# Patient Record
Sex: FEMALE | Race: WHITE | Employment: PART TIME | URBAN - METROPOLITAN AREA
[De-identification: names, ages, dates, MRNs, and addresses within clinical notes are randomized per-mention and may not be internally consistent; named-entity substitution may affect disease eponyms.]

---

## 2017-01-30 ENCOUNTER — OFFICE VISIT (OUTPATIENT)
Dept: INTERNAL MEDICINE CLINIC | Age: 67
End: 2017-01-30

## 2017-01-30 VITALS
HEIGHT: 67 IN | OXYGEN SATURATION: 97 % | SYSTOLIC BLOOD PRESSURE: 132 MMHG | HEART RATE: 51 BPM | WEIGHT: 188 LBS | DIASTOLIC BLOOD PRESSURE: 80 MMHG | BODY MASS INDEX: 29.51 KG/M2

## 2017-01-30 DIAGNOSIS — R35.0 FREQUENT URINATION: Primary | ICD-10-CM

## 2017-01-30 LAB
BILIRUBIN, POC: NORMAL
BLOOD URINE, POC: NORMAL
CLARITY, POC: NORMAL
COLOR, POC: NORMAL
GLUCOSE URINE, POC: NORMAL
KETONES, POC: NORMAL
LEUKOCYTE EST, POC: NORMAL
NITRITE, POC: NORMAL
PH, POC: 6
PROTEIN, POC: NORMAL
SPECIFIC GRAVITY, POC: 1.01
UROBILINOGEN, POC: 0.2

## 2017-01-30 PROCEDURE — 81002 URINALYSIS NONAUTO W/O SCOPE: CPT | Performed by: INTERNAL MEDICINE

## 2017-01-30 PROCEDURE — 99213 OFFICE O/P EST LOW 20 MIN: CPT | Performed by: INTERNAL MEDICINE

## 2017-01-30 RX ORDER — SULFAMETHOXAZOLE AND TRIMETHOPRIM 800; 160 MG/1; MG/1
1 TABLET ORAL 2 TIMES DAILY
Qty: 10 TABLET | Refills: 0 | Status: SHIPPED | OUTPATIENT
Start: 2017-01-30 | End: 2017-02-04

## 2017-01-30 ASSESSMENT — ENCOUNTER SYMPTOMS: COUGH: 0

## 2017-02-02 LAB
ORGANISM: ABNORMAL
URINE CULTURE, ROUTINE: ABNORMAL

## 2017-02-16 ENCOUNTER — OFFICE VISIT (OUTPATIENT)
Dept: INTERNAL MEDICINE CLINIC | Age: 67
End: 2017-02-16

## 2017-02-16 VITALS
OXYGEN SATURATION: 96 % | DIASTOLIC BLOOD PRESSURE: 60 MMHG | HEIGHT: 67 IN | SYSTOLIC BLOOD PRESSURE: 132 MMHG | WEIGHT: 189.6 LBS | TEMPERATURE: 98 F | HEART RATE: 49 BPM | BODY MASS INDEX: 29.76 KG/M2

## 2017-02-16 DIAGNOSIS — R21 RASH: ICD-10-CM

## 2017-02-16 DIAGNOSIS — I10 ESSENTIAL HYPERTENSION: ICD-10-CM

## 2017-02-16 DIAGNOSIS — K21.9 GASTROESOPHAGEAL REFLUX DISEASE WITHOUT ESOPHAGITIS: ICD-10-CM

## 2017-02-16 DIAGNOSIS — E78.00 PURE HYPERCHOLESTEROLEMIA: ICD-10-CM

## 2017-02-16 DIAGNOSIS — Z23 NEED FOR INFLUENZA VACCINATION: Primary | ICD-10-CM

## 2017-02-16 PROCEDURE — 90662 IIV NO PRSV INCREASED AG IM: CPT | Performed by: INTERNAL MEDICINE

## 2017-02-16 PROCEDURE — 99214 OFFICE O/P EST MOD 30 MIN: CPT | Performed by: INTERNAL MEDICINE

## 2017-02-16 PROCEDURE — G0008 ADMIN INFLUENZA VIRUS VAC: HCPCS | Performed by: INTERNAL MEDICINE

## 2017-02-16 RX ORDER — CLOTRIMAZOLE AND BETAMETHASONE DIPROPIONATE 10; .64 MG/G; MG/G
CREAM TOPICAL
Qty: 45 G | Refills: 1 | Status: SHIPPED | OUTPATIENT
Start: 2017-02-16 | End: 2017-08-17

## 2017-02-16 RX ORDER — LISINOPRIL AND HYDROCHLOROTHIAZIDE 20; 12.5 MG/1; MG/1
TABLET ORAL
Qty: 90 TABLET | Refills: 1 | Status: SHIPPED | OUTPATIENT
Start: 2017-02-16 | End: 2017-08-17 | Stop reason: SDUPTHER

## 2017-02-16 RX ORDER — FAMOTIDINE 20 MG/1
20 TABLET, FILM COATED ORAL DAILY
Qty: 90 TABLET | Refills: 1 | Status: SHIPPED | OUTPATIENT
Start: 2017-02-16 | End: 2017-08-17 | Stop reason: SDUPTHER

## 2017-02-16 RX ORDER — LOVASTATIN 40 MG/1
40 TABLET ORAL DAILY
Qty: 90 TABLET | Refills: 1 | Status: SHIPPED | OUTPATIENT
Start: 2017-02-16 | End: 2017-08-17 | Stop reason: SDUPTHER

## 2017-02-16 RX ORDER — ATENOLOL 50 MG/1
TABLET ORAL
Qty: 90 TABLET | Refills: 1 | Status: SHIPPED | OUTPATIENT
Start: 2017-02-16 | End: 2017-08-17 | Stop reason: SDUPTHER

## 2017-02-16 ASSESSMENT — ENCOUNTER SYMPTOMS
SORE THROAT: 0
COUGH: 0

## 2017-07-07 DIAGNOSIS — I10 ESSENTIAL HYPERTENSION: ICD-10-CM

## 2017-07-07 DIAGNOSIS — E78.00 PURE HYPERCHOLESTEROLEMIA: ICD-10-CM

## 2017-07-07 LAB
A/G RATIO: 1.9 (ref 1.1–2.2)
ALBUMIN SERPL-MCNC: 4.2 G/DL (ref 3.4–5)
ALP BLD-CCNC: 63 U/L (ref 40–129)
ALT SERPL-CCNC: 15 U/L (ref 10–40)
ANION GAP SERPL CALCULATED.3IONS-SCNC: 13 MMOL/L (ref 3–16)
AST SERPL-CCNC: 13 U/L (ref 15–37)
BILIRUB SERPL-MCNC: 0.5 MG/DL (ref 0–1)
BUN BLDV-MCNC: 16 MG/DL (ref 7–20)
CALCIUM SERPL-MCNC: 9.4 MG/DL (ref 8.3–10.6)
CHLORIDE BLD-SCNC: 103 MMOL/L (ref 99–110)
CHOLESTEROL, TOTAL: 174 MG/DL (ref 0–199)
CO2: 27 MMOL/L (ref 21–32)
CREAT SERPL-MCNC: 0.7 MG/DL (ref 0.6–1.2)
GFR AFRICAN AMERICAN: >60
GFR NON-AFRICAN AMERICAN: >60
GLOBULIN: 2.2 G/DL
GLUCOSE BLD-MCNC: 101 MG/DL (ref 70–99)
HDLC SERPL-MCNC: 43 MG/DL (ref 40–60)
LDL CHOLESTEROL CALCULATED: 92 MG/DL
POTASSIUM SERPL-SCNC: 4.9 MMOL/L (ref 3.5–5.1)
SODIUM BLD-SCNC: 143 MMOL/L (ref 136–145)
TOTAL PROTEIN: 6.4 G/DL (ref 6.4–8.2)
TRIGL SERPL-MCNC: 197 MG/DL (ref 0–150)
VLDLC SERPL CALC-MCNC: 39 MG/DL

## 2017-07-20 ENCOUNTER — TELEPHONE (OUTPATIENT)
Dept: FAMILY MEDICINE CLINIC | Age: 67
End: 2017-07-20

## 2017-08-17 ENCOUNTER — OFFICE VISIT (OUTPATIENT)
Dept: FAMILY MEDICINE CLINIC | Age: 67
End: 2017-08-17

## 2017-08-17 VITALS
WEIGHT: 188.2 LBS | SYSTOLIC BLOOD PRESSURE: 112 MMHG | DIASTOLIC BLOOD PRESSURE: 70 MMHG | BODY MASS INDEX: 29.54 KG/M2 | HEIGHT: 67 IN | HEART RATE: 57 BPM | OXYGEN SATURATION: 98 %

## 2017-08-17 DIAGNOSIS — K21.9 GASTROESOPHAGEAL REFLUX DISEASE WITHOUT ESOPHAGITIS: ICD-10-CM

## 2017-08-17 DIAGNOSIS — Z23 NEED FOR VACCINATION WITH 13-POLYVALENT PNEUMOCOCCAL CONJUGATE VACCINE: ICD-10-CM

## 2017-08-17 DIAGNOSIS — I10 ESSENTIAL HYPERTENSION: Primary | ICD-10-CM

## 2017-08-17 DIAGNOSIS — E78.00 PURE HYPERCHOLESTEROLEMIA: ICD-10-CM

## 2017-08-17 DIAGNOSIS — Z12.31 ENCOUNTER FOR SCREENING MAMMOGRAM FOR BREAST CANCER: ICD-10-CM

## 2017-08-17 PROCEDURE — 90670 PCV13 VACCINE IM: CPT | Performed by: INTERNAL MEDICINE

## 2017-08-17 PROCEDURE — G0009 ADMIN PNEUMOCOCCAL VACCINE: HCPCS | Performed by: INTERNAL MEDICINE

## 2017-08-17 PROCEDURE — 99214 OFFICE O/P EST MOD 30 MIN: CPT | Performed by: INTERNAL MEDICINE

## 2017-08-17 RX ORDER — LISINOPRIL AND HYDROCHLOROTHIAZIDE 20; 12.5 MG/1; MG/1
TABLET ORAL
Qty: 90 TABLET | Refills: 1 | Status: SHIPPED | OUTPATIENT
Start: 2017-08-17 | End: 2018-02-13 | Stop reason: SDUPTHER

## 2017-08-17 RX ORDER — FAMOTIDINE 20 MG/1
20 TABLET, FILM COATED ORAL DAILY
Qty: 90 TABLET | Refills: 1 | Status: SHIPPED | OUTPATIENT
Start: 2017-08-17 | End: 2018-02-28 | Stop reason: SDUPTHER

## 2017-08-17 RX ORDER — ATENOLOL 50 MG/1
TABLET ORAL
Qty: 90 TABLET | Refills: 1 | Status: SHIPPED | OUTPATIENT
Start: 2017-08-17 | End: 2018-02-13 | Stop reason: SDUPTHER

## 2017-08-17 RX ORDER — LOVASTATIN 40 MG/1
40 TABLET ORAL DAILY
Qty: 90 TABLET | Refills: 1 | Status: SHIPPED | OUTPATIENT
Start: 2017-08-17 | End: 2018-08-07 | Stop reason: SDUPTHER

## 2017-08-20 ASSESSMENT — ENCOUNTER SYMPTOMS
SORE THROAT: 0
COUGH: 0

## 2017-11-27 ASSESSMENT — ENCOUNTER SYMPTOMS
SORE THROAT: 0
COUGH: 0

## 2017-11-28 ENCOUNTER — OFFICE VISIT (OUTPATIENT)
Dept: FAMILY MEDICINE CLINIC | Age: 67
End: 2017-11-28

## 2017-11-28 VITALS
HEART RATE: 53 BPM | SYSTOLIC BLOOD PRESSURE: 134 MMHG | BODY MASS INDEX: 29.51 KG/M2 | RESPIRATION RATE: 16 BRPM | OXYGEN SATURATION: 98 % | DIASTOLIC BLOOD PRESSURE: 78 MMHG | WEIGHT: 188 LBS | HEIGHT: 67 IN | TEMPERATURE: 98 F

## 2017-11-28 DIAGNOSIS — E78.00 PURE HYPERCHOLESTEROLEMIA: ICD-10-CM

## 2017-11-28 DIAGNOSIS — I10 ESSENTIAL HYPERTENSION: Primary | ICD-10-CM

## 2017-11-28 DIAGNOSIS — R73.01 IMPAIRED FASTING BLOOD SUGAR: ICD-10-CM

## 2017-11-28 DIAGNOSIS — K21.9 GASTROESOPHAGEAL REFLUX DISEASE WITHOUT ESOPHAGITIS: ICD-10-CM

## 2017-11-28 DIAGNOSIS — Z23 NEED FOR INFLUENZA VACCINATION: ICD-10-CM

## 2017-11-28 PROCEDURE — G0008 ADMIN INFLUENZA VIRUS VAC: HCPCS | Performed by: INTERNAL MEDICINE

## 2017-11-28 PROCEDURE — 99214 OFFICE O/P EST MOD 30 MIN: CPT | Performed by: INTERNAL MEDICINE

## 2017-11-28 PROCEDURE — 90662 IIV NO PRSV INCREASED AG IM: CPT | Performed by: INTERNAL MEDICINE

## 2017-11-28 ASSESSMENT — PATIENT HEALTH QUESTIONNAIRE - PHQ9
1. LITTLE INTEREST OR PLEASURE IN DOING THINGS: 0
2. FEELING DOWN, DEPRESSED OR HOPELESS: 0
SUM OF ALL RESPONSES TO PHQ9 QUESTIONS 1 & 2: 0
SUM OF ALL RESPONSES TO PHQ QUESTIONS 1-9: 0

## 2017-11-28 NOTE — PROGRESS NOTES
Subjective:      Patient ID: Abigail Etsrada is a 69 y.o. female. Came in for check up. On pepcid for gerd. Use lotrisone cream for inguinal rash. .         Hyperlipidemia   This is a chronic problem. Episode onset: since 2007. The problem is controlled. Recent lipid tests were reviewed and are normal. There are no known factors aggravating her hyperlipidemia. Pertinent negatives include no chest pain. Treatments tried: mevacor 20 mg daily. The current treatment provides significant improvement of lipids. There are no compliance problems. Risk factors for coronary artery disease include dyslipidemia, hypertension, post-menopausal and stress. Hypertension   This is a chronic problem. Episode onset: since 2007. The problem has been gradually improving since onset. The problem is controlled. Pertinent negatives include no chest pain, headaches, neck pain or peripheral edema. There are no associated agents to hypertension. Risk factors for coronary artery disease include dyslipidemia, family history, post-menopausal state and sedentary lifestyle. Past treatments include ACE inhibitors, diuretics and beta blockers. The current treatment provides significant improvement. There are no compliance problems. no end organ damage. no identifiable causes.      Lab Results   Component Value Date    CHOL 174 07/07/2017    CHOL 179 08/24/2016    CHOL 173 02/02/2016     Lab Results   Component Value Date    TRIG 197 (H) 07/07/2017    TRIG 171 (H) 08/24/2016    TRIG 162 (H) 02/02/2016     Lab Results   Component Value Date    HDL 43 07/07/2017    HDL 40 08/24/2016    HDL 41 02/02/2016     Lab Results   Component Value Date    LDLCALC 92 07/07/2017    LDLCALC 105 (H) 08/24/2016    LDLCALC 100 (H) 02/02/2016     Lab Results   Component Value Date    LABVLDL 39 07/07/2017    LABVLDL 34 08/24/2016    LABVLDL 32 02/02/2016       Chemistry        Component Value Date/Time     07/07/2017 1027    K 4.9 07/07/2017 1027  07/07/2017 1027    CO2 27 07/07/2017 1027    BUN 16 07/07/2017 1027    CREATININE 0.7 07/07/2017 1027        Component Value Date/Time    CALCIUM 9.4 07/07/2017 1027    ALKPHOS 63 07/07/2017 1027    AST 13 (L) 07/07/2017 1027    ALT 15 07/07/2017 1027    BILITOT 0.5 07/07/2017 1027       Blood sugar     101                               O7/07/2017    Review of Systems   Constitutional: Negative for activity change. HENT: Negative. Negative for sore throat. Respiratory: Negative for cough. Cardiovascular: Negative for chest pain. Gastrointestinal:        Taking pepcid OTC for gerd   Musculoskeletal: Negative for neck pain. Neurological: Negative for dizziness and headaches. Hematological: Does not bruise/bleed easily. Psychiatric/Behavioral: Negative. Patient Active Problem List   Diagnosis    Hypertension    Hyperlipidemia    Impaired fasting blood sugar       No outpatient prescriptions have been marked as taking for the 11/28/17 encounter (Appointment) with Madison Wisdom MD.       No Known Allergies    Social History   Substance Use Topics    Smoking status: Never Smoker    Smokeless tobacco: Not on file    Alcohol use No       Objective: There were no vitals taken for this visit. Physical Exam   Constitutional: She appears well-developed and well-nourished. HENT:   Head: Normocephalic. Eyes: Pupils are equal, round, and reactive to light. Neck: Normal range of motion. Neck supple. Cardiovascular: Normal rate and regular rhythm. Pulmonary/Chest: Effort normal and breath sounds normal.   Abdominal: Soft. Musculoskeletal: Normal range of motion. She exhibits no edema. Psychiatric: She has a normal mood and affect. Nursing note and vitals reviewed. Assessment:/plan:      1. Need for influenza vaccination    - INFLUENZA, HIGH DOSE, 65 YRS +, IM, PF, PREFILL SYR, 0.5ML (FLUZONE HD)    2. Essential hypertension  -controlled    3.  Pure hypercholesterolemia  -controlled    4. Impaired fasting blood sugar    -cutting back sweets and carbs    5. Josh Ram  [employer] received counseling on the following healthy behaviors: nutrition, exercise and medication adherence    Patient given educational materials on Hypertension    I have instructed Tanesha Mustafa to complete a self tracking handout on Blood Pressures  and instructed them to bring it with them to her next appointment. Discussed use, benefit, and side effects of prescribed medications. Barriers to medication compliance addressed. All patient questions answered. Pt voiced understanding.          Brooklyn Agudelo MD

## 2017-11-28 NOTE — PROGRESS NOTES
Vaccine Information Sheet, \"Influenza - Inactivated\"  given to Ary Urban, or parent/legal guardian of  Ary Urban and verbalized understanding. Patient responses:    Have you ever had a reaction to a flu vaccine? No  Are you able to eat eggs without adverse effects? Yes  Do you have any current illness? No  Have you ever had [place] Syndrome? No    Flu vaccine given per order. Please see immunization tab.

## 2017-11-28 NOTE — PATIENT INSTRUCTIONS
take decongestants or anti-inflammatory medicine, such as ibuprofen. Some of these medicines can raise blood pressure. · Learn how to check your blood pressure at home. Lifestyle changes  · Stay at a healthy weight. This is especially important if you put on weight around the waist. Losing even 10 pounds can help you lower your blood pressure. · If your doctor recommends it, get more exercise. Walking is a good choice. Bit by bit, increase the amount you walk every day. Try for at least 30 minutes on most days of the week. You also may want to swim, bike, or do other activities. · Avoid or limit alcohol. Talk to your doctor about whether you can drink any alcohol. · Try to limit how much sodium you eat to less than 2,300 milligrams (mg) a day. Your doctor may ask you to try to eat less than 1,500 mg a day. · Eat plenty of fruits (such as bananas and oranges), vegetables, legumes, whole grains, and low-fat dairy products. · Lower the amount of saturated fat in your diet. Saturated fat is found in animal products such as milk, cheese, and meat. Limiting these foods may help you lose weight and also lower your risk for heart disease. · Do not smoke. Smoking increases your risk for heart attack and stroke. If you need help quitting, talk to your doctor about stop-smoking programs and medicines. These can increase your chances of quitting for good. When should you call for help? Call 911 anytime you think you may need emergency care. This may mean having symptoms that suggest that your blood pressure is causing a serious heart or blood vessel problem. Your blood pressure may be over 180/110. For example, call 911 if:  · You have symptoms of a heart attack. These may include:  ¨ Chest pain or pressure, or a strange feeling in the chest.  ¨ Sweating. ¨ Shortness of breath. ¨ Nausea or vomiting.   ¨ Pain, pressure, or a strange feeling in the back, neck, jaw, or upper belly or in one or both shoulders or

## 2017-12-03 PROBLEM — K21.9 GASTROESOPHAGEAL REFLUX DISEASE WITHOUT ESOPHAGITIS: Status: ACTIVE | Noted: 2017-12-03

## 2018-01-25 ENCOUNTER — TELEPHONE (OUTPATIENT)
Dept: FAMILY MEDICINE CLINIC | Age: 68
End: 2018-01-25

## 2018-01-25 DIAGNOSIS — Z12.11 SCREEN FOR COLON CANCER: Primary | ICD-10-CM

## 2018-01-25 LAB
CONTROL: ABNORMAL
HEMOCCULT STL QL: ABNORMAL

## 2018-01-25 PROCEDURE — 82274 ASSAY TEST FOR BLOOD FECAL: CPT | Performed by: INTERNAL MEDICINE

## 2018-02-01 ENCOUNTER — OFFICE VISIT (OUTPATIENT)
Dept: FAMILY MEDICINE CLINIC | Age: 68
End: 2018-02-01

## 2018-02-01 VITALS
SYSTOLIC BLOOD PRESSURE: 118 MMHG | OXYGEN SATURATION: 98 % | HEART RATE: 65 BPM | DIASTOLIC BLOOD PRESSURE: 70 MMHG | WEIGHT: 187 LBS | HEIGHT: 67 IN | BODY MASS INDEX: 29.35 KG/M2

## 2018-02-01 DIAGNOSIS — R19.5 POSITIVE FIT (FECAL IMMUNOCHEMICAL TEST): ICD-10-CM

## 2018-02-01 DIAGNOSIS — J01.00 ACUTE MAXILLARY SINUSITIS, RECURRENCE NOT SPECIFIED: Primary | ICD-10-CM

## 2018-02-01 PROCEDURE — 99213 OFFICE O/P EST LOW 20 MIN: CPT | Performed by: INTERNAL MEDICINE

## 2018-02-01 RX ORDER — AMOXICILLIN AND CLAVULANATE POTASSIUM 875; 125 MG/1; MG/1
1 TABLET, FILM COATED ORAL 2 TIMES DAILY WITH MEALS
Qty: 20 TABLET | Refills: 0 | Status: SHIPPED | OUTPATIENT
Start: 2018-02-01 | End: 2018-02-11

## 2018-02-01 ASSESSMENT — ENCOUNTER SYMPTOMS
COUGH: 1
SINUS PRESSURE: 1

## 2018-02-01 NOTE — PATIENT INSTRUCTIONS
your test results and keep a list of the medicines you take. How can you care for yourself at home? · Take an over-the-counter pain medicine, such as acetaminophen (Tylenol), ibuprofen (Advil, Motrin), or naproxen (Aleve). Read and follow all instructions on the label. · If the doctor prescribed antibiotics, take them as directed. Do not stop taking them just because you feel better. You need to take the full course of antibiotics. · Be careful when taking over-the-counter cold or flu medicines and Tylenol at the same time. Many of these medicines have acetaminophen, which is Tylenol. Read the labels to make sure that you are not taking more than the recommended dose. Too much acetaminophen (Tylenol) can be harmful. · Breathe warm, moist air from a steamy shower, a hot bath, or a sink filled with hot water. Avoid cold, dry air. Using a humidifier in your home may help. Follow the directions for cleaning the machine. · Use saline (saltwater) nasal washes to help keep your nasal passages open and wash out mucus and bacteria. You can buy saline nose drops at a grocery store or drugstore. Or you can make your own at home by adding 1 teaspoon of salt and 1 teaspoon of baking soda to 2 cups of distilled water. If you make your own, fill a bulb syringe with the solution, insert the tip into your nostril, and squeeze gently. [employer] your nose. · Put a hot, wet towel or a warm gel pack on your face 3 or 4 times a day for 5 to 10 minutes each time. · Try a decongestant nasal spray like oxymetazoline (Afrin). Do not use it for more than 3 days in a row. Using it for more than 3 days can make your congestion worse. When should you call for help? Call your doctor now or seek immediate medical care if:  ? · You have new or worse swelling or redness in your face or around your eyes. ? · You have a new or higher fever. ? Watch closely for changes in your health, and be sure to contact your doctor if:  ? · You have new or which is Tylenol. Read the labels to make sure that you are not taking more than the recommended dose. Too much acetaminophen (Tylenol) can be harmful. · Breathe warm, moist air from a steamy shower, a hot bath, or a sink filled with hot water. Avoid cold, dry air. Using a humidifier in your home may help. Follow the directions for cleaning the machine. · Use saline (saltwater) nasal washes to help keep your nasal passages open and wash out mucus and bacteria. You can buy saline nose drops at a grocery store or [employer]. Or you can make your own at home by adding 1 teaspoon of salt and 1 teaspoon of baking soda to 2 cups of distilled water. If you make your own, fill a bulb syringe with the solution, insert the tip into your nostril, and squeeze gently. [employer] your nose. · Put a hot, wet towel or a warm gel pack on your face 3 or 4 times a day for 5 to 10 minutes each time. · Try a decongestant nasal spray like oxymetazoline (Afrin). Do not use it for more than 3 days in a row. Using it for more than 3 days can make your congestion worse. When should you call for help? Call your doctor now or seek immediate medical care if:  ? · You have new or worse swelling or redness in your face or around your eyes. ? · You have a new or higher fever. ? Watch closely for changes in your health, and be sure to contact your doctor if:  ? · You have new or worse facial pain. ? · The mucus from your nose becomes thicker (like pus) or has new blood in it. ? · You are not getting better as expected. Where can you learn more? Go to https://CatbirdpeGood World Gameseb.[employer]. org and sign in to your [employer] account. Enter G192 in the Barnebys box to learn more about \"Sinusitis: Care Instructions. \"     If you do not have an account, please click on the \"Sign Up Now\" link. Current as of: May 12, 2017  Content Version: 11.5  © 5430-8188 Healthwise, [employer].  Care instructions adapted under license by [employer]

## 2018-02-02 PROBLEM — R19.5 POSITIVE FIT (FECAL IMMUNOCHEMICAL TEST): Status: ACTIVE | Noted: 2018-02-02

## 2018-02-02 ASSESSMENT — ENCOUNTER SYMPTOMS: SINUS PAIN: 1

## 2018-02-13 DIAGNOSIS — I10 ESSENTIAL HYPERTENSION: ICD-10-CM

## 2018-02-13 DIAGNOSIS — E78.00 PURE HYPERCHOLESTEROLEMIA: ICD-10-CM

## 2018-02-14 RX ORDER — LOVASTATIN 40 MG/1
TABLET ORAL
Qty: 90 TABLET | Refills: 1 | Status: SHIPPED | OUTPATIENT
Start: 2018-02-14 | End: 2018-03-26 | Stop reason: SDUPTHER

## 2018-02-14 RX ORDER — ATENOLOL 50 MG/1
TABLET ORAL
Qty: 90 TABLET | Refills: 1 | Status: SHIPPED | OUTPATIENT
Start: 2018-02-14 | End: 2018-08-07 | Stop reason: SDUPTHER

## 2018-02-14 RX ORDER — LISINOPRIL AND HYDROCHLOROTHIAZIDE 20; 12.5 MG/1; MG/1
TABLET ORAL
Qty: 90 TABLET | Refills: 1 | Status: SHIPPED | OUTPATIENT
Start: 2018-02-14 | End: 2018-08-07 | Stop reason: SDUPTHER

## 2018-02-28 DIAGNOSIS — K21.9 GASTROESOPHAGEAL REFLUX DISEASE WITHOUT ESOPHAGITIS: ICD-10-CM

## 2018-03-01 RX ORDER — FAMOTIDINE 20 MG/1
20 TABLET, FILM COATED ORAL DAILY
Qty: 90 TABLET | Refills: 1 | Status: SHIPPED | OUTPATIENT
Start: 2018-03-01 | End: 2018-08-07 | Stop reason: SDUPTHER

## 2018-03-26 ENCOUNTER — OFFICE VISIT (OUTPATIENT)
Dept: FAMILY MEDICINE CLINIC | Age: 68
End: 2018-03-26

## 2018-03-26 VITALS
DIASTOLIC BLOOD PRESSURE: 70 MMHG | OXYGEN SATURATION: 95 % | HEIGHT: 67 IN | SYSTOLIC BLOOD PRESSURE: 118 MMHG | WEIGHT: 188 LBS | HEART RATE: 52 BPM | BODY MASS INDEX: 29.51 KG/M2

## 2018-03-26 DIAGNOSIS — E78.00 PURE HYPERCHOLESTEROLEMIA: ICD-10-CM

## 2018-03-26 DIAGNOSIS — J30.9 ALLERGIC RHINITIS, UNSPECIFIED CHRONICITY, UNSPECIFIED SEASONALITY, UNSPECIFIED TRIGGER: ICD-10-CM

## 2018-03-26 DIAGNOSIS — Z00.00 ROUTINE GENERAL MEDICAL EXAMINATION AT A HEALTH CARE FACILITY: Primary | ICD-10-CM

## 2018-03-26 DIAGNOSIS — I10 ESSENTIAL HYPERTENSION: ICD-10-CM

## 2018-03-26 DIAGNOSIS — R73.01 IMPAIRED FASTING BLOOD SUGAR: ICD-10-CM

## 2018-03-26 DIAGNOSIS — K21.9 GASTROESOPHAGEAL REFLUX DISEASE WITHOUT ESOPHAGITIS: ICD-10-CM

## 2018-03-26 DIAGNOSIS — R21 SKIN RASH: ICD-10-CM

## 2018-03-26 DIAGNOSIS — Z12.31 ENCOUNTER FOR SCREENING MAMMOGRAM FOR BREAST CANCER: ICD-10-CM

## 2018-03-26 DIAGNOSIS — Z12.11 SCREENING FOR COLON CANCER: ICD-10-CM

## 2018-03-26 PROCEDURE — G0439 PPPS, SUBSEQ VISIT: HCPCS | Performed by: INTERNAL MEDICINE

## 2018-03-26 RX ORDER — CLOTRIMAZOLE AND BETAMETHASONE DIPROPIONATE 10; .64 MG/G; MG/G
CREAM TOPICAL
Qty: 45 G | Refills: 1 | Status: SHIPPED | OUTPATIENT
Start: 2018-03-26 | End: 2018-03-27

## 2018-03-26 ASSESSMENT — LIFESTYLE VARIABLES: HOW OFTEN DO YOU HAVE A DRINK CONTAINING ALCOHOL: 0

## 2018-03-26 ASSESSMENT — PATIENT HEALTH QUESTIONNAIRE - PHQ9: SUM OF ALL RESPONSES TO PHQ QUESTIONS 1-9: 0

## 2018-03-26 ASSESSMENT — ANXIETY QUESTIONNAIRES: GAD7 TOTAL SCORE: 0

## 2018-03-26 NOTE — PATIENT INSTRUCTIONS
Learning About Durable Power of  for Health Care  What is a durable power of  for health care? A durable power of  for health care is one type of the legal forms called advance directives. It lets you decide who you want to make treatment decisions for you if you cannot speak or decide for yourself. The person you choose is called your health care agent. Another type of advance directive is a living will. It lets you write down what kinds of treatment or life support you want or do not want. What should you think about when choosing a health care agent? Choose your health care agent carefully. This person may or may not be a family member. Talk to the person before you make your final decision. Make sure he or she is comfortable with this responsibility. It's a good idea to choose someone who:  · Is at least 25years old. · Knows you well and understands what makes life meaningful for you. · Understands your Mu-ism and moral values. · Will do what you want, not what he or she wants. · Will be able to make difficult choices at a stressful time. · Will be able to refuse or stop treatment, if that is what you would want, even if you could die. · Will be firm and confident with health professionals if needed. · Will ask questions to get necessary information. · Lives near you or agrees to travel to you if needed. Your family may help you make medical decisions while you can still be part of that process. But it is important to choose one person to be your health care agent in case you are not able to make decisions for yourself. If you don't fill out the legal form and name a health care agent, the decisions your family can make may be limited. Who will make decisions for you if you do not have a health care agent? If you don't have a health care agent or a living will, your family members may disagree about your medical care.  And then some medical professionals who may not know you as well might have to make decisions for you. In some cases, a  makes the decisions. When you name a health care agent, it is very clear who has the power to make health decisions for you. How do you name a health care agent? You name your health care agent on a legal form. It is usually called a durable power of  for health care. Ask your hospital, state bar association, or office on aging where to find these forms. You must sign the form to make it legal. Some states require you to get the form notarized. This means that a person called a  watches you sign the form and then he or she signs the form. Some states also require that two or more witnesses sign the form. Be sure to tell your family members and doctors who your health care agent is. Keep your forms in a safe place. But make sure that your loved ones know where the forms are. This could be in your desk where you keep other important papers. Make sure your doctor has a copy of your forms. Where can you learn more? Go to https://Dreamweaver Internationalpepiceweb.Livestar. org and sign in to your [employer] account. Enter [id number] in the RecruitTalk box to learn more about \"Learning About Durable Power of  for Health Care. \"     If you do not have an account, please click on the \"Sign Up Now\" link. Current as of: September 24, 2016  Content Version: 11.5  © 3791-7439 Healthwise, [employer]. Care instructions adapted under license by [facility] ([facility]). If you have questions about a medical condition or this instruction, always ask your healthcare professional. [address] any warranty or liability for your use of this information. Personalized Preventive Plan for Luz Gaffney - 3/26/2018  Medicare offers a range of preventive health benefits. Some of the tests and screenings are paid in full while other may be subject to a deductible, co-insurance, and/or copay.     Some of these benefits inside your home, and keep trash and food containers sealed. Seal off any spots where cockroaches might enter your home. If you are allergic to mold, get rid of furniture, rugs, and drapes that smell musty. Check for mold in the bathroom. If you are allergic to outdoor pollen or mold spores, use air-conditioning. Change or clean all filters every month. Keep windows closed. If you are allergic to pollen, stay inside when pollen counts are high. Use a vacuum  with a HEPA filter or a double-thickness filter at least two times each week. Stay inside when air pollution is bad. Avoid paint fumes, perfumes, and other strong odors. Avoid conditions that make your allergies worse. Stay away from smoke. Do not smoke or let anyone else smoke in your house. Do not use fireplaces or wood-burning stoves. If you are allergic to your pets, change the air filter in your furnace every month. Use high-efficiency filters. If you are allergic to pet dander, keep pets outside or out of your bedroom. Old carpet and cloth furniture can hold a lot of animal dander. You may need to replace them. When should you call for help? Give an epinephrine shot if:  ? You think you are having a severe allergic reaction. ? You have symptoms in more than one body area, such as mild nausea and an itchy mouth. ? After giving an epinephrine shot call 911, even if you feel better. ?Call 911 if:  ? You have symptoms of a severe allergic reaction. These may include:  Sudden raised, red areas (hives) all over your body. Swelling of the throat, mouth, lips, or tongue. Trouble breathing. Passing out (losing consciousness). Or you may feel very lightheaded or suddenly feel weak, confused, or restless. ? You have been given an epinephrine shot, even if you feel better.    ?Call your doctor now or seek immediate medical care if:  ? You have symptoms of an allergic reaction, such as:  A rash or hives (raised, red areas on the skin). Itching. Swelling. Belly pain, nausea, or vomiting. ? Watch closely for changes in your health, and be sure to contact your doctor if:  ? You do not get better as expected. Where can you learn more? Go to https://chpegraceeb.Health Market Science. org and sign in to your Tek[employer]ch account. Enter Z871 in the The Game Creators box to learn more about \"Allergies: Care Instructions. \"     If you do not have an account, please click on the \"Sign Up Now\" link. Current as of: September 29, 2016  Content Version: 11.5  © 9419-5745 Healthwise, Incorporated. Care instructions adapted under license by [facility] ([facility]). If you have questions about a medical condition or this instruction, always ask your healthcare professional. [address] any warranty or liability for your use of this information.

## 2018-03-27 ENCOUNTER — TELEPHONE (OUTPATIENT)
Dept: FAMILY MEDICINE CLINIC | Age: 68
End: 2018-03-27

## 2018-03-27 RX ORDER — CLOTRIMAZOLE AND BETAMETHASONE DIPROPIONATE 10; .64 MG/G; MG/G
CREAM TOPICAL
Qty: 45 G | Refills: 1 | Status: SHIPPED | OUTPATIENT
Start: 2018-03-27 | End: 2018-08-07 | Stop reason: SDUPTHER

## 2018-03-30 DIAGNOSIS — Z12.11 SCREENING FOR COLON CANCER: Primary | ICD-10-CM

## 2018-08-07 ENCOUNTER — OFFICE VISIT (OUTPATIENT)
Dept: FAMILY MEDICINE CLINIC | Age: 68
End: 2018-08-07

## 2018-08-07 VITALS
SYSTOLIC BLOOD PRESSURE: 118 MMHG | DIASTOLIC BLOOD PRESSURE: 62 MMHG | BODY MASS INDEX: 29.82 KG/M2 | HEART RATE: 54 BPM | WEIGHT: 190 LBS | HEIGHT: 67 IN | OXYGEN SATURATION: 97 %

## 2018-08-07 DIAGNOSIS — R21 SKIN RASH: ICD-10-CM

## 2018-08-07 DIAGNOSIS — Z12.11 SCREENING FOR COLON CANCER: ICD-10-CM

## 2018-08-07 DIAGNOSIS — Z12.31 ENCOUNTER FOR SCREENING MAMMOGRAM FOR BREAST CANCER: ICD-10-CM

## 2018-08-07 DIAGNOSIS — I10 ESSENTIAL HYPERTENSION: Primary | ICD-10-CM

## 2018-08-07 DIAGNOSIS — K21.9 GASTROESOPHAGEAL REFLUX DISEASE WITHOUT ESOPHAGITIS: ICD-10-CM

## 2018-08-07 DIAGNOSIS — E78.00 PURE HYPERCHOLESTEROLEMIA: ICD-10-CM

## 2018-08-07 DIAGNOSIS — R73.01 IMPAIRED FASTING BLOOD SUGAR: ICD-10-CM

## 2018-08-07 PROCEDURE — 99214 OFFICE O/P EST MOD 30 MIN: CPT | Performed by: INTERNAL MEDICINE

## 2018-08-07 RX ORDER — ATENOLOL 50 MG/1
TABLET ORAL
Qty: 90 TABLET | Refills: 1 | Status: SHIPPED | OUTPATIENT
Start: 2018-08-07 | End: 2019-02-05 | Stop reason: SDUPTHER

## 2018-08-07 RX ORDER — LISINOPRIL AND HYDROCHLOROTHIAZIDE 20; 12.5 MG/1; MG/1
TABLET ORAL
Qty: 90 TABLET | Refills: 1 | Status: SHIPPED | OUTPATIENT
Start: 2018-08-07 | End: 2019-02-05 | Stop reason: SDUPTHER

## 2018-08-07 RX ORDER — CLOTRIMAZOLE AND BETAMETHASONE DIPROPIONATE 10; .64 MG/G; MG/G
CREAM TOPICAL
Qty: 45 G | Refills: 1 | Status: SHIPPED | OUTPATIENT
Start: 2018-08-07 | End: 2019-05-07 | Stop reason: SDUPTHER

## 2018-08-07 RX ORDER — FAMOTIDINE 20 MG/1
20 TABLET, FILM COATED ORAL DAILY
Qty: 90 TABLET | Refills: 1 | Status: SHIPPED | OUTPATIENT
Start: 2018-08-07 | End: 2018-08-13 | Stop reason: SDUPTHER

## 2018-08-07 RX ORDER — LOVASTATIN 40 MG/1
40 TABLET ORAL DAILY
Qty: 90 TABLET | Refills: 1 | Status: SHIPPED | OUTPATIENT
Start: 2018-08-07 | End: 2019-04-02 | Stop reason: SDUPTHER

## 2018-08-07 ASSESSMENT — ENCOUNTER SYMPTOMS
COUGH: 0
SORE THROAT: 0

## 2018-08-07 NOTE — PROGRESS NOTES
Subjective:     CC-hypertension    Patient ID: Janice Guillen is a 69 y.o. female. Came in for check up. On pepcid for gerd. Use lotrisone cream for inguinal rash. Watching her carbs and sweets  For impaired fasting blood sugar. Hyperlipidemia   This is a chronic problem. Episode onset: since 2007. The problem is controlled. Recent lipid tests were reviewed and are normal. There are no known factors aggravating her hyperlipidemia. Pertinent negatives include no chest pain. Treatments tried: mevacor 20 mg daily. The current treatment provides significant improvement of lipids. There are no compliance problems. Risk factors for coronary artery disease include dyslipidemia, hypertension, post-menopausal and stress. Hypertension   This is a chronic problem. Episode onset: since 2007. The problem has been gradually improving since onset. The problem is controlled. Pertinent negatives include no chest pain, headaches, neck pain or peripheral edema. There are no associated agents to hypertension. Risk factors for coronary artery disease include dyslipidemia, family history, post-menopausal state and sedentary lifestyle. Past treatments include ACE inhibitors, diuretics and beta blockers. The current treatment provides significant improvement. There are no compliance problems. no end organ damage. no identifiable causes.        Lab Results   Component Value Date    CHOL 174 07/07/2017    CHOL 179 08/24/2016    CHOL 173 02/02/2016     Lab Results   Component Value Date    TRIG 197 (H) 07/07/2017    TRIG 171 (H) 08/24/2016    TRIG 162 (H) 02/02/2016     Lab Results   Component Value Date    HDL 43 07/07/2017    HDL 40 08/24/2016    HDL 41 02/02/2016     Lab Results   Component Value Date    LDLCALC 92 07/07/2017    LDLCALC 105 (H) 08/24/2016    LDLCALC 100 (H) 02/02/2016     Lab Results   Component Value Date    LABVLDL 39 07/07/2017    LABVLDL 34 08/24/2016    LABVLDL 32 02/02/2016       Chemistry TAKE ONE TABLET BY MOUTH DAILY at night  For blood pressure  Dispense: 90 tablet; Refill: 1  - lisinopril-hydrochlorothiazide (PRINZIDE;ZESTORETIC) 20-12.5 MG per tablet; TAKE ONE TABLET BY MOUTH DAILYin AM for blood pressure  Dispense: 90 tablet; Refill: 1  - Comprehensive Metabolic Panel; Future    2. Pure hypercholesterolemia    - lovastatin (MEVACOR) 40 MG tablet; Take 1 tablet by mouth daily  Dispense: 90 tablet; Refill: 1  - Lipid Panel; Future    3. Impaired fasting blood sugar  -controlling her sweets and carbs    4. Skin rash    - clotrimazole-betamethasone (LOTRISONE) 1-0.05 % cream; Apply to affected area twice a day as needed  Dispense: 45 g; Refill: 1    5. Gastroesophageal reflux disease without esophagitis    - famotidine (PEPCID) 20 MG tablet; Take 1 tablet by mouth daily  Dispense: 90 tablet; Refill: 1    6. Encounter for screening mammogram for breast cancer    - Mammography Screening    7. Screening for colon cancer    - King Canales received counseling on the following healthy behaviors: nutrition, exercise and medication adherence    Patient given educational materials on Hypertension    Patient does not check her blood pressure at home. Discussed use, benefit, and side effects of prescribed medications. Barriers to medication compliance addressed. All patient questions answered. Pt voiced understanding.          Guillermina Swain MD

## 2018-08-13 ENCOUNTER — HOSPITAL ENCOUNTER (OUTPATIENT)
Dept: MAMMOGRAPHY | Age: 68
Discharge: HOME | End: 2018-08-13
Payer: COMMERCIAL

## 2018-08-13 DIAGNOSIS — Z12.31 ENCOUNTER FOR SCREENING MAMMOGRAM FOR BREAST CANCER: ICD-10-CM

## 2018-08-13 DIAGNOSIS — I10 ESSENTIAL HYPERTENSION: ICD-10-CM

## 2018-08-13 DIAGNOSIS — E78.00 PURE HYPERCHOLESTEROLEMIA: ICD-10-CM

## 2018-08-13 DIAGNOSIS — K21.9 GASTROESOPHAGEAL REFLUX DISEASE WITHOUT ESOPHAGITIS: ICD-10-CM

## 2018-08-13 LAB
A/G RATIO: 2.3 (ref 1.1–2.2)
ALBUMIN SERPL-MCNC: 4.5 G/DL (ref 3.4–5)
ALP BLD-CCNC: 72 U/L (ref 40–129)
ALT SERPL-CCNC: 16 U/L (ref 10–40)
ANION GAP SERPL CALCULATED.3IONS-SCNC: 14 MMOL/L (ref 3–16)
AST SERPL-CCNC: 14 U/L (ref 15–37)
BILIRUB SERPL-MCNC: 0.4 MG/DL (ref 0–1)
BUN BLDV-MCNC: 18 MG/DL (ref 7–20)
CALCIUM SERPL-MCNC: 9.4 MG/DL (ref 8.3–10.6)
CHLORIDE BLD-SCNC: 101 MMOL/L (ref 99–110)
CHOLESTEROL, TOTAL: 177 MG/DL (ref 0–199)
CO2: 27 MMOL/L (ref 21–32)
CREAT SERPL-MCNC: 0.7 MG/DL (ref 0.6–1.2)
GFR AFRICAN AMERICAN: >60
GFR NON-AFRICAN AMERICAN: >60
GLOBULIN: 2 G/DL
GLUCOSE BLD-MCNC: 91 MG/DL (ref 70–99)
HDLC SERPL-MCNC: 41 MG/DL (ref 40–60)
LDL CHOLESTEROL CALCULATED: 94 MG/DL
POTASSIUM SERPL-SCNC: 4 MMOL/L (ref 3.5–5.1)
SODIUM BLD-SCNC: 142 MMOL/L (ref 136–145)
TOTAL PROTEIN: 6.5 G/DL (ref 6.4–8.2)
TRIGL SERPL-MCNC: 212 MG/DL (ref 0–150)
VLDLC SERPL CALC-MCNC: 42 MG/DL

## 2018-08-13 PROCEDURE — 77067 SCR MAMMO BI INCL CAD: CPT

## 2018-08-13 RX ORDER — FAMOTIDINE 20 MG/1
20 TABLET, FILM COATED ORAL DAILY
Qty: 90 TABLET | Refills: 1 | Status: SHIPPED | OUTPATIENT
Start: 2018-08-13 | End: 2019-02-05 | Stop reason: SDUPTHER

## 2019-02-05 DIAGNOSIS — K21.9 GASTROESOPHAGEAL REFLUX DISEASE WITHOUT ESOPHAGITIS: ICD-10-CM

## 2019-02-05 DIAGNOSIS — I10 ESSENTIAL HYPERTENSION: ICD-10-CM

## 2019-02-05 RX ORDER — FAMOTIDINE 20 MG/1
20 TABLET, FILM COATED ORAL DAILY
Qty: 90 TABLET | Refills: 0 | Status: SHIPPED | OUTPATIENT
Start: 2019-02-05 | End: 2019-05-07 | Stop reason: SDUPTHER

## 2019-02-05 RX ORDER — LISINOPRIL AND HYDROCHLOROTHIAZIDE 20; 12.5 MG/1; MG/1
TABLET ORAL
Qty: 90 TABLET | Refills: 0 | Status: SHIPPED | OUTPATIENT
Start: 2019-02-05 | End: 2019-05-07 | Stop reason: SDUPTHER

## 2019-02-05 RX ORDER — ATENOLOL 50 MG/1
TABLET ORAL
Qty: 90 TABLET | Refills: 0 | Status: SHIPPED | OUTPATIENT
Start: 2019-02-05 | End: 2019-05-07 | Stop reason: SDUPTHER

## 2019-04-02 ENCOUNTER — TELEPHONE (OUTPATIENT)
Dept: FAMILY MEDICINE CLINIC | Age: 69
End: 2019-04-02

## 2019-04-02 DIAGNOSIS — E78.00 PURE HYPERCHOLESTEROLEMIA: ICD-10-CM

## 2019-04-02 RX ORDER — LOVASTATIN 40 MG/1
40 TABLET ORAL DAILY
Qty: 90 TABLET | Refills: 1 | Status: CANCELLED | OUTPATIENT
Start: 2019-04-02

## 2019-04-02 RX ORDER — LOVASTATIN 40 MG/1
40 TABLET ORAL DAILY
Qty: 30 TABLET | Refills: 0 | Status: SHIPPED | OUTPATIENT
Start: 2019-04-02 | End: 2019-05-07 | Stop reason: SDUPTHER

## 2019-05-07 ENCOUNTER — OFFICE VISIT (OUTPATIENT)
Dept: FAMILY MEDICINE CLINIC | Age: 69
End: 2019-05-07
Payer: COMMERCIAL

## 2019-05-07 VITALS
OXYGEN SATURATION: 97 % | WEIGHT: 191 LBS | DIASTOLIC BLOOD PRESSURE: 78 MMHG | BODY MASS INDEX: 29.98 KG/M2 | HEART RATE: 50 BPM | HEIGHT: 67 IN | SYSTOLIC BLOOD PRESSURE: 130 MMHG

## 2019-05-07 DIAGNOSIS — E78.00 PURE HYPERCHOLESTEROLEMIA: ICD-10-CM

## 2019-05-07 DIAGNOSIS — I10 ESSENTIAL HYPERTENSION: ICD-10-CM

## 2019-05-07 DIAGNOSIS — R21 SKIN RASH: ICD-10-CM

## 2019-05-07 DIAGNOSIS — K21.9 GASTROESOPHAGEAL REFLUX DISEASE WITHOUT ESOPHAGITIS: ICD-10-CM

## 2019-05-07 LAB
A/G RATIO: 1.7 (ref 1.1–2.2)
ALBUMIN SERPL-MCNC: 4.1 G/DL (ref 3.4–5)
ALP BLD-CCNC: 72 U/L (ref 40–129)
ALT SERPL-CCNC: 15 U/L (ref 10–40)
ANION GAP SERPL CALCULATED.3IONS-SCNC: 12 MMOL/L (ref 3–16)
AST SERPL-CCNC: 15 U/L (ref 15–37)
BILIRUB SERPL-MCNC: 0.4 MG/DL (ref 0–1)
BUN BLDV-MCNC: 20 MG/DL (ref 7–20)
CALCIUM SERPL-MCNC: 9.6 MG/DL (ref 8.3–10.6)
CHLORIDE BLD-SCNC: 104 MMOL/L (ref 99–110)
CHOLESTEROL, TOTAL: 169 MG/DL (ref 0–199)
CO2: 27 MMOL/L (ref 21–32)
CREAT SERPL-MCNC: 0.9 MG/DL (ref 0.6–1.2)
GFR AFRICAN AMERICAN: >60
GFR NON-AFRICAN AMERICAN: >60
GLOBULIN: 2.4 G/DL
GLUCOSE BLD-MCNC: 103 MG/DL (ref 70–99)
HCT VFR BLD CALC: 46.3 % (ref 36–48)
HDLC SERPL-MCNC: 38 MG/DL (ref 40–60)
HEMOGLOBIN: 15.3 G/DL (ref 12–16)
LDL CHOLESTEROL CALCULATED: 94 MG/DL
MCH RBC QN AUTO: 30.5 PG (ref 26–34)
MCHC RBC AUTO-ENTMCNC: 33.1 G/DL (ref 31–36)
MCV RBC AUTO: 92.1 FL (ref 80–100)
PDW BLD-RTO: 14.2 % (ref 12.4–15.4)
PLATELET # BLD: 242 K/UL (ref 135–450)
PMV BLD AUTO: 7.9 FL (ref 5–10.5)
POTASSIUM SERPL-SCNC: 4.3 MMOL/L (ref 3.5–5.1)
RBC # BLD: 5.03 M/UL (ref 4–5.2)
SODIUM BLD-SCNC: 143 MMOL/L (ref 136–145)
TOTAL PROTEIN: 6.5 G/DL (ref 6.4–8.2)
TRIGL SERPL-MCNC: 184 MG/DL (ref 0–150)
VLDLC SERPL CALC-MCNC: 37 MG/DL
WBC # BLD: 5.2 K/UL (ref 4–11)

## 2019-05-07 PROCEDURE — G8510 SCR DEP NEG, NO PLAN REQD: HCPCS | Performed by: NURSE PRACTITIONER

## 2019-05-07 PROCEDURE — 3288F FALL RISK ASSESSMENT DOCD: CPT | Performed by: NURSE PRACTITIONER

## 2019-05-07 PROCEDURE — 99214 OFFICE O/P EST MOD 30 MIN: CPT | Performed by: NURSE PRACTITIONER

## 2019-05-07 RX ORDER — CLOTRIMAZOLE AND BETAMETHASONE DIPROPIONATE 10; .64 MG/G; MG/G
CREAM TOPICAL
Qty: 45 G | Refills: 1 | Status: SHIPPED | OUTPATIENT
Start: 2019-05-07 | End: 2020-04-01 | Stop reason: SDUPTHER

## 2019-05-07 RX ORDER — FAMOTIDINE 20 MG/1
20 TABLET, FILM COATED ORAL DAILY
Qty: 90 TABLET | Refills: 0 | Status: SHIPPED | OUTPATIENT
Start: 2019-05-07 | End: 2019-07-31 | Stop reason: SDUPTHER

## 2019-05-07 RX ORDER — LOVASTATIN 40 MG/1
40 TABLET ORAL DAILY
Qty: 30 TABLET | Refills: 0 | Status: SHIPPED | OUTPATIENT
Start: 2019-05-07 | End: 2019-06-03 | Stop reason: SDUPTHER

## 2019-05-07 RX ORDER — ATENOLOL 50 MG/1
TABLET ORAL
Qty: 90 TABLET | Refills: 0 | Status: SHIPPED | OUTPATIENT
Start: 2019-05-07 | End: 2019-07-31 | Stop reason: SDUPTHER

## 2019-05-07 RX ORDER — LISINOPRIL AND HYDROCHLOROTHIAZIDE 20; 12.5 MG/1; MG/1
TABLET ORAL
Qty: 90 TABLET | Refills: 0 | Status: SHIPPED | OUTPATIENT
Start: 2019-05-07 | End: 2019-07-31 | Stop reason: SDUPTHER

## 2019-05-07 ASSESSMENT — PATIENT HEALTH QUESTIONNAIRE - PHQ9
2. FEELING DOWN, DEPRESSED OR HOPELESS: 1
1. LITTLE INTEREST OR PLEASURE IN DOING THINGS: 0
SUM OF ALL RESPONSES TO PHQ9 QUESTIONS 1 & 2: 1
SUM OF ALL RESPONSES TO PHQ QUESTIONS 1-9: 1
SUM OF ALL RESPONSES TO PHQ QUESTIONS 1-9: 1

## 2019-05-07 NOTE — PROGRESS NOTES
History and Physical      Crystal Orosco  YOB: 1950    Date of Service:  5/7/2019    Chief Complaint:   Crystal Orosco is a 77 y.o. female who presents for complete physical examination. HPI: HTN, hyperlipidemia, GERD; She reports she's been healthy this past winter. 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 She recently additionally was the primary caregiver for a sister that was non-weight bearing for 2 months following knee surgery. She monitors her blood pressure at home and has been running 130's/90's. She reports compliance with her hypertension medication regimen.         Wt Readings from Last 3 Encounters:   05/07/19 191 lb (86.6 kg)   08/07/18 190 lb (86.2 kg)   03/26/18 188 lb (85.3 kg)     BP Readings from Last 3 Encounters:   05/07/19 130/78   08/07/18 118/62   03/26/18 118/70       Patient Active Problem List   Diagnosis    Hypertension    Hyperlipidemia    Impaired fasting blood sugar    Gastroesophageal reflux disease without esophagitis    Positive FIT (fecal immunochemical test)       Preventive Care:  Health Maintenance   Topic Date Due    Hepatitis C screen  1950    A1C test (Diabetic or Prediabetic)  10/15/1958    Shingles Vaccine (1 of 2) 10/15/1998    Colon Cancer Screen FIT/FOBT  01/25/2019    DTaP/Tdap/Td vaccine (1 - Tdap) 01/20/2020 (Originally 10/15/1967)    Potassium monitoring  08/13/2019    Creatinine monitoring  08/13/2019    Flu vaccine (Season Ended) 09/01/2019    Breast cancer screen  08/13/2020    Lipid screen  08/13/2023    Pneumococcal 65+ years Vaccine  Completed    DEXA (modify frequency per FRAX score)  Addressed      Hx abnormal PAP: no  Sexual activity: none   Self-breast exams: yes  Previous DEXA scan: no  Last eye exam:, normal  Exercise: no regular exercise  Seatbelt use: yes    Lipid panel:   Lab Results   Component Value Date    CHOL 177 08/13/2018    TRIG 212 (H) 08/13/2018    HDL 41 08/13/2018 [address] In[address] Colin 94 08/13/2018        Advance Directive: N, Not Received    Immunization History   Administered Date(s) Administered    Influenza, High Dose (Fluzone 65 yrs and older) 02/16/2017, 11/28/2017    Pneumococcal 13-valent Conjugate ([id number]) 08/17/2017    Pneumococcal Polysaccharide ([id number]) 02/02/2016       No Known Allergies  Outpatient Medications Marked as Taking for the 5/7/19 encounter (Office Visit) with RADHA Walton CNP   Medication Sig Dispense Refill    lovastatin (MEVACOR) 40 MG tablet Take 1 tablet by mouth daily 30 tablet 0    atenolol (TENORMIN) 50 MG tablet TAKE ONE TABLET BY MOUTH DAILY at night  For blood pressure 90 tablet 0    famotidine (PEPCID) 20 MG tablet Take 1 tablet by mouth daily 90 tablet 0    lisinopril-hydrochlorothiazide (PRINZIDE;ZESTORETIC) 20-12.5 MG per tablet TAKE ONE TABLET BY MOUTH DAILYin AM for blood pressure 90 tablet 0    clotrimazole-betamethasone (LOTRISONE) 1-0.05 % cream Apply to affected area twice a day as needed 45 g 1    Cholecalciferol (VITAMIN D) 2000 UNITS CAPS capsule Take by mouth Indications: otc      aspirin 81 MG tablet Take 81 mg by mouth daily. OTC         Past Medical History:   Diagnosis Date    GERD (gastroesophageal reflux disease)     Hyperlipidemia     Hypertension      Past Surgical History:   Procedure Laterality Date    CHOLECYSTECTOMY  09/01    HYSTERECTOMY, TOTAL ABDOMINAL  1999    TONSILLECTOMY  1964    TUBAL LIGATION  1972     Family History   Problem Relation Age of Onset    Diabetes Mother     Heart Disease Mother     High Blood Pressure Mother     High Cholesterol Mother     Vision Loss Mother     Cancer Father      Social History     Socioeconomic History    Marital status:       Spouse name: Not on file    Number of children: Not on file    Years of education: Not on file    Highest education level: Not on file   Occupational History    Not on file   Social Needs    Financial resource strain: Not on file    Food insecurity:     Worry: Not on file     Inability: Not on file    Transportation needs:     Medical: Not on file     Non-medical: Not on file   Tobacco Use    Smoking status: Never Smoker    Smokeless tobacco: Never Used   Substance and Sexual Activity    Alcohol use: No    Drug use: No    Sexual activity: Yes     Partners: Male   Lifestyle    Physical activity:     Days per week: Not on file     Minutes per session: Not on file    Stress: Not on file   Relationships    Social connections:     Talks on phone: Not on file     Gets together: Not on file     Attends [employer] service: Not on file     Active member of club or organization: Not on file     Attends meetings of clubs or organizations: Not on file     Relationship status: Not on file    Intimate partner violence:     Fear of current or ex partner: Not on file     Emotionally abused: Not on file     Physically abused: Not on file     Forced sexual activity: Not on file   Other Topics Concern    Not on file   Social History Narrative    Not on file       Review of Systems:  A comprehensive review of systems was negative except for what was noted in the HPI. Physical Exam:   Vitals:    05/07/19 1105   BP: 130/78   Site: Left Upper Arm   Position: Sitting   Cuff Size: Medium Adult   Pulse: 50   SpO2: 97%   Weight: 191 lb (86.6 kg)   Height: 5' 7\" (1.702 m)     Body mass index is 29.91 kg/m². Constitutional: She is oriented to person, place, and time. She appears well-developed and well-nourished. No distress. HEENT:   Head: Normocephalic and atraumatic. Right Ear: Tympanic membrane, external ear and ear canal normal.   Left Ear: Tympanic membrane, external ear and ear canal normal.   Nose: Nose normal.   Mouth/Throat: Oropharynx is clear and moist, and mucous membranes are normal.  There is no cervical adenopathy.   Eyes: Conjunctivae and extraocular motions are normal. Pupils are equal, round, and reactive to light.   Neck: Neck supple. No JVD present. Carotid bruit is not present. No mass and no thyromegaly present. Cardiovascular: Normal rate, regular rhythm, normal heart sounds and intact distal pulses. Exam reveals no gallop and no friction rub. No murmur heard. Pulmonary/Chest: Effort normal and breath sounds normal. No respiratory distress. She has no wheezes, rhonchi or rales. Abdominal: Soft, non-tender. Bowel sounds and aorta are normal. She exhibits no organomegaly, mass or bruit. Genitourinary:  exam declined by the patient. Breast exam: patient declined exam  Musculoskeletal: Normal range of motion, no synovitis. She exhibits no edema. Neurological: She is alert and oriented to person, place, and time. She has normal reflexes. No cranial nerve deficit. Coordination normal.   Skin: Skin is warm and dry. There is no rash or erythema. No suspicious lesions noted. Psychiatric: She has a normal mood and affect. Her speech is normal and behavior is normal. Judgment, cognition and memory are normal.     Assessment/Plan: Alem Lam was seen today for hypertension. I spent >25 minutes face to face time reviewing patients medications, teaching on low cholesterol, low fat diet, interventions to help control blood pressure outside of pharmacotherapy. Diagnoses and all orders for this visit:    Pure hypercholesterolemia  - lovastatin (MEVACOR) 40 MG tablet; Take 1 tablet by mouth daily  -education given on heart health, low cholesterol diet  -last lab dated 8/13 results reviewed with patient     Essential hypertension  -     atenolol (TENORMIN) 50 MG tablet; TAKE ONE TABLET BY MOUTH DAILY at night  For blood pressure  -     lisinopril-hydrochlorothiazide (PRINZIDE;ZESTORETIC) 20-12.5 MG per tablet; TAKE ONE TABLET BY MOUTH DAILYin AM for blood pressure    Gastroesophageal reflux disease without esophagitis  -     famotidine (PEPCID) 20 MG tablet;  Take 1 tablet by mouth daily  -Education on avoiding spicy foods and not eating 2-3 hours prior to laying down provided to patient. Skin rash  -     clotrimazole-betamethasone (LOTRISONE) 1-0.05 % cream; Apply to affected area twice a day as needed  -Her skin rash has been stable with no exacerbations. Intact healthy today. Education on good skin care hygiene and the importance of sunscreen protection today.

## 2019-06-03 ENCOUNTER — OFFICE VISIT (OUTPATIENT)
Dept: FAMILY MEDICINE CLINIC | Age: 69
End: 2019-06-03
Payer: COMMERCIAL

## 2019-06-03 VITALS
TEMPERATURE: 97.8 F | HEART RATE: 55 BPM | WEIGHT: 193 LBS | DIASTOLIC BLOOD PRESSURE: 78 MMHG | BODY MASS INDEX: 30.29 KG/M2 | OXYGEN SATURATION: 98 % | HEIGHT: 67 IN | SYSTOLIC BLOOD PRESSURE: 136 MMHG

## 2019-06-03 DIAGNOSIS — J01.01 ACUTE RECURRENT MAXILLARY SINUSITIS: Primary | ICD-10-CM

## 2019-06-03 DIAGNOSIS — E78.00 PURE HYPERCHOLESTEROLEMIA: ICD-10-CM

## 2019-06-03 PROCEDURE — 99213 OFFICE O/P EST LOW 20 MIN: CPT | Performed by: INTERNAL MEDICINE

## 2019-06-03 RX ORDER — LOVASTATIN 40 MG/1
40 TABLET ORAL DAILY
Qty: 90 TABLET | Refills: 1 | Status: SHIPPED | OUTPATIENT
Start: 2019-06-03 | End: 2019-12-24 | Stop reason: SDUPTHER

## 2019-06-03 RX ORDER — AMOXICILLIN AND CLAVULANATE POTASSIUM 875; 125 MG/1; MG/1
1 TABLET, FILM COATED ORAL 2 TIMES DAILY WITH MEALS
Qty: 20 TABLET | Refills: 0 | Status: SHIPPED | OUTPATIENT
Start: 2019-06-03 | End: 2019-06-13

## 2019-06-03 ASSESSMENT — ENCOUNTER SYMPTOMS
SINUS COMPLAINT: 1
SORE THROAT: 1
COUGH: 1
BACK PAIN: 1
SINUS PRESSURE: 1

## 2019-06-03 NOTE — PROGRESS NOTES
Subjective:      Patient ID: Jamal Salas is a 77 y.o. female. Sinus Problem   This is a new problem. The current episode started in the past 7 days. The problem has been gradually worsening since onset. There has been no fever. The pain is moderate. Associated symptoms include congestion, coughing, ear pain, sinus pressure and a sore throat. Past treatments include nothing. ON lovastatin for hyperlipidemia. Review of Systems   HENT: Positive for congestion, ear pain, sinus pressure and sore throat. Respiratory: Positive for cough. Cardiovascular: Positive for chest pain. Endocrine: Negative. Musculoskeletal: Positive for arthralgias and back pain. Psychiatric/Behavioral: Negative. Patient Active Problem List   Diagnosis    Hypertension    Hyperlipidemia    Impaired fasting blood sugar    Gastroesophageal reflux disease without esophagitis    Positive FIT (fecal immunochemical test)       Outpatient Medications Marked as Taking for the 6/3/19 encounter (Office Visit) with Tommy Wilhelm MD   Medication Sig Dispense Refill    lovastatin (MEVACOR) 40 MG tablet Take 1 tablet by mouth daily 30 tablet 0    atenolol (TENORMIN) 50 MG tablet TAKE ONE TABLET BY MOUTH DAILY at night  For blood pressure 90 tablet 0    famotidine (PEPCID) 20 MG tablet Take 1 tablet by mouth daily 90 tablet 0    lisinopril-hydrochlorothiazide (PRINZIDE;ZESTORETIC) 20-12.5 MG per tablet TAKE ONE TABLET BY MOUTH DAILYin AM for blood pressure 90 tablet 0    clotrimazole-betamethasone (LOTRISONE) 1-0.05 % cream Apply to affected area twice a day as needed 45 g 1    Cholecalciferol (VITAMIN D) 2000 UNITS CAPS capsule Take by mouth Indications: otc      aspirin 81 MG tablet Take 81 mg by mouth daily.  OTC         No Known Allergies    Social History     Tobacco Use    Smoking status: Never Smoker    Smokeless tobacco: Never Used   Substance Use Topics    Alcohol use: No       Objective:   /78 (Site: Right Upper Arm, Position: Sitting, Cuff Size: Medium Adult)   Pulse 55   Temp 97.8 °F (36.6 °C) (Oral)   Ht 5' 7\" (1.702 m)   Wt 193 lb (87.5 kg)   SpO2 98%   Breastfeeding? No   BMI 30.23 kg/m²     Physical Exam   Constitutional: She appears well-developed and well-nourished. HENT:   Head: Normocephalic. Right Ear: External ear normal.   Left Ear: External ear normal.   Nose: Nose normal.   Mouth/Throat: Oropharynx is clear and moist. No oropharyngeal exudate. Tender maxillary bilateral and frontal sinuses areas   Neck: Normal range of motion. Neck supple. No thyromegaly present. Cardiovascular: Normal rate. Pulmonary/Chest: Effort normal.   Nursing note and vitals reviewed. Assessment:/plan:     1. Acute recurrent maxillary sinusitis    - amoxicillin-clavulanate (AUGMENTIN) 875-125 MG per tablet; Take 1 tablet by mouth 2 times daily (with meals) for 10 days  Dispense: 20 tablet; Refill: 0    2.  Pure hypercholesterolemia  -continue  lovastatin            Meldon Cooks, MD

## 2019-07-16 ENCOUNTER — TELEPHONE (OUTPATIENT)
Dept: FAMILY MEDICINE CLINIC | Age: 69
End: 2019-07-16

## 2019-07-31 ENCOUNTER — TELEPHONE (OUTPATIENT)
Dept: FAMILY MEDICINE CLINIC | Age: 69
End: 2019-07-31

## 2019-07-31 DIAGNOSIS — K21.9 GASTROESOPHAGEAL REFLUX DISEASE WITHOUT ESOPHAGITIS: ICD-10-CM

## 2019-07-31 DIAGNOSIS — I10 ESSENTIAL HYPERTENSION: ICD-10-CM

## 2019-07-31 RX ORDER — LISINOPRIL AND HYDROCHLOROTHIAZIDE 20; 12.5 MG/1; MG/1
TABLET ORAL
Qty: 90 TABLET | Refills: 1 | Status: SHIPPED | OUTPATIENT
Start: 2019-07-31 | End: 2020-01-30 | Stop reason: SDUPTHER

## 2019-07-31 RX ORDER — FAMOTIDINE 20 MG/1
20 TABLET, FILM COATED ORAL DAILY
Qty: 90 TABLET | Refills: 1 | Status: SHIPPED | OUTPATIENT
Start: 2019-07-31 | End: 2020-01-15

## 2019-07-31 RX ORDER — ATENOLOL 50 MG/1
TABLET ORAL
Qty: 90 TABLET | Refills: 1 | Status: SHIPPED | OUTPATIENT
Start: 2019-07-31 | End: 2020-01-30 | Stop reason: SDUPTHER

## 2019-12-24 RX ORDER — LOVASTATIN 40 MG/1
40 TABLET ORAL DAILY
Qty: 90 TABLET | Refills: 0 | Status: SHIPPED | OUTPATIENT
Start: 2019-12-24 | End: 2020-04-01 | Stop reason: SDUPTHER

## 2020-01-15 RX ORDER — FAMOTIDINE 20 MG/1
TABLET, FILM COATED ORAL
Qty: 90 TABLET | Refills: 0 | Status: SHIPPED | OUTPATIENT
Start: 2020-01-15 | End: 2020-04-01 | Stop reason: SDUPTHER

## 2020-01-30 RX ORDER — LISINOPRIL AND HYDROCHLOROTHIAZIDE 20; 12.5 MG/1; MG/1
TABLET ORAL
Qty: 90 TABLET | Refills: 0 | Status: SHIPPED | OUTPATIENT
Start: 2020-01-30 | End: 2020-04-01 | Stop reason: SDUPTHER

## 2020-01-30 RX ORDER — ATENOLOL 50 MG/1
TABLET ORAL
Qty: 90 TABLET | Refills: 0 | Status: SHIPPED | OUTPATIENT
Start: 2020-01-30 | End: 2020-05-01 | Stop reason: SDUPTHER

## 2020-01-30 NOTE — TELEPHONE ENCOUNTER
Last OV 6/3/19  Future Appointments   Date Time Provider Praveen Wilkerson   2/26/2020  9:40 AM MD EMERITA Nguyen

## 2020-04-01 ENCOUNTER — TELEPHONE (OUTPATIENT)
Dept: FAMILY MEDICINE CLINIC | Age: 70
End: 2020-04-01

## 2020-04-01 RX ORDER — CLOTRIMAZOLE AND BETAMETHASONE DIPROPIONATE 10; .64 MG/G; MG/G
CREAM TOPICAL
Qty: 45 G | Refills: 0 | Status: ON HOLD
Start: 2020-04-01 | End: 2021-02-04 | Stop reason: HOSPADM

## 2020-04-01 RX ORDER — LISINOPRIL AND HYDROCHLOROTHIAZIDE 20; 12.5 MG/1; MG/1
TABLET ORAL
Qty: 30 TABLET | Refills: 1 | Status: SHIPPED | OUTPATIENT
Start: 2020-04-01 | End: 2020-06-22 | Stop reason: SDUPTHER

## 2020-04-01 RX ORDER — LOVASTATIN 40 MG/1
40 TABLET ORAL DAILY
Qty: 30 TABLET | Refills: 1 | Status: SHIPPED | OUTPATIENT
Start: 2020-04-01 | End: 2020-05-01 | Stop reason: SDUPTHER

## 2020-04-01 RX ORDER — FAMOTIDINE 20 MG/1
20 TABLET, FILM COATED ORAL DAILY
Qty: 30 TABLET | Refills: 1 | Status: SHIPPED | OUTPATIENT
Start: 2020-04-01 | End: 2020-05-28 | Stop reason: SDUPTHER

## 2020-04-01 NOTE — TELEPHONE ENCOUNTER
Pt called stating that she doesn't feel comfortable coming out right now and is requesting a refill of prescriptions      lisinopril-hydrochlorothiazide (PRINZIDE;ZESTORETIC) 20-12.5 MG per tablet      famotidine (PEPCID) 20 MG tablet      lovastatin (MEVACOR) 40 MG tablet      clotrimazole-betamethasone (LOTRISONE) 1-0.05 % cream    Pharmacy:  [facility] [address] [facility],[address], [address] [phone]      Last appt  6/3/2019    No future appointments.

## 2020-04-08 ENCOUNTER — VIRTUAL VISIT (OUTPATIENT)
Dept: FAMILY MEDICINE CLINIC | Age: 70
End: 2020-04-08
Payer: COMMERCIAL

## 2020-04-08 ENCOUNTER — TELEPHONE (OUTPATIENT)
Dept: FAMILY MEDICINE CLINIC | Age: 70
End: 2020-04-08

## 2020-04-08 PROCEDURE — 99441 PR PHYS/QHP TELEPHONE EVALUATION 5-10 MIN: CPT | Performed by: NURSE PRACTITIONER

## 2020-04-08 RX ORDER — MONTELUKAST SODIUM 10 MG/1
10 TABLET ORAL NIGHTLY
Qty: 30 TABLET | Refills: 0 | Status: SHIPPED | OUTPATIENT
Start: 2020-04-08 | End: 2021-03-10

## 2020-04-08 RX ORDER — FLUTICASONE PROPIONATE 50 MCG
2 SPRAY, SUSPENSION (ML) NASAL DAILY
Qty: 16 G | Refills: 0 | Status: SHIPPED | OUTPATIENT
Start: 2020-04-08 | End: 2021-06-01 | Stop reason: SDUPTHER

## 2020-04-08 ASSESSMENT — PATIENT HEALTH QUESTIONNAIRE - PHQ9
2. FEELING DOWN, DEPRESSED OR HOPELESS: 0
SUM OF ALL RESPONSES TO PHQ9 QUESTIONS 1 & 2: 0
SUM OF ALL RESPONSES TO PHQ QUESTIONS 1-9: 0
1. LITTLE INTEREST OR PLEASURE IN DOING THINGS: 0
SUM OF ALL RESPONSES TO PHQ QUESTIONS 1-9: 0

## 2020-04-08 NOTE — TELEPHONE ENCOUNTER
Patient called and stated that her allergies have been worse than ever. Pt states she has itchy eyes and itchy throat with nasal congestion. Pt is requesting that a prescription strength allergy medicine be called into her pharmacy.   Jen Virk [place] [address] [facility],[address], [place] [address]. [address] [phone]

## 2020-04-08 NOTE — PROGRESS NOTES
Carlos Cagle is a 68 y.o. female evaluated via telephone on 4/8/2020. Consent:  She and/or health care decision maker is aware that that she may receive a bill for this telephone service, depending on her insurance coverage, and has provided verbal consent to proceed: Yes      Documentation:  I communicated with the patient and/or health care decision maker about seasonal allergies. Symptoms including nasal congestion, rhinorrhea, itchy eyes and water eyes. Takes loratadine daily. Symptoms worsening over the past week. Denies fever or ill contacts. Details of this discussion including any medical advice provided:   1. Seasonal allergic rhinitis, unspecified trigger- continue Loratadine, start Flonase and Singulair. Follow up as needed. - fluticasone (FLONASE) 50 MCG/ACT nasal spray; 2 sprays by Each Nostril route daily  Dispense: 16 g; Refill: 0  - montelukast (SINGULAIR) 10 MG tablet; Take 1 tablet by mouth nightly  Dispense: 30 tablet; Refill: 0      I affirm this is a Patient Initiated Episode with an Established Patient who has not had a related appointment within my department in the past 7 days or scheduled within the next 24 hours.     Total Time: minutes: 5-10 minutes    Note: not billable if this call serves to triage the patient into an appointment for the relevant concern      Marcia Mcmahon CNP

## 2020-05-28 RX ORDER — FAMOTIDINE 20 MG/1
20 TABLET, FILM COATED ORAL DAILY
Qty: 30 TABLET | Refills: 1 | Status: SHIPPED | OUTPATIENT
Start: 2020-05-28 | End: 2020-07-20 | Stop reason: SDUPTHER

## 2020-05-28 NOTE — TELEPHONE ENCOUNTER
Patient requesting a medication refill.   Medication: famotidine (PEPCID) 20 MG tablet   lovastatin (MEVACOR) 40 MG tablet         Pharmacy: [address], [address] [phone]  Last office visit: 4/8/2020  Next office visit: Visit date not found

## 2020-06-15 ENCOUNTER — TELEPHONE (OUTPATIENT)
Dept: FAMILY MEDICINE CLINIC | Age: 70
End: 2020-06-15

## 2020-06-15 ENCOUNTER — NURSE TRIAGE (OUTPATIENT)
Dept: OTHER | Facility: CLINIC | Age: 70
End: 2020-06-15

## 2020-06-15 ENCOUNTER — VIRTUAL VISIT (OUTPATIENT)
Dept: FAMILY MEDICINE CLINIC | Age: 70
End: 2020-06-15
Payer: COMMERCIAL

## 2020-06-15 PROCEDURE — 99214 OFFICE O/P EST MOD 30 MIN: CPT | Performed by: INTERNAL MEDICINE

## 2020-06-15 ASSESSMENT — ENCOUNTER SYMPTOMS
SORE THROAT: 0
COUGH: 0

## 2020-06-15 NOTE — TELEPHONE ENCOUNTER
Received call from Rober in [facility]. The patient's son is on the phone. Patient has history of HTN. Patient feeling chilled today and her blood pressure has been running high last week. Blood pressure today:  203/98 at 1130  203/100 at 1100  201/100 at 1030    Protocol recommendation shared and care advice shared. [facility] to call to make video chat. Please do not reply to the triage nurse through this encounter. Any subsequent communication should be directly with the patient.     Reason for Disposition   Systolic BP >= 239 OR Diastolic >= 240    Protocols used: HIGH BLOOD PRESSURE-ADULT-[place]

## 2020-06-15 NOTE — PROGRESS NOTES
identification was verified at the start of the visit: Yes    Total time spent on this encounter:25 min    Services were provided through a video synchronous discussion virtually to substitute for in-person clinic visit. Patient and provider were located at their individual homes. --Zhanna Reyes MD on 6/15/2020 at 5:02 PM    An electronic signature was used to authenticate this note.

## 2020-06-18 ENCOUNTER — NURSE TRIAGE (OUTPATIENT)
Dept: OTHER | Facility: CLINIC | Age: 70
End: 2020-06-18

## 2020-06-22 RX ORDER — ATENOLOL 50 MG/1
TABLET ORAL
Qty: 60 TABLET | Refills: 2 | Status: SHIPPED | OUTPATIENT
Start: 2020-06-22 | End: 2020-07-23 | Stop reason: SDUPTHER

## 2020-06-22 RX ORDER — LISINOPRIL AND HYDROCHLOROTHIAZIDE 20; 12.5 MG/1; MG/1
TABLET ORAL
Qty: 30 TABLET | Refills: 2 | Status: SHIPPED | OUTPATIENT
Start: 2020-06-22 | End: 2020-07-23 | Stop reason: SDUPTHER

## 2020-06-24 ENCOUNTER — OFFICE VISIT (OUTPATIENT)
Dept: FAMILY MEDICINE CLINIC | Age: 70
End: 2020-06-24
Payer: COMMERCIAL

## 2020-06-24 VITALS
TEMPERATURE: 97.6 F | DIASTOLIC BLOOD PRESSURE: 72 MMHG | HEIGHT: 67 IN | HEART RATE: 50 BPM | WEIGHT: 193 LBS | OXYGEN SATURATION: 98 % | SYSTOLIC BLOOD PRESSURE: 138 MMHG | BODY MASS INDEX: 30.29 KG/M2

## 2020-06-24 PROCEDURE — 99214 OFFICE O/P EST MOD 30 MIN: CPT | Performed by: INTERNAL MEDICINE

## 2020-06-24 PROCEDURE — 36415 COLL VENOUS BLD VENIPUNCTURE: CPT | Performed by: INTERNAL MEDICINE

## 2020-06-24 RX ORDER — AMLODIPINE BESYLATE 2.5 MG/1
2.5 TABLET ORAL DAILY
Qty: 30 TABLET | Refills: 5 | Status: ON HOLD
Start: 2020-06-24 | End: 2021-02-04 | Stop reason: HOSPADM

## 2020-06-24 ASSESSMENT — ENCOUNTER SYMPTOMS
SORE THROAT: 0
COUGH: 0

## 2020-06-24 NOTE — PROGRESS NOTES
file   Other Topics Concern    Not on file   Social History Narrative    Not on file       Vitals:    06/24/20 1155   BP: 138/72   Pulse: 50   Temp: 97.6 °F (36.4 °C)   SpO2: 98%        Body mass index is 30.23 kg/m². Physical Exam  Vitals signs and nursing note reviewed. Constitutional:       Appearance: She is well-developed. HENT:      Head: Normocephalic. Right Ear: External ear normal.      Left Ear: External ear normal.      Nose: Nose normal.      Mouth/Throat:      Pharynx: No oropharyngeal exudate. Neck:      Musculoskeletal: Normal range of motion and neck supple. Thyroid: No thyromegaly. Cardiovascular:      Rate and Rhythm: Normal rate. Pulmonary:      Effort: Pulmonary effort is normal.   Musculoskeletal: Normal range of motion. Neurological:      General: No focal deficit present. Psychiatric:         Mood and Affect: Mood normal.       ASSESSMENT/PLAN:  1. Essential hypertension    - Comprehensive Metabolic Panel  - amLODIPine (NORVASC) 2.5 MG tablet; Take 1 tablet by mouth daily  Dispense: 30 tablet; Refill: 5    2. Fatigue, unspecified type    - CBC  - TSH without Reflex    3. Pure hypercholesterolemia    - Lipid Panel  - Comprehensive Metabolic Panel    An electronic signature was used to authenticate this note.     --Colin Wren MD on 06/24/20 at 12:24 PM

## 2020-06-25 LAB
A/G RATIO: 2.5 (ref 1.1–2.2)
ALBUMIN SERPL-MCNC: 4.5 G/DL (ref 3.4–5)
ALP BLD-CCNC: 62 U/L (ref 40–129)
ALT SERPL-CCNC: 17 U/L (ref 10–40)
ANION GAP SERPL CALCULATED.3IONS-SCNC: 12 MMOL/L (ref 3–16)
AST SERPL-CCNC: 16 U/L (ref 15–37)
BILIRUB SERPL-MCNC: 0.6 MG/DL (ref 0–1)
BUN BLDV-MCNC: 16 MG/DL (ref 7–20)
CALCIUM SERPL-MCNC: 9.5 MG/DL (ref 8.3–10.6)
CHLORIDE BLD-SCNC: 100 MMOL/L (ref 99–110)
CHOLESTEROL, TOTAL: 151 MG/DL (ref 0–199)
CO2: 26 MMOL/L (ref 21–32)
CREAT SERPL-MCNC: 0.7 MG/DL (ref 0.6–1.2)
GFR AFRICAN AMERICAN: >60
GFR NON-AFRICAN AMERICAN: >60
GLOBULIN: 1.8 G/DL
GLUCOSE BLD-MCNC: 109 MG/DL (ref 70–99)
HCT VFR BLD CALC: 49.1 % (ref 36–48)
HDLC SERPL-MCNC: 36 MG/DL (ref 40–60)
HEMOGLOBIN: 16 G/DL (ref 12–16)
LDL CHOLESTEROL CALCULATED: 79 MG/DL
MCH RBC QN AUTO: 31 PG (ref 26–34)
MCHC RBC AUTO-ENTMCNC: 32.6 G/DL (ref 31–36)
MCV RBC AUTO: 95.1 FL (ref 80–100)
PDW BLD-RTO: 14.6 % (ref 12.4–15.4)
PLATELET # BLD: 260 K/UL (ref 135–450)
PMV BLD AUTO: 8.4 FL (ref 5–10.5)
POTASSIUM SERPL-SCNC: 4.6 MMOL/L (ref 3.5–5.1)
RBC # BLD: 5.17 M/UL (ref 4–5.2)
SODIUM BLD-SCNC: 138 MMOL/L (ref 136–145)
TOTAL PROTEIN: 6.3 G/DL (ref 6.4–8.2)
TRIGL SERPL-MCNC: 178 MG/DL (ref 0–150)
TSH SERPL DL<=0.05 MIU/L-ACNC: 4.06 UIU/ML (ref 0.27–4.2)
VLDLC SERPL CALC-MCNC: 36 MG/DL
WBC # BLD: 5.5 K/UL (ref 4–11)

## 2020-07-20 RX ORDER — FAMOTIDINE 20 MG/1
20 TABLET, FILM COATED ORAL DAILY
Qty: 30 TABLET | Refills: 3 | Status: SHIPPED | OUTPATIENT
Start: 2020-07-20 | End: 2020-12-21 | Stop reason: SDUPTHER

## 2020-07-20 NOTE — TELEPHONE ENCOUNTER
Future Appointments   Date Time Provider Praveen Wilkerson   9/30/2020  3:40 PM MD EMERITA Coreas [facility]   last ov 6/24/20

## 2020-07-23 RX ORDER — ATENOLOL 50 MG/1
50 TABLET ORAL 2 TIMES DAILY
Qty: 180 TABLET | Refills: 1 | Status: SHIPPED | OUTPATIENT
Start: 2020-07-23 | End: 2020-09-17 | Stop reason: SDUPTHER

## 2020-07-23 RX ORDER — LOVASTATIN 40 MG/1
40 TABLET ORAL DAILY
Qty: 90 TABLET | Refills: 1 | Status: SHIPPED | OUTPATIENT
Start: 2020-07-23 | End: 2021-03-10 | Stop reason: SDUPTHER

## 2020-07-23 RX ORDER — LISINOPRIL AND HYDROCHLOROTHIAZIDE 20; 12.5 MG/1; MG/1
TABLET ORAL
Qty: 90 TABLET | Refills: 1 | Status: SHIPPED | OUTPATIENT
Start: 2020-07-23 | End: 2020-07-25 | Stop reason: SDUPTHER

## 2020-07-23 NOTE — TELEPHONE ENCOUNTER
Pharmacy is requesting a 90 day supply.     Last appt- 6/24/2020      Future Appointments   Date Time Provider Praveen Wilkerson   9/30/2020  3:40 PM Arin Pedro MD [facility]

## 2020-07-25 ENCOUNTER — HOSPITAL ENCOUNTER (EMERGENCY)
Age: 70
Discharge: HOME | End: 2020-07-25
Attending: EMERGENCY MEDICINE
Payer: COMMERCIAL

## 2020-07-25 VITALS
BODY MASS INDEX: 29.82 KG/M2 | HEIGHT: 67 IN | SYSTOLIC BLOOD PRESSURE: 133 MMHG | RESPIRATION RATE: 14 BRPM | DIASTOLIC BLOOD PRESSURE: 53 MMHG | OXYGEN SATURATION: 98 % | WEIGHT: 190 LBS | TEMPERATURE: 98.4 F | HEART RATE: 53 BPM

## 2020-07-25 LAB
A/G RATIO: 2.2 (ref 1.1–2.2)
ALBUMIN SERPL-MCNC: 4.7 G/DL (ref 3.4–5)
ALP BLD-CCNC: 62 U/L (ref 40–129)
ALT SERPL-CCNC: 19 U/L (ref 10–40)
ANION GAP SERPL CALCULATED.3IONS-SCNC: 12 MMOL/L (ref 3–16)
AST SERPL-CCNC: 19 U/L (ref 15–37)
BACTERIA: ABNORMAL /HPF
BASOPHILS ABSOLUTE: 0 K/UL (ref 0–0.2)
BASOPHILS RELATIVE PERCENT: 0.5 %
BILIRUB SERPL-MCNC: 0.4 MG/DL (ref 0–1)
BILIRUBIN URINE: NEGATIVE
BLOOD, URINE: NEGATIVE
BUN BLDV-MCNC: 14 MG/DL (ref 7–20)
CALCIUM SERPL-MCNC: 9.5 MG/DL (ref 8.3–10.6)
CHLORIDE BLD-SCNC: 97 MMOL/L (ref 99–110)
CLARITY: CLEAR
CO2: 26 MMOL/L (ref 21–32)
COLOR: YELLOW
CREAT SERPL-MCNC: 0.7 MG/DL (ref 0.6–1.2)
EOSINOPHILS ABSOLUTE: 0.5 K/UL (ref 0–0.6)
EOSINOPHILS RELATIVE PERCENT: 5.4 %
EPITHELIAL CELLS, UA: ABNORMAL /HPF (ref 0–5)
GFR AFRICAN AMERICAN: >60
GFR NON-AFRICAN AMERICAN: >60
GLOBULIN: 2.1 G/DL
GLUCOSE BLD-MCNC: 103 MG/DL (ref 70–99)
GLUCOSE URINE: NEGATIVE MG/DL
HCT VFR BLD CALC: 49.2 % (ref 36–48)
HEMOGLOBIN: 16.5 G/DL (ref 12–16)
KETONES, URINE: NEGATIVE MG/DL
LEUKOCYTE ESTERASE, URINE: ABNORMAL
LYMPHOCYTES ABSOLUTE: 1.7 K/UL (ref 1–5.1)
LYMPHOCYTES RELATIVE PERCENT: 19.1 %
MCH RBC QN AUTO: 31.2 PG (ref 26–34)
MCHC RBC AUTO-ENTMCNC: 33.5 G/DL (ref 31–36)
MCV RBC AUTO: 93.2 FL (ref 80–100)
MICROSCOPIC EXAMINATION: YES
MONOCYTES ABSOLUTE: 0.7 K/UL (ref 0–1.3)
MONOCYTES RELATIVE PERCENT: 7.7 %
MUCUS: ABNORMAL /LPF
NEUTROPHILS ABSOLUTE: 6 K/UL (ref 1.7–7.7)
NEUTROPHILS RELATIVE PERCENT: 67.3 %
NITRITE, URINE: NEGATIVE
PDW BLD-RTO: 13.9 % (ref 12.4–15.4)
PH UA: 6 (ref 5–8)
PLATELET # BLD: 244 K/UL (ref 135–450)
PMV BLD AUTO: 7.3 FL (ref 5–10.5)
POTASSIUM REFLEX MAGNESIUM: 3.8 MMOL/L (ref 3.5–5.1)
PROTEIN UA: NEGATIVE MG/DL
RBC # BLD: 5.27 M/UL (ref 4–5.2)
RBC UA: ABNORMAL /HPF (ref 0–4)
SODIUM BLD-SCNC: 135 MMOL/L (ref 136–145)
SPECIFIC GRAVITY UA: <=1.005 (ref 1–1.03)
TOTAL PROTEIN: 6.8 G/DL (ref 6.4–8.2)
TROPONIN: <0.01 NG/ML
URINE REFLEX TO CULTURE: ABNORMAL
URINE TYPE: ABNORMAL
UROBILINOGEN, URINE: 0.2 E.U./DL
WBC # BLD: 8.9 K/UL (ref 4–11)
WBC UA: ABNORMAL /HPF (ref 0–5)

## 2020-07-25 PROCEDURE — 80053 COMPREHEN METABOLIC PANEL: CPT

## 2020-07-25 PROCEDURE — 84484 ASSAY OF TROPONIN QUANT: CPT

## 2020-07-25 PROCEDURE — 93005 ELECTROCARDIOGRAM TRACING: CPT | Performed by: EMERGENCY MEDICINE

## 2020-07-25 PROCEDURE — 81001 URINALYSIS AUTO W/SCOPE: CPT

## 2020-07-25 PROCEDURE — 99283 EMERGENCY DEPT VISIT LOW MDM: CPT

## 2020-07-25 PROCEDURE — 85025 COMPLETE CBC W/AUTO DIFF WBC: CPT

## 2020-07-25 RX ORDER — LISINOPRIL AND HYDROCHLOROTHIAZIDE 20; 12.5 MG/1; MG/1
1 TABLET ORAL 2 TIMES DAILY
Qty: 20 TABLET | Refills: 0 | Status: SHIPPED | OUTPATIENT
Start: 2020-07-25 | End: 2020-10-05

## 2020-07-25 RX ORDER — CEFUROXIME AXETIL 250 MG/1
250 TABLET ORAL 2 TIMES DAILY
Qty: 20 TABLET | Refills: 0 | Status: SHIPPED | OUTPATIENT
Start: 2020-07-25 | End: 2020-08-04

## 2020-07-25 RX ORDER — LORATADINE 10 MG/1
10 TABLET ORAL DAILY
Status: ON HOLD | COMMUNITY
End: 2021-02-04 | Stop reason: HOSPADM

## 2020-07-25 NOTE — ED PROVIDER NOTES
Emergency Physician Note    Chief Complaint  Hypertension (c/o sbp 190s over past 6 weeks. Family states pt took an extra bp pill today. )       History of Present Illness  Galindo Das is a 68 y.o. female who presents to the ED for hypertension. Patient reports that over the last several weeks she has been having elevated blood pressure measurements around 190/100. She had this even today. She denies any chest pain, tightness, heaviness or pressure. No exercise intolerance. She does have significant anxiety which is currently untreated. She states about a month ago her medications were altered to have atenolol twice a day instead of just once a day and the thiazide/lisinopril she breaks in half and takes half in the morning and half at night. This is helped somewhat. Amlodipine was also added 2.5 mg daily. She states occasionally her heart rate will be in the 40s. She denies any other acute complaints. 10 systems reviewed, pertinent positives per HPI otherwise noted to be negative    I have reviewed the following from the nursing documentation:      Prior to Admission medications    Medication Sig Start Date End Date Taking?  Authorizing Provider   loratadine (CLARITIN) 10 MG tablet Take 10 mg by mouth daily   Yes Historical Provider, MD   lovastatin (MEVACOR) 40 MG tablet Take 1 tablet by mouth daily 7/23/20  Yes Can Ceja MD   lisinopril-hydroCHLOROthiazide (PRINZIDE;ZESTORETIC) 20-12.5 MG per tablet TAKE ONE TABLET BY MOUTH DAILYin AM for blood pressure 7/23/20  Yes Can Ceja MD   atenolol (TENORMIN) 50 MG tablet Take 1 tablet by mouth 2 times daily 7/23/20  Yes Can Ceja MD   famotidine (PEPCID) 20 MG tablet Take 1 tablet by mouth daily 7/20/20 8/19/20 Yes Can Ceja MD   amLODIPine (NORVASC) 2.5 MG tablet Take 1 tablet by mouth daily 6/24/20  Yes Can Ceja MD   Cholecalciferol (VITAMIN D) 2000 UNITS CAPS capsule Take by mouth Indications: otc   Yes Historical Provider, MD   aspirin 81 MG tablet Take 81 mg by mouth daily. OTC   Yes Historical Provider, MD   fluticasone (FLONASE) 50 MCG/ACT nasal spray 2 sprays by Each Nostril route daily 4/8/20   RADHA Salazar CNP   montelukast (SINGULAIR) 10 MG tablet Take 1 tablet by mouth nightly 4/8/20   RADHA Salazar CNP   clotrimazole-betamethasone (Titi Confer) 1-0.05 % cream Apply to affected area twice a day as needed 4/1/20   Gina Gil MD       Allergies as of 07/25/2020    (No Known Allergies)       Past Medical History:   Diagnosis Date    GERD (gastroesophageal reflux disease)     Hyperlipidemia     Hypertension         Surgical History:   Past Surgical History:   Procedure Laterality Date    CHOLECYSTECTOMY  09/01    HYSTERECTOMY, TOTAL ABDOMINAL  1999    TONSILLECTOMY  1964    TUBAL LIGATION  1972        Family History:    Family History   Problem Relation Age of Onset    Diabetes Mother     Heart Disease Mother     High Blood Pressure Mother     High Cholesterol Mother     Vision Loss Mother     Cancer Father        Social History     Socioeconomic History    Marital status:       Spouse name: Not on file    Number of children: Not on file    Years of education: Not on file    Highest education level: Not on file   Occupational History    Not on file   Social Needs    Financial resource strain: Not on file    Food insecurity     Worry: Not on file     Inability: Not on file    Transportation needs     Medical: Not on file     Non-medical: Not on file   Tobacco Use    Smoking status: Never Smoker    Smokeless tobacco: Never Used   Substance and Sexual Activity    Alcohol use: No    Drug use: No    Sexual activity: Yes     Partners: Male   Lifestyle    Physical activity     Days per week: Not on file     Minutes per session: Not on file    Stress: Not on file   Relationships    Social connections     Talks on phone: Not on file     Gets together: Not on file     Attends Yazdanism service: Not on file     Active member of club or organization: Not on file     Attends meetings of clubs or organizations: Not on file     Relationship status: Not on file    Intimate partner violence     Fear of current or ex partner: Not on file     Emotionally abused: Not on file     Physically abused: Not on file     Forced sexual activity: Not on file   Other Topics Concern    Not on file   Social History Narrative    Not on file       Nursing notes reviewed. ED Triage Vitals [07/25/20 1756]   Enc Vitals Group      BP (!) 175/78      Pulse 74      Resp 12      Temp 98.5 °F (36.9 °C)      Temp Source Oral      SpO2 99 %      Weight 190 lb (86.2 kg)      Height 5' 7\" (1.702 m)      Head Circumference       Peak Flow       Pain Score       Pain Loc       Pain Edu? Excl. in [address]? GENERAL:  Awake, alert. Well developed, well nourished with no apparent distress. HENT:  Normocephalic, Atraumatic, moist mucous membranes. EYES:  Pupils equal round and reactive to light, Conjunctiva normal, extraocular movements normal.  NECK:  No meningeal signs, Supple. CHEST:  Regular rate and rhythm, chest wall non-tender. LUNGS:  Clear to auscultation bilaterally. ABDOMEN:  Soft, non-tender, no rebound, rigidity or guarding, non-distended, normal bowel sounds. No costovertebral angle tenderness to palpation. BACK:  No tenderness. EXTREMITIES:  Normal range of motion, no edema, no bony tenderness, no deformity, distal pulses present. SKIN: Warm, dry and intact. NEUROLOGIC: Normal mental status. Moving all extremities to command. LABS and DIAGNOSTIC RESULTS  EKG  The Ekg interpreted by me shows  normal sinus rhythm with a rate of 75  Axis is   Left axis deviation  QTc is  normal  Intervals and Durations are unremarkable. ST Segments: normal  Delta waves, Brugada Syndrome, and Short SC are not present.   No significant change from prior EKG dated 2/20/14    LABS  Labs Reviewed   CBC WITH AUTO DIFFERENTIAL - Abnormal; Notable for the following components:       Result Value    RBC 5.27 (*)     Hemoglobin 16.5 (*)     Hematocrit 49.2 (*)     All other components within normal limits    Narrative:     Performed at:  [facility],  [place], [place]   Phone [phone]   COMPREHENSIVE METABOLIC PANEL W/ REFLEX TO MG FOR LOW K - Abnormal; Notable for the following components:    Sodium 135 (*)     Chloride 97 (*)     Glucose 103 (*)     All other components within normal limits    Narrative:     Performed at:  [facility],  [place], [place]   Phone [phone]   URINE RT REFLEX TO CULTURE - Abnormal; Notable for the following components:    Leukocyte Esterase, Urine TRACE (*)     All other components within normal limits    Narrative:     Performed at:  [facility],  [place]   Phone [phone]   MICROSCOPIC URINALYSIS - Abnormal; Notable for the following components:    Mucus, UA Rare (*)     WBC, UA 6-9 (*)     Bacteria, UA 3+ (*)     All other components within normal limits    Narrative:     Performed at:  [facility] [address],  [place]   Phone [phone]   TROPONIN    Narrative:     Performed at:  [facility]) [facility],  [place], [place]   Phone [phone]     MEDICAL DECISION MAKING    I spoke with the nurse practitioner on-call for Dr. Byron Joseph, I discussed my plan of doubling lisinopril/thiazide pill and taking a full pill morning and night and they were agreeable with the plan. I advised also that they discuss anxiety treatment during the next office visit.   I advised the patient to return to the emergency department immediately for any new or worsening symptoms, such as chest pain or shortness of breath. The patient voiced agreement and understanding of the treatment plan. I estimate there is LOW risk for ACUTE CORONARY SYNDROME, INTRACRANIAL HEMORRHAGE, MALIGNANT DYSRHYTHMIA, MENINGITIS, PNEUMONIA, PULMONARY EMBOLISM, SEPSIS, SUBARACHNOID HEMORRHAGE, SUBDURAL HEMATOMA, STROKE, or URINARY TRACT INFECTION, thus I consider the discharge disposition reasonable. Tiffany Perez and I have discussed the diagnosis and risks, and we agree with discharging home to follow-up with their primary doctor. We also discussed returning to the Emergency Department immediately if new or worsening symptoms occur. We have discussed the symptoms which are most concerning (e.g., changing or worsening pain, weakness, vomiting, fever) that necessitate immediate return. Final Impression    1. Uncontrolled hypertension    2. Urinary tract infection without hematuria, site unspecified    3. Essential hypertension    4. Anxiety state        Blood pressure 133/61, pulse 58, temperature 98.5 °F (36.9 °C), temperature source Oral, resp. rate 10, height 5' 7\" (1.702 m), weight 190 lb (86.2 kg), SpO2 95 %, not currently breastfeeding. Patient was given scripts for the following medications. I counseled patient how to take these medications. New Prescriptions    CEFUROXIME (CEFTIN) 250 MG TABLET    Take 1 tablet by mouth 2 times daily for 10 days       Disposition  Pt is in good condition upon Discharge to home. This chart was generated using the [address] [place] [address] [address] dictation system. I created this record but it may contain dictation errors.          Ashwini Rowell MD  07/25/20 2005

## 2020-07-26 ENCOUNTER — TELEPHONE (OUTPATIENT)
Dept: FAMILY MEDICINE CLINIC | Age: 70
End: 2020-07-26

## 2020-07-26 LAB
EKG ATRIAL RATE: 75 BPM
EKG DIAGNOSIS: NORMAL
EKG P AXIS: 71 DEGREES
EKG P-R INTERVAL: 176 MS
EKG Q-T INTERVAL: 424 MS
EKG QRS DURATION: 104 MS
EKG QTC CALCULATION (BAZETT): 473 MS
EKG R AXIS: -32 DEGREES
EKG T AXIS: 45 DEGREES
EKG VENTRICULAR RATE: 75 BPM

## 2020-07-26 PROCEDURE — 93010 ELECTROCARDIOGRAM REPORT: CPT | Performed by: INTERNAL MEDICINE

## 2020-07-26 NOTE — TELEPHONE ENCOUNTER
On Call communication:  Call from Dr. Rianna Lira at the ER. [facility] presented to the ER with elevation of BP, 190/, and 200/. Taking atenolol BID and amoldipine 2.5 in the morning. Lisinopril 20/12.5 taking 1/2 tablet BID. ER increased lisinopril to 1 tablet 2 times daily and recommends consideration for anxiety. Dr. Rianna Lira feels her anxiety may be causing the BP to fluctuate. Recommended for her to contact office for follow up appt.

## 2020-07-27 NOTE — TELEPHONE ENCOUNTER
She can have a phone visit next week or tomorrow at 10 if she is very anxious. Tell her ER increase her lisinopril , make sure she has her bp readings when I talk to her.

## 2020-07-27 NOTE — TELEPHONE ENCOUNTER
I spoke with the pt she does not have accesses to a camera and you are book on all your Wednesdays until Late September. Where do you want us to put her or have do you want us to scheduled her?

## 2020-07-28 ENCOUNTER — VIRTUAL VISIT (OUTPATIENT)
Dept: FAMILY MEDICINE CLINIC | Age: 70
End: 2020-07-28
Payer: COMMERCIAL

## 2020-07-28 PROCEDURE — G2012 BRIEF CHECK IN BY MD/QHP: HCPCS | Performed by: INTERNAL MEDICINE

## 2020-07-28 RX ORDER — BUSPIRONE HYDROCHLORIDE 7.5 MG/1
7.5 TABLET ORAL DAILY PRN
Qty: 30 TABLET | Refills: 1 | Status: SHIPPED | OUTPATIENT
Start: 2020-07-28 | End: 2021-03-10

## 2020-07-28 NOTE — PROGRESS NOTES
Eva Canales is a 68 y.o. female evaluated via telephone on 7/28/2020. Consent:  She and/or health care decision maker is aware that that she may receive a bill for this telephone service, depending on her insurance coverage, and has provided verbal consent to proceed: Yes      Documentation:  I communicated with the patient and/or health care decision maker about . Details of this discussion including any medical advice provided:      Hypertension- better, on amlodipine 2.5 mg daily. Lisinopril/hctz 20/12.5 mg bid                                      Atenolol 50 mg bid   UTI- on cephalexin  From the ER  Anxiety- will start on buspar as needed    I affirm this is a Patient Initiated Episode with a Patient who has not had a related appointment within my department in the past 7 days or scheduled within the next 24 hours.     Patient identification was verified at the start of the visit: Yes    Total Time::12 min    Note: not billable if this call serves to triage the patient into an appointment for the relevant concern      Marilee Wu

## 2020-09-17 RX ORDER — ATENOLOL 50 MG/1
50 TABLET ORAL 2 TIMES DAILY
Qty: 180 TABLET | Refills: 1 | Status: SHIPPED | OUTPATIENT
Start: 2020-09-17 | End: 2021-03-10 | Stop reason: SDUPTHER

## 2020-09-17 RX ORDER — CLOTRIMAZOLE AND BETAMETHASONE DIPROPIONATE 10; .64 MG/G; MG/G
CREAM TOPICAL
Qty: 1 TUBE | Refills: 2 | Status: ON HOLD
Start: 2020-09-17 | End: 2021-02-04 | Stop reason: HOSPADM

## 2020-09-17 NOTE — TELEPHONE ENCOUNTER
----- Message from Amber Urrutia sent at 9/17/2020  1:38 PM EDT -----  Subject: Message to Provider    QUESTIONS  Information for Provider? Pt requested a refill on atenolol (TENORMIN) 50   MG tablet. Patient has about four pills left. [facility] is   preferred pharmacy.   ---------------------------------------------------------------------------  --------------  Louie [facility] VERÓNICA  What is the best way for the office to contact you? OK to leave message on   voicemail  Preferred Call Back Phone Number? [phone]  ---------------------------------------------------------------------------  --------------  SCRIPT ANSWERS  Relationship to Patient?  Self

## 2020-09-17 NOTE — TELEPHONE ENCOUNTER
----- Message from Blancasabrinarochelle Perry sent at 9/17/2020  1:37 PM EDT -----  Subject: Refill Request    QUESTIONS  Name of Medication? clotrimazole-betamethasone (LOTRISONE) 1-0.05 % cream  Patient-reported dosage and instructions? pt uses cream as needed   How many days do you have left? 0  Preferred Pharmacy? [facility] [address] [facility] [phone]  Pharmacy phone number (if available)? [phone]  Additional Information for Provider?   ---------------------------------------------------------------------------  --------------  [address]  What is the best way for the office to contact you? OK to leave message on   voicemail  Preferred Call Back Phone Number?  [phone]

## 2020-09-23 ENCOUNTER — OFFICE VISIT (OUTPATIENT)
Dept: FAMILY MEDICINE CLINIC | Age: 70
End: 2020-09-23
Payer: COMMERCIAL

## 2020-09-23 VITALS
DIASTOLIC BLOOD PRESSURE: 64 MMHG | TEMPERATURE: 97.6 F | OXYGEN SATURATION: 99 % | BODY MASS INDEX: 30.45 KG/M2 | WEIGHT: 194 LBS | SYSTOLIC BLOOD PRESSURE: 139 MMHG | HEIGHT: 67 IN | HEART RATE: 51 BPM

## 2020-09-23 PROCEDURE — G0008 ADMIN INFLUENZA VIRUS VAC: HCPCS | Performed by: INTERNAL MEDICINE

## 2020-09-23 PROCEDURE — 90694 VACC AIIV4 NO PRSRV 0.5ML IM: CPT | Performed by: INTERNAL MEDICINE

## 2020-09-23 PROCEDURE — G0439 PPPS, SUBSEQ VISIT: HCPCS | Performed by: INTERNAL MEDICINE

## 2020-09-23 ASSESSMENT — PATIENT HEALTH QUESTIONNAIRE - PHQ9
2. FEELING DOWN, DEPRESSED OR HOPELESS: 0
1. LITTLE INTEREST OR PLEASURE IN DOING THINGS: 0
SUM OF ALL RESPONSES TO PHQ9 QUESTIONS 1 & 2: 0
SUM OF ALL RESPONSES TO PHQ QUESTIONS 1-9: 0
SUM OF ALL RESPONSES TO PHQ QUESTIONS 1-9: 0

## 2020-09-23 ASSESSMENT — LIFESTYLE VARIABLES: HOW OFTEN DO YOU HAVE A DRINK CONTAINING ALCOHOL: 0

## 2020-09-23 NOTE — PROGRESS NOTES
Vaccine Information Sheet, \"Influenza - Inactivated\"  given to Donald Araujo, or parent/legal guardian of  Donald Araujo and verbalized understanding. Patient responses:    Have you ever had a reaction to a flu vaccine? No  Do you have any current illness? No  Have you ever had Guillian [place] Syndrome? No  Do you have a serious allergy to any of the follow: Neomycin, Polymyxin, Thimerosal, eggs or egg products? No    Flu vaccine given per order. Please see immunization tab. Risks and benefits explained. Current VIS given.       Immunizations Administered     Name Date Dose Route    Influenza, Quadv, adjuvanted, 65 yrs +, IM, PF (Fluad) 9/23/2020 0.5 mL Intramuscular    Site: Deltoid- Left    Lot: [id number]    NDC: 22541-127-78

## 2020-09-23 NOTE — PROGRESS NOTES
Medicare Annual Wellness Visit  Name: Kingston Parry Date: 2020   MRN: [id number] Sex: Female   Age: 68 y.o. Ethnicity: Non-/Non    : 1950 Race: Joelle Staley is here for Medicare AWV    Screenings for behavioral, psychosocial and functional/safety risks, and cognitive dysfunction are all negative except as indicated below. These results, as well as other patient data from the [address] form, are documented in Flowsheets linked to this Encounter. No Known Allergies    Prior to Visit Medications    Medication Sig Taking? Authorizing Provider   clotrimazole-betamethasone (LOTRISONE) 1-0.05 % cream Apply topically 2 times daily. Yes Apple Doshi MD   atenolol (TENORMIN) 50 MG tablet Take 1 tablet by mouth 2 times daily Yes Apple Doshi MD   busPIRone (BUSPAR) 7.5 MG tablet Take 1 tablet by mouth daily as needed (anxiety) Yes Apple Doshi MD   loratadine (CLARITIN) 10 MG tablet Take 10 mg by mouth daily Yes Historical Provider, MD   lovastatin (MEVACOR) 40 MG tablet Take 1 tablet by mouth daily Yes Apple Doshi MD   amLODIPine (NORVASC) 2.5 MG tablet Take 1 tablet by mouth daily Yes Apple Doshi MD   fluticasone [facility]) 50 MCG/ACT nasal spray 2 sprays by Each Nostril route daily Yes RADHA Carey CNP   montelukast (SINGULAIR) 10 MG tablet Take 1 tablet by mouth nightly Yes RADHA Carey CNP   clotrimazole-betamethasone (LOTRISONE) 1-0.05 % cream Apply to affected area twice a day as needed Yes Apple Doshi MD   Cholecalciferol (VITAMIN D) 2000 UNITS CAPS capsule Take by mouth Indications: otc Yes Historical Provider, MD   aspirin 81 MG tablet Take 81 mg by mouth daily.  OTC Yes Historical Provider, MD   lisinopril-hydroCHLOROthiazide (PRINZIDE;ZESTORETIC) 20-12.5 MG per tablet Take 1 tablet by mouth 2 times daily for 10 days TAKE ONE TABLET BY MOUTH DAILYin AM for blood pressure  Jeremiah Fernandez MD   famotidine functional/safety risks, and cognitive dysfunction are all negative except as indicated below. These results, as well as other patient data from the [address] form, are documented in Flowsheets linked to this Encounter. No Known Allergies    Prior to Visit Medications    Medication Sig Taking? Authorizing Provider   clotrimazole-betamethasone (LOTRISONE) 1-0.05 % cream Apply topically 2 times daily. Yes Dalia Leroy MD   atenolol (TENORMIN) 50 MG tablet Take 1 tablet by mouth 2 times daily Yes Dalia Leroy MD   busPIRone (BUSPAR) 7.5 MG tablet Take 1 tablet by mouth daily as needed (anxiety) Yes Dalia Leroy MD   loratadine (CLARITIN) 10 MG tablet Take 10 mg by mouth daily Yes Historical Provider, MD   lovastatin (MEVACOR) 40 MG tablet Take 1 tablet by mouth daily Yes Dalia Leroy MD   amLODIPine (NORVASC) 2.5 MG tablet Take 1 tablet by mouth daily Yes Dalia Leroy MD   fluticasone [facility]) 50 MCG/ACT nasal spray 2 sprays by Each Nostril route daily Yes RADHA Ramirez CNP   montelukast (SINGULAIR) 10 MG tablet Take 1 tablet by mouth nightly Yes RADHA Ramirez CNP   clotrimazole-betamethasone (LOTRISONE) 1-0.05 % cream Apply to affected area twice a day as needed Yes Dalia Leroy MD   Cholecalciferol (VITAMIN D) 2000 UNITS CAPS capsule Take by mouth Indications: otc Yes Historical Provider, MD   aspirin 81 MG tablet Take 81 mg by mouth daily.  OTC Yes Historical Provider, MD   lisinopril-hydroCHLOROthiazide (PRINZIDE;ZESTORETIC) 20-12.5 MG per tablet Take 1 tablet by mouth 2 times daily for 10 days TAKE ONE TABLET BY MOUTH DAILYin AM for blood pressure  Joesph Ramírez MD   famotidine (PEPCID) 20 MG tablet Take 1 tablet by mouth daily  Dalia Leroy MD       Past Medical History:   Diagnosis Date    GERD (gastroesophageal reflux disease)     Hyperlipidemia     Hypertension        Past Surgical History:   Procedure Laterality Date    No  Hearing/Vision Interventions:  · none    Personalized Preventive Plan   Current Health Maintenance Status  Immunization History   Administered Date(s) Administered    Influenza Vaccine, unspecified formulation 02/16/2017    Influenza, High Dose (Fluzone 65 yrs and older) 02/16/2017, 11/28/2017    Influenza, Quadv, adjuvanted, 65 yrs +, IM, PF (Fluad) 09/23/2020    Pneumococcal Conjugate 13-valent ([id number]) 08/17/2017    Pneumococcal Polysaccharide ([id number]) 02/02/2016        Health Maintenance   Topic Date Due    Hepatitis C screen  1950    A1C test (Diabetic or Prediabetic)  10/15/1958    DTaP/Tdap/Td vaccine (1 - Tdap) 10/15/1967    Colon Cancer Screen FIT/FOBT  01/25/2019    Annual Wellness Visit (AWV)  05/29/2019    Breast cancer screen  08/13/2020    Shingles Vaccine (1 of 2) 09/23/2021 (Originally 10/15/1998)    Lipid screen  06/24/2021    Flu vaccine  Completed    Pneumococcal 65+ years Vaccine  Completed    DEXA (modify frequency per FRAX score)  Addressed    Hepatitis A vaccine  Aged Out    Hepatitis B vaccine  Aged Out    Hib vaccine  Aged Out    Meningococcal (ACWY) vaccine  Aged Out     Recommendations for ONE RECOVERY Due: see orders and patient instructions/AVS.  . Recommended screening schedule for the next 5-10 years is provided to the patient in written form: see Patient Paul Wong was seen today for medicare awv. Diagnoses and all orders for this visit:    Screening for breast cancer  -     CHRISTIANO DIGITAL SCREEN W OR WO CAD BILATERAL; Future    Screening for colon cancer  -     Cologuard;  Future    Needs flu shot    Routine general medical examination at a health care facility    Screening for colorectal cancer    Other orders  -     INFLUENZA, QUADV, ADJUVANTED, 65 YRS =, IM, PF, PREFILL SYR, 0.5ML (FLUAD)

## 2020-09-26 NOTE — PATIENT INSTRUCTIONS
Learning About Colt Mcfarland  What is a living will? A living will, also called a declaration, is a legal form. It tells your family and your doctor your wishes when you can't speak for yourself. It's used by the health professionals who will treat you as you near the end of your life or if you get seriously hurt or ill. If you put your wishes in writing, your loved ones and others will know what kind of care you want. They won't need to guess. This can ease your mind and be helpful to others. And you can change or cancel your living will at any time. A living will is not the same as an estate or property will. An estate will explains what you want to happen with your money and property after you die. How do you use it? A living will is used to describe the kinds of treatment or life support you want as you near the end of your life or if you get seriously hurt or ill. Keep these facts in mind about living bowers. · Your living will is used only if you can't speak or make decisions for yourself. Most often, one or more doctors must certify that you can't speak or decide for yourself before your living will takes effect. · If you get better and can speak for yourself again, you can accept or refuse any treatment. It doesn't matter what you said in your living will. · Some states may limit your right to refuse treatment in certain cases. For example, you may need to clearly state in your living will that you don't want artificial hydration and nutrition, such as being fed through a tube. Is a living will a legal document? A living will is a legal document. Each state has its own laws about living bowers. And a living will may be called something else in your state. Here are some things to know about living bowers. · You don't need an  to complete a living will. But legal advice can be helpful if your state's laws are unclear.  It can also help if your health history is complicated or your family can't agree on what should be in your living will. · You can change your living will at any time. Some people find that their wishes about end-of-life care change as their health changes. If you make big changes to your living will, complete a new form. · If you move to another state, make sure that your living will is legal in the state where you now live. In most cases, doctors will respect your wishes even if you have a form from a different state. · You might use a universal form that has been approved by many states. This kind of form can sometimes be filled out and stored online. Your digital copy will then be available wherever you have a connection to the internet. The doctors and nurses who need to treat you can find it right away. · Your state may offer an online registry. This is another place where you can store your living will online. · It's a good idea to get your living will notarized. This means using a person called a  to watch two people sign, or witness, your living will. What should you know when you create a living will? Here are some questions to ask yourself as you make your living will:  · Do you know enough about life support methods that might be used? If not, talk to your doctor so you know what might be done if you can't breathe on your own, your heart stops, or you can't swallow. · What things would you still want to be able to do after you receive life-support methods? Would you want to be able to walk? To speak? To eat on your own? To live without the help of machines? · Do you want certain Restorationism practices performed if you become very ill? · If you have a choice, where do you want to be cared for? In your home? At a hospital or nursing home? · If you have a choice at the end of your life, where would you prefer to die? At home? In a hospital or nursing home? Somewhere else? · Would you prefer to be buried or cremated?   · Do you want your organs to be donated after you die? What should you do with your living will? · Make sure that your family members and your health care agent have copies of your living will (also called a declaration). · Give your doctor a copy of your living will. Ask him or her to keep it as part of your medical record. If you have more than one doctor, make sure that each one has a copy. · Put a copy of your living will where it can be easily found. For example, some people may put a copy on their refrigerator door. If you are using a digital copy, be sure your doctor, family members, and health care agent know how to find and access it. Where can you learn more? Go to [email or web address] org and sign in to your MUJIN account. Enter L761 in the AppSheet box to learn more about \"Learning About Living [employer]. \"     If you do not have an account, please click on the \"Sign Up Now\" link. Current as of: December 9, 2019               Content Version: 12.5  © 4413-5518 Healthwise, [employer]. Care instructions adapted under license by [facility] [place] ([facility]). If you have questions about a medical condition or this instruction, always ask your healthcare professional. [address] any warranty or liability for your use of this information. Personalized Preventive Plan for Lidia Butterfield - 9/23/2020  Medicare offers a range of preventive health benefits. Some of the tests and screenings are paid in full while other may be subject to a deductible, co-insurance, and/or copay. Some of these benefits include a comprehensive review of your medical history including lifestyle, illnesses that may run in your family, and various assessments and screenings as appropriate. After reviewing your medical record and screening and assessments performed today your provider may have ordered immunizations, labs, imaging, and/or referrals for you.   A list of these orders (if applicable) as well as your Preventive Care list are included within your After Visit Summary for your review. Other Preventive Recommendations:    · A preventive eye exam performed by an eye specialist is recommended every 1-2 years to screen for glaucoma; cataracts, macular degeneration, and other eye disorders. · A preventive dental visit is recommended every 6 months. · Try to get at least 150 minutes of exercise per week or 10,000 steps per day on a pedometer . · Order or download the FREE \"Exercise & Physical Activity: Your Everyday Guide\" from [employer]. Call 8-176.112.8305 or search [employer] online. · You need 4986-6819 mg of calcium and 3994-9484 IU of vitamin D per day. It is possible to meet your calcium requirement with diet alone, but a vitamin D supplement is usually necessary to meet this goal.  · When exposed to the sun, use a sunscreen that protects against both UVA and UVB radiation with an SPF of 30 or greater. Reapply every 2 to 3 hours or after sweating, drying off with a towel, or swimming. · Always wear a seat belt when traveling in a car. Always wear a helmet when riding a bicycle or motorcycle.

## 2020-10-05 ENCOUNTER — TELEPHONE (OUTPATIENT)
Dept: FAMILY MEDICINE CLINIC | Age: 70
End: 2020-10-05

## 2020-10-05 ENCOUNTER — TELEPHONE (OUTPATIENT)
Dept: ADMINISTRATIVE | Age: 70
End: 2020-10-05

## 2020-10-05 RX ORDER — LISINOPRIL AND HYDROCHLOROTHIAZIDE 20; 12.5 MG/1; MG/1
1 TABLET ORAL 2 TIMES DAILY
Qty: 60 TABLET | Refills: 2 | Status: SHIPPED | OUTPATIENT
Start: 2020-10-05 | End: 2020-10-29 | Stop reason: SDUPTHER

## 2020-10-05 NOTE — TELEPHONE ENCOUNTER
Pharmacist needs clarification of directions for medication. Directions states below that patient take 1 tablet by mouth 2 times daily and take one tablet by mouth daily. Is it 2 times daily or one tablet daily? Patient has been out of medication for two days. lisinopril-hydroCHLOROthiazide (PRINZIDE;ZESTORETIC) 20-12.5 MG per tablet [[id number]]     Order Details   Dose: 1 tablet  Route: Oral  Frequency: 2 TIMES DAILY    Dispense Quantity: 60 tablet  Refills: 2  Fills remaining: --             Sig: Take 1 tablet by mouth 2 times daily TAKE ONE TABLET BY MOUTH DAILYin AM for blood pressure            Written Date: 10/05/20  Expiration Date: 10/05/21      Start Date: 10/05/20  End Date: 12/04/20 after 120 doses      Ordering Provider:  --  Authorizing Provider: Luis Watters MD  Ordering User:   Luis Watters MD           Diagnosis Association: Essential hypertension (I10)       Original Order:  lisinopril-hydroCHLOROthiazide (PRINZIDE;ZESTORETIC) 20-12.5 MG per tablet [[id number]]       Pharmacy:  [facility] [address] [place] [address] [phone] [facility] [phone]    Pharmacy Comments: --

## 2020-10-05 NOTE — TELEPHONE ENCOUNTER
----- Message from Teresa Peres sent at 10/2/2020  2:14 PM EDT -----  Subject: Message to Provider    QUESTIONS  Information for Provider? pt. said that she needs new prescription for   Lisinopril saying that she is taking two-a-day. medication was not listed   when tried to request refill. she is totally out of medication.  ---------------------------------------------------------------------------  --------------  CALL BACK INFO  What is the best way for the office to contact you? OK to leave message on   voicemail  Preferred Call Back Phone Number? [phone]  ---------------------------------------------------------------------------  --------------  SCRIPT ANSWERS  Relationship to Patient?  Self

## 2020-10-05 NOTE — TELEPHONE ENCOUNTER
Padmini Mistry from [facility] in [facility] called in stating that they needed to know the directions for the patients medication Lisinopril 20-12.5mg  Call back number [phone]

## 2020-10-05 NOTE — TELEPHONE ENCOUNTER
----- Message from Jenniefr Sandoval sent at 10/2/2020  2:14 PM EDT -----  Subject: Message to Provider    QUESTIONS  Information for Provider? pt. said that she needs new prescription for   Lisinopril saying that she is taking two-a-day. medication was not listed   when tried to request refill. she is totally out of medication.  ---------------------------------------------------------------------------  --------------  CALL BACK INFO  What is the best way for the office to contact you? OK to leave message on   voicemail  Preferred Call Back Phone Number? [phone]  ---------------------------------------------------------------------------  --------------  SCRIPT ANSWERS  Relationship to Patient?  Self

## 2020-10-29 RX ORDER — LISINOPRIL AND HYDROCHLOROTHIAZIDE 20; 12.5 MG/1; MG/1
1 TABLET ORAL 2 TIMES DAILY
Qty: 60 TABLET | Refills: 2 | Status: SHIPPED | OUTPATIENT
Start: 2020-10-29 | End: 2020-12-21 | Stop reason: SDUPTHER

## 2020-12-08 ENCOUNTER — OFFICE VISIT (OUTPATIENT)
Dept: FAMILY MEDICINE CLINIC | Age: 70
End: 2020-12-08
Payer: COMMERCIAL

## 2020-12-08 VITALS
RESPIRATION RATE: 16 BRPM | TEMPERATURE: 97.5 F | SYSTOLIC BLOOD PRESSURE: 130 MMHG | OXYGEN SATURATION: 97 % | HEART RATE: 66 BPM | BODY MASS INDEX: 30.23 KG/M2 | WEIGHT: 193 LBS | DIASTOLIC BLOOD PRESSURE: 78 MMHG

## 2020-12-08 LAB
A/G RATIO: 2.2 (ref 1.1–2.2)
ALBUMIN SERPL-MCNC: 4.3 G/DL (ref 3.4–5)
ALP BLD-CCNC: 68 U/L (ref 40–129)
ALT SERPL-CCNC: 16 U/L (ref 10–40)
ANION GAP SERPL CALCULATED.3IONS-SCNC: 10 MMOL/L (ref 3–16)
AST SERPL-CCNC: 15 U/L (ref 15–37)
BILIRUB SERPL-MCNC: 0.5 MG/DL (ref 0–1)
BUN BLDV-MCNC: 18 MG/DL (ref 7–20)
CALCIUM SERPL-MCNC: 9.7 MG/DL (ref 8.3–10.6)
CHLORIDE BLD-SCNC: 102 MMOL/L (ref 99–110)
CHOLESTEROL, TOTAL: 168 MG/DL (ref 0–199)
CO2: 28 MMOL/L (ref 21–32)
CREAT SERPL-MCNC: 0.7 MG/DL (ref 0.6–1.2)
GFR AFRICAN AMERICAN: >60
GFR NON-AFRICAN AMERICAN: >60
GLOBULIN: 2 G/DL
GLUCOSE BLD-MCNC: 105 MG/DL (ref 70–99)
HDLC SERPL-MCNC: 37 MG/DL (ref 40–60)
LDL CHOLESTEROL CALCULATED: 94 MG/DL
POTASSIUM SERPL-SCNC: 4.4 MMOL/L (ref 3.5–5.1)
SODIUM BLD-SCNC: 140 MMOL/L (ref 136–145)
TOTAL PROTEIN: 6.3 G/DL (ref 6.4–8.2)
TRIGL SERPL-MCNC: 183 MG/DL (ref 0–150)
VLDLC SERPL CALC-MCNC: 37 MG/DL

## 2020-12-08 PROCEDURE — 99214 OFFICE O/P EST MOD 30 MIN: CPT | Performed by: INTERNAL MEDICINE

## 2020-12-08 PROCEDURE — 36415 COLL VENOUS BLD VENIPUNCTURE: CPT | Performed by: INTERNAL MEDICINE

## 2020-12-08 RX ORDER — FLUTICASONE PROPIONATE 50 MCG
2 SPRAY, SUSPENSION (ML) NASAL DAILY
Qty: 1 BOTTLE | Refills: 5 | Status: ON HOLD
Start: 2020-12-08 | End: 2021-02-04 | Stop reason: HOSPADM

## 2020-12-08 ASSESSMENT — PATIENT HEALTH QUESTIONNAIRE - PHQ9
SUM OF ALL RESPONSES TO PHQ QUESTIONS 1-9: 0
SUM OF ALL RESPONSES TO PHQ QUESTIONS 1-9: 0
2. FEELING DOWN, DEPRESSED OR HOPELESS: 0
SUM OF ALL RESPONSES TO PHQ9 QUESTIONS 1 & 2: 0
1. LITTLE INTEREST OR PLEASURE IN DOING THINGS: 0
SUM OF ALL RESPONSES TO PHQ QUESTIONS 1-9: 0

## 2020-12-08 ASSESSMENT — ENCOUNTER SYMPTOMS
SORE THROAT: 0
COUGH: 0

## 2020-12-08 NOTE — PROGRESS NOTES
Medication Sig Dispense Refill    lisinopril-hydroCHLOROthiazide (PRINZIDE;ZESTORETIC) 20-12.5 MG per tablet Take 1 tablet by mouth 2 times daily TAKE ONE TABLET BY MOUTH DAILYin AM for blood pressure 60 tablet 2    clotrimazole-betamethasone (LOTRISONE) 1-0.05 % cream Apply topically 2 times daily. 1 Tube 2    atenolol (TENORMIN) 50 MG tablet Take 1 tablet by mouth 2 times daily 180 tablet 1    busPIRone (BUSPAR) 7.5 MG tablet Take 1 tablet by mouth daily as needed (anxiety) 30 tablet 1    loratadine (CLARITIN) 10 MG tablet Take 10 mg by mouth daily      lovastatin (MEVACOR) 40 MG tablet Take 1 tablet by mouth daily 90 tablet 1    famotidine (PEPCID) 20 MG tablet Take 1 tablet by mouth daily 30 tablet 3    amLODIPine (NORVASC) 2.5 MG tablet Take 1 tablet by mouth daily 30 tablet 5    fluticasone (FLONASE) 50 MCG/ACT nasal spray 2 sprays by Each Nostril route daily 16 g 0    montelukast (SINGULAIR) 10 MG tablet Take 1 tablet by mouth nightly 30 tablet 0    clotrimazole-betamethasone (LOTRISONE) 1-0.05 % cream Apply to affected area twice a day as needed 45 g 0    Cholecalciferol (VITAMIN D) 2000 UNITS CAPS capsule Take by mouth Indications: otc      aspirin 81 MG tablet Take 81 mg by mouth daily. OTC       No current facility-administered medications for this visit. Social History     Socioeconomic History    Marital status:       Spouse name: Not on file    Number of children: Not on file    Years of education: Not on file    Highest education level: Not on file   Occupational History    Not on file   Social Needs    Financial resource strain: Not on file    Food insecurity     Worry: Not on file     Inability: Not on file    Transportation needs     Medical: Not on file     Non-medical: Not on file   Tobacco Use    Smoking status: Never Smoker    Smokeless tobacco: Never Used   Substance and Sexual Activity    Alcohol use: No    Drug use: No    Sexual activity: Yes Partners: Male   Lifestyle    Physical activity     Days per week: Not on file     Minutes per session: Not on file    Stress: Not on file   Relationships    Social connections     Talks on phone: Not on file     Gets together: Not on file     Attends Mu-ism service: Not on file     Active member of club or organization: Not on file     Attends meetings of clubs or organizations: Not on file     Relationship status: Not on file    Intimate partner violence     Fear of current or ex partner: Not on file     Emotionally abused: Not on file     Physically abused: Not on file     Forced sexual activity: Not on file   Other Topics Concern    Not on file   Social History Narrative    Not on file           Physical Exam  Vitals signs and nursing note reviewed. Constitutional:       Appearance: She is well-developed. HENT:      Head: Normocephalic. Right Ear: External ear normal.      Left Ear: External ear normal.      Nose: Nose normal.      Mouth/Throat:      Pharynx: No oropharyngeal exudate. Neck:      Musculoskeletal: Normal range of motion and neck supple. Thyroid: No thyromegaly. Cardiovascular:      Rate and Rhythm: Normal rate. Pulmonary:      Effort: Pulmonary effort is normal.   Musculoskeletal: Normal range of motion. Neurological:      General: No focal deficit present. Psychiatric:         Mood and Affect: Mood normal.         ASSESSMENT/PLAN:  1. Encounter for screening mammogram for breast cancer    - [facility] Digital Screen Bilateral [[id number]]; Future    2. Essential hypertension    - Comprehensive Metabolic Panel    3. Pure hypercholesterolemia    - Lipid Panel    4. Encounter for FIT (fecal immunochemical test) screening    - POCT FECAL IMMUNOCHEMICAL TEST (FIT); Future    5. Allergic rhinitis, unspecified seasonality, unspecified trigger    - fluticasone (FLONASE) 50 MCG/ACT nasal spray; 2 sprays by Each Nostril route daily  Dispense: 1 Bottle; Refill: 5    6.  Impaired fasting blood sugar  -controlled with diet    7. Positive FIT (fecal immunochemical test)  -refused colonoscopy    8. Gastroesophageal reflux disease without esophagitis  On pepcid            An electronic signature was used to authenticate this note.     --Wade Ballard MD on 12/07/20 at 10:37 PM

## 2020-12-21 ENCOUNTER — TELEPHONE (OUTPATIENT)
Dept: FAMILY MEDICINE CLINIC | Age: 70
End: 2020-12-21

## 2020-12-21 RX ORDER — FAMOTIDINE 20 MG/1
20 TABLET, FILM COATED ORAL DAILY
Qty: 30 TABLET | Refills: 3 | Status: SHIPPED | OUTPATIENT
Start: 2020-12-21 | End: 2021-06-01 | Stop reason: SDUPTHER

## 2020-12-21 RX ORDER — LISINOPRIL AND HYDROCHLOROTHIAZIDE 20; 12.5 MG/1; MG/1
1 TABLET ORAL 2 TIMES DAILY
Qty: 60 TABLET | Refills: 2 | Status: SHIPPED | OUTPATIENT
Start: 2020-12-21 | End: 2020-12-22

## 2020-12-21 NOTE — TELEPHONE ENCOUNTER
Pt requesting refill- she also states the medication lisinopril needs to state take 2x daily, because they are not filling it for that.    lisinopril-hydroCHLOROthiazide (PRINZIDE;ZESTORETIC) 20-12.5 MG per tablet    famotidine (PEPCID) 20 MG tablet       Pharmacy    [facility] [address] [facility]      Last appt.   12/8/2020    Future Appointments   Date Time Provider Praveen Wilkerson   3/10/2021  9:00 AM Estelle Dasilva MD [facility]

## 2020-12-22 ENCOUNTER — TELEPHONE (OUTPATIENT)
Dept: FAMILY MEDICINE CLINIC | Age: 70
End: 2020-12-22

## 2020-12-22 RX ORDER — LISINOPRIL AND HYDROCHLOROTHIAZIDE 20; 12.5 MG/1; MG/1
1 TABLET ORAL 2 TIMES DAILY
Qty: 60 TABLET | Refills: 2 | Status: SHIPPED | OUTPATIENT
Start: 2020-12-22 | End: 2021-03-26 | Stop reason: SDUPTHER

## 2020-12-22 NOTE — TELEPHONE ENCOUNTER
Dr. Falk Fix we discontinued previous Rx sent in it had to sets of directions. Can you please authorize new Rx?

## 2021-02-01 ENCOUNTER — TELEPHONE (OUTPATIENT)
Dept: FAMILY MEDICINE CLINIC | Age: 71
End: 2021-02-01

## 2021-02-01 ENCOUNTER — NURSE TRIAGE (OUTPATIENT)
Dept: OTHER | Facility: CLINIC | Age: 71
End: 2021-02-01

## 2021-02-01 NOTE — TELEPHONE ENCOUNTER
Patient called her pulse has been elevated for the past few days in the low 100's  BP yesterday 1/31 136/90  BP today 93/64 pulse 101    Should she seen or what do you advise

## 2021-02-01 NOTE — TELEPHONE ENCOUNTER
Reason for Disposition   Side (flank) or lower back pain present    Answer Assessment - Initial Assessment Questions  1. SYMPTOM: \"What's the main symptom you're concerned about? \" (e.g., frequency, incontinence)      Back pain, frequency    2. ONSET: \"When did the  symptoms  start? \"      Sunday morning    3. PAIN: \"Is there any pain? \" If so, ask: \"How bad is it? \" (Scale: 1-10; mild, moderate, severe)      Denies    4. CAUSE: \"What do you think is causing the symptoms? \"      Unsure    5. OTHER SYMPTOMS: \"Do you have any other symptoms? \" (e.g., fever, flank pain, blood in urine, pain with urination)     Pulse is over 100, HA, Abd pain on Sunday, fatigue    6. PREGNANCY: \"Is there any chance you are pregnant? \" \"When was your last menstrual period? \"      NA    Protocols used: [facility]    Patient called [place])  with red flag complaint. Brief description of triage: see above    Triage indicates for patient to be seen within 24 hours for symptoms    Care advice provided, patient verbalizes understanding; denies any other questions or concerns; instructed to call back for any new or worsening symptoms. Writer provided warm transfer to [facility]  at [facility] for appointment scheduling. Attention Provider: Thank you for allowing me to participate in the care of your patient. The patient was connected to triage in response to information provided to the [facility]. Please do not respond through this encounter as the response is not directed to a shared pool.

## 2021-02-02 ENCOUNTER — APPOINTMENT (OUTPATIENT)
Dept: CT IMAGING | Age: 71
DRG: 309 | End: 2021-02-02
Payer: COMMERCIAL

## 2021-02-02 ENCOUNTER — HOSPITAL ENCOUNTER (INPATIENT)
Age: 71
LOS: 2 days | Discharge: HOME | DRG: 309 | End: 2021-02-04
Attending: EMERGENCY MEDICINE | Admitting: HOSPITALIST
Payer: COMMERCIAL

## 2021-02-02 ENCOUNTER — OFFICE VISIT (OUTPATIENT)
Dept: FAMILY MEDICINE CLINIC | Age: 71
End: 2021-02-02
Payer: COMMERCIAL

## 2021-02-02 ENCOUNTER — APPOINTMENT (OUTPATIENT)
Dept: GENERAL RADIOLOGY | Age: 71
DRG: 309 | End: 2021-02-02
Payer: COMMERCIAL

## 2021-02-02 VITALS
HEIGHT: 67 IN | OXYGEN SATURATION: 97 % | BODY MASS INDEX: 30.23 KG/M2 | WEIGHT: 192.6 LBS | SYSTOLIC BLOOD PRESSURE: 136 MMHG | TEMPERATURE: 97.5 F | HEART RATE: 162 BPM | DIASTOLIC BLOOD PRESSURE: 86 MMHG

## 2021-02-02 DIAGNOSIS — I47.1 SVT (SUPRAVENTRICULAR TACHYCARDIA) (HCC): ICD-10-CM

## 2021-02-02 DIAGNOSIS — I48.91 NEW ONSET A-FIB (HCC): ICD-10-CM

## 2021-02-02 DIAGNOSIS — I48.91 ATRIAL FIBRILLATION WITH RVR (HCC): Primary | ICD-10-CM

## 2021-02-02 DIAGNOSIS — R94.31 ACUTE ELECTROCARDIOGRAM CHANGES: ICD-10-CM

## 2021-02-02 DIAGNOSIS — R00.0 TACHYCARDIA: Primary | ICD-10-CM

## 2021-02-02 DIAGNOSIS — R35.0 URINARY FREQUENCY: ICD-10-CM

## 2021-02-02 DIAGNOSIS — N39.0 ACUTE UTI: ICD-10-CM

## 2021-02-02 PROBLEM — I47.10 SVT (SUPRAVENTRICULAR TACHYCARDIA): Status: ACTIVE | Noted: 2021-02-02

## 2021-02-02 PROBLEM — R79.89 ELEVATED LACTIC ACID LEVEL: Status: ACTIVE | Noted: 2021-02-02

## 2021-02-02 PROBLEM — E01.0 THYROMEGALY: Status: ACTIVE | Noted: 2021-02-02

## 2021-02-02 LAB
A/G RATIO: 1.6 (ref 1.1–2.2)
ALBUMIN SERPL-MCNC: 4.2 G/DL (ref 3.4–5)
ALP BLD-CCNC: 66 U/L (ref 40–129)
ALT SERPL-CCNC: 18 U/L (ref 10–40)
ANION GAP SERPL CALCULATED.3IONS-SCNC: 12 MMOL/L (ref 3–16)
APTT: 31.1 SEC (ref 24.2–36.2)
AST SERPL-CCNC: 18 U/L (ref 15–37)
BACTERIA: ABNORMAL /HPF
BASOPHILS ABSOLUTE: 0.1 K/UL (ref 0–0.2)
BASOPHILS RELATIVE PERCENT: 0.6 %
BILIRUB SERPL-MCNC: 0.5 MG/DL (ref 0–1)
BILIRUBIN URINE: NEGATIVE
BLOOD, URINE: NEGATIVE
BUN BLDV-MCNC: 19 MG/DL (ref 7–20)
CALCIUM SERPL-MCNC: 9.9 MG/DL (ref 8.3–10.6)
CHLORIDE BLD-SCNC: 99 MMOL/L (ref 99–110)
CLARITY: CLEAR
CO2: 25 MMOL/L (ref 21–32)
COLOR: YELLOW
CREAT SERPL-MCNC: 0.9 MG/DL (ref 0.6–1.2)
D DIMER: 507 NG/ML DDU (ref 0–229)
EKG ATRIAL RATE: 192 BPM
EKG DIAGNOSIS: NORMAL
EKG Q-T INTERVAL: 258 MS
EKG QRS DURATION: 96 MS
EKG QTC CALCULATION (BAZETT): 464 MS
EKG R AXIS: -28 DEGREES
EKG T AXIS: 151 DEGREES
EKG VENTRICULAR RATE: 195 BPM
EOSINOPHILS ABSOLUTE: 0.4 K/UL (ref 0–0.6)
EOSINOPHILS RELATIVE PERCENT: 3.5 %
EPITHELIAL CELLS, UA: ABNORMAL /HPF (ref 0–5)
GFR AFRICAN AMERICAN: >60
GFR NON-AFRICAN AMERICAN: >60
GLOBULIN: 2.6 G/DL
GLUCOSE BLD-MCNC: 109 MG/DL (ref 70–99)
GLUCOSE URINE: NEGATIVE MG/DL
HCT VFR BLD CALC: 50.6 % (ref 36–48)
HEMOGLOBIN: 16.8 G/DL (ref 12–16)
INR BLD: 1 (ref 0.86–1.14)
KETONES, URINE: NEGATIVE MG/DL
LACTIC ACID: 1.5 MMOL/L (ref 0.4–2)
LACTIC ACID: 3 MMOL/L (ref 0.4–2)
LEUKOCYTE ESTERASE, URINE: ABNORMAL
LYMPHOCYTES ABSOLUTE: 4 K/UL (ref 1–5.1)
LYMPHOCYTES RELATIVE PERCENT: 38.4 %
MCH RBC QN AUTO: 30.3 PG (ref 26–34)
MCHC RBC AUTO-ENTMCNC: 33.2 G/DL (ref 31–36)
MCV RBC AUTO: 91.1 FL (ref 80–100)
MICROSCOPIC EXAMINATION: YES
MONOCYTES ABSOLUTE: 1.3 K/UL (ref 0–1.3)
MONOCYTES RELATIVE PERCENT: 12 %
NEUTROPHILS ABSOLUTE: 4.8 K/UL (ref 1.7–7.7)
NEUTROPHILS RELATIVE PERCENT: 45.5 %
NITRITE, URINE: POSITIVE
PDW BLD-RTO: 14.1 % (ref 12.4–15.4)
PH UA: 6 (ref 5–8)
PLATELET # BLD: 321 K/UL (ref 135–450)
PMV BLD AUTO: 7.5 FL (ref 5–10.5)
POTASSIUM SERPL-SCNC: 3.5 MMOL/L (ref 3.5–5.1)
PRO-BNP: 1952 PG/ML (ref 0–124)
PROCALCITONIN: 0.04 NG/ML (ref 0–0.15)
PROTEIN UA: NEGATIVE MG/DL
PROTHROMBIN TIME: 11.6 SEC (ref 10–13.2)
RBC # BLD: 5.55 M/UL (ref 4–5.2)
RBC UA: ABNORMAL /HPF (ref 0–4)
SODIUM BLD-SCNC: 136 MMOL/L (ref 136–145)
SPECIFIC GRAVITY UA: 1.01 (ref 1–1.03)
TOTAL PROTEIN: 6.8 G/DL (ref 6.4–8.2)
TROPONIN: <0.01 NG/ML
URINE TYPE: ABNORMAL
UROBILINOGEN, URINE: 0.2 E.U./DL
WBC # BLD: 10.5 K/UL (ref 4–11)
WBC UA: ABNORMAL /HPF (ref 0–5)

## 2021-02-02 PROCEDURE — 96374 THER/PROPH/DIAG INJ IV PUSH: CPT

## 2021-02-02 PROCEDURE — 85730 THROMBOPLASTIN TIME PARTIAL: CPT

## 2021-02-02 PROCEDURE — 99213 OFFICE O/P EST LOW 20 MIN: CPT | Performed by: PHYSICIAN ASSISTANT

## 2021-02-02 PROCEDURE — 96361 HYDRATE IV INFUSION ADD-ON: CPT

## 2021-02-02 PROCEDURE — 85610 PROTHROMBIN TIME: CPT

## 2021-02-02 PROCEDURE — 2580000003 HC RX 258: Performed by: HOSPITALIST

## 2021-02-02 PROCEDURE — 6360000002 HC RX W HCPCS: Performed by: HOSPITALIST

## 2021-02-02 PROCEDURE — 84484 ASSAY OF TROPONIN QUANT: CPT

## 2021-02-02 PROCEDURE — 84439 ASSAY OF FREE THYROXINE: CPT

## 2021-02-02 PROCEDURE — 71045 X-RAY EXAM CHEST 1 VIEW: CPT

## 2021-02-02 PROCEDURE — 84443 ASSAY THYROID STIM HORMONE: CPT

## 2021-02-02 PROCEDURE — 6360000002 HC RX W HCPCS

## 2021-02-02 PROCEDURE — 2580000003 HC RX 258: Performed by: EMERGENCY MEDICINE

## 2021-02-02 PROCEDURE — 84145 PROCALCITONIN (PCT): CPT

## 2021-02-02 PROCEDURE — 81001 URINALYSIS AUTO W/SCOPE: CPT

## 2021-02-02 PROCEDURE — 2500000003 HC RX 250 WO HCPCS: Performed by: EMERGENCY MEDICINE

## 2021-02-02 PROCEDURE — 1200000000 HC SEMI PRIVATE

## 2021-02-02 PROCEDURE — 36415 COLL VENOUS BLD VENIPUNCTURE: CPT

## 2021-02-02 PROCEDURE — 83605 ASSAY OF LACTIC ACID: CPT

## 2021-02-02 PROCEDURE — 96375 TX/PRO/DX INJ NEW DRUG ADDON: CPT

## 2021-02-02 PROCEDURE — 71260 CT THORAX DX C+: CPT

## 2021-02-02 PROCEDURE — 83036 HEMOGLOBIN GLYCOSYLATED A1C: CPT

## 2021-02-02 PROCEDURE — 93005 ELECTROCARDIOGRAM TRACING: CPT | Performed by: EMERGENCY MEDICINE

## 2021-02-02 PROCEDURE — 6360000004 HC RX CONTRAST MEDICATION: Performed by: EMERGENCY MEDICINE

## 2021-02-02 PROCEDURE — 80053 COMPREHEN METABOLIC PANEL: CPT

## 2021-02-02 PROCEDURE — 93010 ELECTROCARDIOGRAM REPORT: CPT | Performed by: INTERNAL MEDICINE

## 2021-02-02 PROCEDURE — 85379 FIBRIN DEGRADATION QUANT: CPT

## 2021-02-02 PROCEDURE — 85025 COMPLETE CBC W/AUTO DIFF WBC: CPT

## 2021-02-02 PROCEDURE — 83880 ASSAY OF NATRIURETIC PEPTIDE: CPT

## 2021-02-02 PROCEDURE — 99284 EMERGENCY DEPT VISIT MOD MDM: CPT

## 2021-02-02 PROCEDURE — 87040 BLOOD CULTURE FOR BACTERIA: CPT

## 2021-02-02 PROCEDURE — 6360000002 HC RX W HCPCS: Performed by: EMERGENCY MEDICINE

## 2021-02-02 PROCEDURE — 93000 ELECTROCARDIOGRAM COMPLETE: CPT | Performed by: PHYSICIAN ASSISTANT

## 2021-02-02 PROCEDURE — 6370000000 HC RX 637 (ALT 250 FOR IP): Performed by: HOSPITALIST

## 2021-02-02 RX ORDER — DEXTROSE MONOHYDRATE 50 MG/ML
100 INJECTION, SOLUTION INTRAVENOUS PRN
Status: DISCONTINUED | OUTPATIENT
Start: 2021-02-02 | End: 2021-02-04 | Stop reason: HOSPADM

## 2021-02-02 RX ORDER — POTASSIUM CHLORIDE 7.45 MG/ML
10 INJECTION INTRAVENOUS PRN
Status: DISCONTINUED | OUTPATIENT
Start: 2021-02-02 | End: 2021-02-04 | Stop reason: HOSPADM

## 2021-02-02 RX ORDER — MAGNESIUM SULFATE IN WATER 40 MG/ML
2000 INJECTION, SOLUTION INTRAVENOUS PRN
Status: DISCONTINUED | OUTPATIENT
Start: 2021-02-02 | End: 2021-02-04 | Stop reason: HOSPADM

## 2021-02-02 RX ORDER — ATORVASTATIN CALCIUM 10 MG/1
10 TABLET, FILM COATED ORAL DAILY
Status: DISCONTINUED | OUTPATIENT
Start: 2021-02-02 | End: 2021-02-04 | Stop reason: HOSPADM

## 2021-02-02 RX ORDER — ONDANSETRON 2 MG/ML
4 INJECTION INTRAMUSCULAR; INTRAVENOUS EVERY 6 HOURS PRN
Status: DISCONTINUED | OUTPATIENT
Start: 2021-02-02 | End: 2021-02-04 | Stop reason: HOSPADM

## 2021-02-02 RX ORDER — NICOTINE POLACRILEX 4 MG
15 LOZENGE BUCCAL PRN
Status: DISCONTINUED | OUTPATIENT
Start: 2021-02-02 | End: 2021-02-04 | Stop reason: HOSPADM

## 2021-02-02 RX ORDER — ACETAMINOPHEN 650 MG/1
650 SUPPOSITORY RECTAL EVERY 6 HOURS PRN
Status: DISCONTINUED | OUTPATIENT
Start: 2021-02-02 | End: 2021-02-04 | Stop reason: HOSPADM

## 2021-02-02 RX ORDER — DEXTROSE MONOHYDRATE 25 G/50ML
12.5 INJECTION, SOLUTION INTRAVENOUS PRN
Status: DISCONTINUED | OUTPATIENT
Start: 2021-02-02 | End: 2021-02-04 | Stop reason: HOSPADM

## 2021-02-02 RX ORDER — POTASSIUM CHLORIDE 20 MEQ/1
40 TABLET, EXTENDED RELEASE ORAL PRN
Status: DISCONTINUED | OUTPATIENT
Start: 2021-02-02 | End: 2021-02-04 | Stop reason: HOSPADM

## 2021-02-02 RX ORDER — FAMOTIDINE 20 MG/1
20 TABLET, FILM COATED ORAL DAILY
Status: DISCONTINUED | OUTPATIENT
Start: 2021-02-02 | End: 2021-02-04 | Stop reason: HOSPADM

## 2021-02-02 RX ORDER — ADENOSINE 3 MG/ML
INJECTION, SOLUTION INTRAVENOUS
Status: COMPLETED
Start: 2021-02-02 | End: 2021-02-02

## 2021-02-02 RX ORDER — PROMETHAZINE HYDROCHLORIDE 25 MG/1
12.5 TABLET ORAL EVERY 6 HOURS PRN
Status: DISCONTINUED | OUTPATIENT
Start: 2021-02-02 | End: 2021-02-04 | Stop reason: HOSPADM

## 2021-02-02 RX ORDER — DIGOXIN 0.25 MG/ML
400 INJECTION INTRAMUSCULAR; INTRAVENOUS ONCE
Status: COMPLETED | OUTPATIENT
Start: 2021-02-02 | End: 2021-02-02

## 2021-02-02 RX ORDER — DILTIAZEM HYDROCHLORIDE 5 MG/ML
10 INJECTION INTRAVENOUS ONCE
Status: COMPLETED | OUTPATIENT
Start: 2021-02-02 | End: 2021-02-02

## 2021-02-02 RX ORDER — SENNA PLUS 8.6 MG/1
1 TABLET ORAL DAILY PRN
Status: DISCONTINUED | OUTPATIENT
Start: 2021-02-02 | End: 2021-02-04 | Stop reason: HOSPADM

## 2021-02-02 RX ORDER — HYDROCHLOROTHIAZIDE 12.5 MG/1
12.5 CAPSULE, GELATIN COATED ORAL DAILY
Status: DISCONTINUED | OUTPATIENT
Start: 2021-02-03 | End: 2021-02-04 | Stop reason: HOSPADM

## 2021-02-02 RX ORDER — FLUTICASONE PROPIONATE 50 MCG
2 SPRAY, SUSPENSION (ML) NASAL DAILY
Status: DISCONTINUED | OUTPATIENT
Start: 2021-02-02 | End: 2021-02-04 | Stop reason: HOSPADM

## 2021-02-02 RX ORDER — LISINOPRIL 20 MG/1
20 TABLET ORAL 2 TIMES DAILY
Status: DISCONTINUED | OUTPATIENT
Start: 2021-02-03 | End: 2021-02-04 | Stop reason: HOSPADM

## 2021-02-02 RX ORDER — ACETAMINOPHEN 325 MG/1
650 TABLET ORAL EVERY 6 HOURS PRN
Status: DISCONTINUED | OUTPATIENT
Start: 2021-02-02 | End: 2021-02-04 | Stop reason: HOSPADM

## 2021-02-02 RX ORDER — CETIRIZINE HYDROCHLORIDE 5 MG/1
5 TABLET ORAL DAILY
Status: DISCONTINUED | OUTPATIENT
Start: 2021-02-02 | End: 2021-02-04 | Stop reason: HOSPADM

## 2021-02-02 RX ORDER — SODIUM CHLORIDE 9 MG/ML
INJECTION, SOLUTION INTRAVENOUS CONTINUOUS
Status: ACTIVE | OUTPATIENT
Start: 2021-02-02 | End: 2021-02-03

## 2021-02-02 RX ORDER — ASPIRIN 81 MG/1
81 TABLET ORAL DAILY
Status: DISCONTINUED | OUTPATIENT
Start: 2021-02-02 | End: 2021-02-04 | Stop reason: HOSPADM

## 2021-02-02 RX ORDER — SODIUM CHLORIDE 9 MG/ML
1000 INJECTION, SOLUTION INTRAVENOUS ONCE
Status: COMPLETED | OUTPATIENT
Start: 2021-02-02 | End: 2021-02-02

## 2021-02-02 RX ORDER — ATENOLOL 25 MG/1
50 TABLET ORAL 2 TIMES DAILY
Status: DISCONTINUED | OUTPATIENT
Start: 2021-02-02 | End: 2021-02-04 | Stop reason: HOSPADM

## 2021-02-02 RX ADMIN — DIGOXIN 400 MCG: 250 INJECTION, SOLUTION INTRAMUSCULAR; INTRAVENOUS; PARENTERAL at 14:27

## 2021-02-02 RX ADMIN — ATORVASTATIN CALCIUM 10 MG: 10 TABLET, FILM COATED ORAL at 21:13

## 2021-02-02 RX ADMIN — SODIUM CHLORIDE: 9 INJECTION, SOLUTION INTRAVENOUS at 18:42

## 2021-02-02 RX ADMIN — ENOXAPARIN SODIUM 80 MG: 80 INJECTION SUBCUTANEOUS at 21:14

## 2021-02-02 RX ADMIN — DILTIAZEM HYDROCHLORIDE 10 MG: 5 INJECTION INTRAVENOUS at 14:39

## 2021-02-02 RX ADMIN — SODIUM CHLORIDE 1000 ML: 9 INJECTION, SOLUTION INTRAVENOUS at 14:17

## 2021-02-02 RX ADMIN — ADENOSINE 6 MG: 3 INJECTION, SOLUTION INTRAVENOUS at 14:11

## 2021-02-02 RX ADMIN — ASPIRIN 81 MG: 81 TABLET, COATED ORAL at 21:13

## 2021-02-02 RX ADMIN — CEFTRIAXONE SODIUM 1000 MG: 1 INJECTION, POWDER, FOR SOLUTION INTRAMUSCULAR; INTRAVENOUS at 17:41

## 2021-02-02 RX ADMIN — ATENOLOL 50 MG: 25 TABLET ORAL at 21:13

## 2021-02-02 RX ADMIN — IOPAMIDOL 75 ML: 755 INJECTION, SOLUTION INTRAVENOUS at 15:29

## 2021-02-02 ASSESSMENT — PATIENT HEALTH QUESTIONNAIRE - PHQ9
SUM OF ALL RESPONSES TO PHQ QUESTIONS 1-9: 0
2. FEELING DOWN, DEPRESSED OR HOPELESS: 0
1. LITTLE INTEREST OR PLEASURE IN DOING THINGS: 0
SUM OF ALL RESPONSES TO PHQ QUESTIONS 1-9: 0
SUM OF ALL RESPONSES TO PHQ QUESTIONS 1-9: 0

## 2021-02-02 NOTE — ED NOTES
[facility], ED tech. , Dr Ellen Sinclair, primary nurse, and [facility], ED nurse at bedside for cardioversion with adenosine.       Cerdick Crandall RN  02/02/21 [id number]

## 2021-02-02 NOTE — PROGRESS NOTES
SUBJECTIVE: Paul Max is a 65 y.o. female who complains of urinary frequency, urgency and urge incontinence x 3 days, without flank pain, fever,  or abnormal vaginal discharge or bleeding. Has had chills and back pain. No medications taken. Has had UTI's in the past but this seems worse. Pulse has been elevated and she has felt off since Saturday night. Sunday some slight palpitation sensation in the chest. Stomach has been upset. OBJECTIVE: Appears well, in no apparent distress. Vital signs show tachycardia otherwise normal.The abdomen is soft with vague diffuse tenderness,, no guarding, mass, rebound or organomegaly. No CVA tenderness or inguinal adenopathy noted. Cardiac exam with irregular rhythm. ASSESSMENT:   Diagnosis Orders   1. Tachycardia  EKG 12 lead   2. New onset a-fib ([address]     3. Acute electrocardiogram changes     4. Urinary frequency         PLAN:   Unable to provide urine sample today. With her cardiac symptoms will transfer to ER for evaluation.

## 2021-02-02 NOTE — H&P
HOSPITALISTS HISTORY AND PHYSICAL    2/2/2021 4:48 PM    Patient Information:  Magnolia Marinelli is a 65 y.o. female [id number]  PCP:  Hemanth Wesley MD (Tel: [phone] )    Chief complaint:    Chief Complaint   Patient presents with    Tachycardia     Pt was at PCP office and came over to ED for new onset afib evaluation. pt reports \"my stomach felt jumpy\". pt denies SOB, weakness, or CP. 's        History of Present Illness: Irina Branch is a 65 y.o. female who presented to the ED from her PCPs office after noted to have new onset atrial arrhythmia with pulse rate into the 170s. She had scheduled an appointment with her PCP due to symptoms of urinary frequency, urgency and incontinence ongoing for 3 days PTA. Patient has remote history of UTI but reports that her symptoms usually do not involve rigors and CVA tenderness. Upon arrival to the ED, patient was noted to be tachycardic into the rate of 195, and EKG confirmed SVT with LVH. IV adenosine was administered, but she remained extremely tachycardic and mildly hypotensive therefore a loading dose of digoxin was provided. Patient also required a one-time dose of IV diltiazem for further rate reduction. Labs obtained upon arrival were notable with a BNP of 1952 and lactate of 3.0 likely due to tachyarrhythmia and poor tissue oxygenation. UA suggestive of infection. Chest CT with PE protocol was performed and failed to reveal any emboli or cardiopulmonary disease. It did reveal a heterogenous enlarged thyroid gland which will require further evaluation. History obtained from patient and review of epic chart      REVIEW OF SYSTEMS:   Constitutional: Negative for fever; positive for rigors in the setting of suspected UTI; positive generalized weakness x3 days  ENT: Negative for rhinorrhea, epistaxis, hoarseness, sore throat.   Respiratory: Negative for shortness of breath,wheezing  Cardiovascular: Negative for chest pain, positive palpitations ongoing x2 days  Gastrointestinal: Positive for nausea without vomiting, diarrhea  Genitourinary: Positive for polyuria, dysuria   Hematologic/Lymphatic: Negative for bleeding tendency, easy bruising  Musculoskeletal: Negative for myalgias and arthralgias  Neurologic: Negative for confusion,dysarthria. Skin: Negative for itching,rash  Psychiatric: Negative for depression; positive anxiety  Endocrine: Negative for polydipsia,polyuria,heat /cold intolerance. Past Medical History:   has a past medical history of GERD (gastroesophageal reflux disease), Hyperlipidemia, and Hypertension. Past Surgical History:   has a past surgical history that includes Hysterectomy, total abdominal (1999); Tubal ligation (1972); Cholecystectomy (09/01); and Tonsillectomy (1964). Medications:  No current facility-administered medications on file prior to encounter. Current Outpatient Medications on File Prior to Encounter   Medication Sig Dispense Refill    lisinopril-hydroCHLOROthiazide (PRINZIDE;ZESTORETIC) 20-12.5 MG per tablet Take 1 tablet by mouth 2 times daily 60 tablet 2    famotidine (PEPCID) 20 MG tablet Take 1 tablet by mouth daily 30 tablet 3    fluticasone (FLONASE) 50 MCG/ACT nasal spray 2 sprays by Each Nostril route daily (Patient not taking: Reported on 2/2/2021) 1 Bottle 5    clotrimazole-betamethasone (LOTRISONE) 1-0.05 % cream Apply topically 2 times daily.  (Patient not taking: Reported on 2/2/2021) 1 Tube 2    atenolol (TENORMIN) 50 MG tablet Take 1 tablet by mouth 2 times daily 180 tablet 1    busPIRone (BUSPAR) 7.5 MG tablet Take 1 tablet by mouth daily as needed (anxiety) (Patient not taking: Reported on 2/2/2021) 30 tablet 1    loratadine (CLARITIN) 10 MG tablet Take 10 mg by mouth daily      lovastatin (MEVACOR) 40 MG tablet Take 1 tablet by mouth daily 90 tablet 1    amLODIPine (NORVASC) 2.5 MG tablet Take 1 tablet by mouth daily (Patient not taking: Reported on 2/2/2021) 30 tablet 5    fluticasone (FLONASE) 50 MCG/ACT nasal spray 2 sprays by Each Nostril route daily 16 g 0    montelukast (SINGULAIR) 10 MG tablet Take 1 tablet by mouth nightly (Patient not taking: Reported on 2/2/2021) 30 tablet 0    clotrimazole-betamethasone (LOTRISONE) 1-0.05 % cream Apply to affected area twice a day as needed (Patient not taking: Reported on 2/2/2021) 45 g 0    Cholecalciferol (VITAMIN D) 2000 UNITS CAPS capsule Take by mouth Indications: otc      aspirin 81 MG tablet Take 81 mg by mouth daily. OTC         Allergies:  No Known Allergies     Social History:  Patient Lives  reports that she has never smoked. She has never used smokeless tobacco. She reports that she does not drink alcohol or use drugs. Family History:  family history includes Cancer in her father; Diabetes in her mother; Heart Disease in her mother; High Blood Pressure in her mother; High Cholesterol in her mother; Vision Loss in her mother. Physical Exam:  /64   Pulse 98   Temp 97.9 °F (36.6 °C) (Oral)   Resp 21   Ht 5' 7\" (1.702 m)   Wt 187 lb (84.8 kg)   SpO2 96%   BMI 29.29 kg/m²     General appearance:  Appears comfortable. Well nourished  Eyes: Sclera clear, pupils equal  ENT: Dry mucus membranes, no thrush. Trachea midline; thyroid enlargement appreciated R>L  Cardiovascular: Regular rhythm mildly tachycardic, normal S1, S2. No murmur, gallop, rub. No edema in lower extremities  Respiratory: Clear to auscultation bilaterally but with diminished air exchange, no wheeze, good inspiratory effort  Gastrointestinal: Abdomen soft, diffuse suprapubic tenderness, not distended, normal bowel sounds  Musculoskeletal: No cyanosis in digits, neck supple  Neurology: Cranial nerves grossly intact. Alert and oriented in time, place and person. No speech or motor deficits  Psychiatry: Appropriate affect.  Not agitated  Skin: Warm, dry, normal turgor, no rash  Brisk capillary refill, peripheral pulses palpable   Labs:  CBC:   Lab Results   Component Value Date    WBC 10.5 2021    RBC 5.55 2021    HGB 16.8 2021    HCT 50.6 2021    MCV 91.1 2021    MCH 30.3 2021    MCHC 33.2 2021    RDW 14.1 2021     2021    MPV 7.5 2021     BMP:    Lab Results   Component Value Date     2021    K 3.5 2021    K 3.8 2020    CL 99 2021    CO2 25 2021    BUN 19 2021    CREATININE 0.9 2021    CALCIUM 9.9 2021    GFRAA >60 2021    GFRAA >60 2012    LABGLOM >60 2021    GLUCOSE 109 2021     CT CHEST PULMONARY EMBOLISM W CONTRAST   Final Result   1. No pulmonary embolus. 2. No acute cardiopulmonary disease. 3. Enlarged and heterogeneous thyroid gland for which dedicated thyroid   ultrasound can be obtained on a nonemergent basis.          XR CHEST PORTABLE   Final Result   No significant findings in the chest.           Chest Xray:   EK2021  2:58 PM - Surendra, Tjh Incoming Muse Results    Component Value Ref Range & Units Status Collected Lab   Ventricular Rate 195  BPM Final 2021  1:58 PM 14   Atrial Rate 192  BPM Final 2021  1:58 PM 14   QRS Duration 96  ms Final 2021  1:58 PM 14   Q-T Interval 258  ms Final 2021  1:58 PM 14   QTc Calculation (Bazett) 464  ms Final 2021  1:58 PM 14   R Axis -28  degrees Final 2021  1:58 PM 14   T [place] 151  degrees Final 2021  1:58 PM 14   Diagnosis   Final 2021  1:58 PM 14   Supraventricular tachycardiaLeft ventricular hypertrophy with repolarization abnormalityAbnormal ECGWhen compared with ECG of 2020      Ventricular Rate 99  BPM Preliminary 2021  3:13 PM 14   Atrial Rate 99  BPM Preliminary 2021  3:13 PM 14   P-R Interval 214  ms Preliminary 2021  3:13 PM 14   QRS Duration 106  ms Preliminary 02/02/2021  3:13 PM 14   Q-T Interval 374  ms Preliminary 02/02/2021  3:13 PM 14   QTc Calculation (Bazett) 479  ms Preliminary 02/02/2021  3:13 PM 14   P Axis 93  degrees Preliminary 02/02/2021  3:13 PM 14   R Axis -39  degrees Preliminary 02/02/2021  3:13 PM 14   T [place] 138  degrees Preliminary 02/02/2021  3:13 PM 14   Diagnosis   Preliminary 02/02/2021  3:13 PM 14   Sinus rhythm with 1st degree A-V blockLeft axis deviationPulmonary disease patternLeft ventricular hypertrophy with repolarization abnormalityAbnormal ECGWhen compared with ECG of 02-FEB-2021 15:04,Nonspecific T wave abnormality no longer evident in Inferior leads       I visualized CXR images and EKG strips     Discussed case  with ED provider Dr. Ramiro Cade    Problem List  Principal Problem:    SVT (supraventricular tachycardia) (HCC)  Active Problems:    UTI (urinary tract infection)    Hypertension    Hyperlipidemia    Impaired fasting blood sugar    Thyromegaly    Gastroesophageal reflux disease without esophagitis    Elevated lactic acid level  Resolved Problems:    * No resolved hospital problems. *        Assessment/Plan:     SVT (initial episode)  Admit to telemetry unit for continuous monitoring  Suspect elevated lactate and BMP levels are due to tachyarrhythmia and will be followed serially  Echo scheduled for a.m.   Consult placed to cardiology for further recommendations  Resume home dose of twice daily beta-blocker  One-time dosage of Lovenox 1 mg/kg therapeutic anticoagulation administered (CHADS2 score =4) pending further cardiology recommendations      UTI  Blood and urine cultures collected  Patient initiated on IV Rocephin 1 g every 24    HTN/HLD  Patient with LVH per EKG therefore question if uncontrolled hypertension  Continue home dosage of beta-blocker and ACE inhibitor; dosage of HCTZ reduced by one half due to clinical dehydration    Impaired fasting glucose  A1c pending  Carb restriction placed on diet  POC glucose checks with as needed medium dose Humalog SSI    Thyromegaly  Stat TSH and free T4 ordered to ensure tachyarrhythmia not due to acute hyperthyroidism  Echo scheduled to further assess abnormalities noted on CT scan        DVT prophylaxisLovenox 40 mg subcu daily  Code statusfull code  Dietcardiac JANEY with carb restriction  IV accessPIV established in ED    Admit as inpatient. I anticipate hospitalization spanning more than two midnights for investigation and treatment of the above medically necessary diagnoses. Please note that some part of this chart was generated using Dragon dictation software. Although every effort was made to ensure the accuracy of this automated transcription, some errors in transcription may have occurred inadvertently. If you may need any clarification, please do not hesitate to contact me through [facility]        Aissatou Patel MD    2/2/2021 4:48 PM

## 2021-02-02 NOTE — ED PROVIDER NOTES
CHIEF COMPLAINT  Tachycardia (Pt was at PCP office and came over to ED for new onset afib evaluation. pt reports \"my stomach felt jumpy\". pt denies SOB, weakness, or CP. 's)      HISTORY OF PRESENT ILLNESS  Debbie López is a 65 y.o. female with a history of hypertension, GERD, dyslipidemia who presents to the ED complaining of tachycardia. Patient reports 2 days of palpitations and sensation of rapid heartbeat. States at times she feels lightheaded. Was seen by her PCP today and found to have heart rate in the 190s for which she was sent to the emergency department. Denies any prior history of tacky arrhythmias. She denies chest pain, dyspnea, diaphoresis, fever, chills, abdominal pain. No other complaints, modifying factors or associated symptoms. I have reviewed the following from the nursing documentation. Past Medical History:   Diagnosis Date    GERD (gastroesophageal reflux disease)     Hyperlipidemia     Hypertension      Past Surgical History:   Procedure Laterality Date    CHOLECYSTECTOMY  09/01    HYSTERECTOMY, TOTAL ABDOMINAL  1999    TONSILLECTOMY  1964    TUBAL LIGATION  1972     Family History   Problem Relation Age of Onset    Diabetes Mother     Heart Disease Mother     High Blood Pressure Mother     High Cholesterol Mother     Vision Loss Mother     Cancer Father      Social History     Socioeconomic History    Marital status:      Spouse name: Not on file    Number of children: Not on file    Years of education: Not on file    Highest education level: Not on file   Occupational History    Not on file   Social Needs    Financial resource strain: Not on file    Food insecurity     Worry: Not on file     Inability: Not on file    Transportation needs     Medical: Not on file     Non-medical: Not on file   Tobacco Use    Smoking status: Never Smoker    Smokeless tobacco: Never Used   Substance and Sexual Activity    Alcohol use: No    Drug use:  No  Sexual activity: Yes     Partners: Male   Lifestyle    Physical activity     Days per week: Not on file     Minutes per session: Not on file    Stress: Not on file   Relationships    Social connections     Talks on phone: Not on file     Gets together: Not on file     Attends Latter day service: Not on file     Active member of club or organization: Not on file     Attends meetings of clubs or organizations: Not on file     Relationship status: Not on file    Intimate partner violence     Fear of current or ex partner: Not on file     Emotionally abused: Not on file     Physically abused: Not on file     Forced sexual activity: Not on file   Other Topics Concern    Not on file   Social History Narrative    Not on file     Current Facility-Administered Medications   Medication Dose Route Frequency Provider Last Rate Last Admin    cefTRIAXone (ROCEPHIN) 1000 mg IVPB in 50 mL D5W minibag  1,000 mg Intravenous Once Maritza Corral  mL/hr at 02/02/21 1741 1,000 mg at 02/02/21 1741    [START ON 2/3/2021] cefTRIAXone (ROCEPHIN) 1000 mg IVPB in 50 mL D5W minibag  1,000 mg Intravenous Q24H Catalino Toribio MD         Current Outpatient Medications   Medication Sig Dispense Refill    lisinopril-hydroCHLOROthiazide (PRINZIDE;ZESTORETIC) 20-12.5 MG per tablet Take 1 tablet by mouth 2 times daily 60 tablet 2    famotidine (PEPCID) 20 MG tablet Take 1 tablet by mouth daily 30 tablet 3    fluticasone (FLONASE) 50 MCG/ACT nasal spray 2 sprays by Each Nostril route daily (Patient not taking: Reported on 2/2/2021) 1 Bottle 5    clotrimazole-betamethasone (LOTRISONE) 1-0.05 % cream Apply topically 2 times daily.  (Patient not taking: Reported on 2/2/2021) 1 Tube 2    atenolol (TENORMIN) 50 MG tablet Take 1 tablet by mouth 2 times daily 180 tablet 1    busPIRone (BUSPAR) 7.5 MG tablet Take 1 tablet by mouth daily as needed (anxiety) (Patient not taking: Reported on 2/2/2021) 30 tablet 1  loratadine (CLARITIN) 10 MG tablet Take 10 mg by mouth daily      lovastatin (MEVACOR) 40 MG tablet Take 1 tablet by mouth daily 90 tablet 1    amLODIPine (NORVASC) 2.5 MG tablet Take 1 tablet by mouth daily (Patient not taking: Reported on 2/2/2021) 30 tablet 5    fluticasone (FLONASE) 50 MCG/ACT nasal spray 2 sprays by Each Nostril route daily 16 g 0    montelukast (SINGULAIR) 10 MG tablet Take 1 tablet by mouth nightly (Patient not taking: Reported on 2/2/2021) 30 tablet 0    clotrimazole-betamethasone (LOTRISONE) 1-0.05 % cream Apply to affected area twice a day as needed (Patient not taking: Reported on 2/2/2021) 45 g 0    Cholecalciferol (VITAMIN D) 2000 UNITS CAPS capsule Take by mouth Indications: otc      aspirin 81 MG tablet Take 81 mg by mouth daily. OTC       No Known Allergies    REVIEW OF SYSTEMS  10 systems reviewed, pertinent positives per HPI otherwise noted to be negative. PHYSICAL EXAM  /71   Pulse 102   Temp 97.9 °F (36.6 °C) (Oral)   Resp 11   Ht 5' 7\" (1.702 m)   Wt 187 lb (84.8 kg)   SpO2 99%   BMI 29.29 kg/m²   GENERAL APPEARANCE: Awake and alert. Cooperative. No acute distress. HEAD: Normocephalic. Atraumatic. EYES: PERRL. EOM's grossly intact. ENT: Mucous membranes are moist.   NECK: Supple, trachea midline. HEART: Tachycardia, rate 190s. Normal S1S2, no rubs, gallops, or murmurs noted  LUNGS: Respirations unlabored. CTAB. Good air exchange. No wheezes, rales, or rhonchi. Speaking comfortably in full sentences. ABDOMEN: Soft. Non-distended. Non-tender. No guarding or rebound. Normal bowel sounds. EXTREMITIES: No peripheral edema. MAEE. No acute deformities. SKIN: Warm and dry. No acute rashes. NEUROLOGICAL: Alert and oriented X 3. CN II-XII intact. No gross facial drooping. Strength 5/5, sensation intact. Normal coordination. PSYCHIATRIC: Normal mood and affect. LABS  I have reviewed all labs for this visit. Leukocyte Esterase, Urine SMALL (A) Negative    Microscopic Examination YES     Urine Type NotGiven    Lactic acid, plasma   Result Value Ref Range    Lactic Acid 3.0 (H) 0.4 - 2.0 mmol/L   D-Dimer, Quantitative   Result Value Ref Range    D-Dimer, Quant 507 (H) 0 - 229 ng/mL DDU   Brain Natriuretic Peptide   Result Value Ref Range    Pro-BNP 1,952 (H) 0 - 124 pg/mL   APTT   Result Value Ref Range    aPTT 31.1 24.2 - 36.2 sec   Protime-INR   Result Value Ref Range    Protime 11.6 10.0 - 13.2 sec    INR 1.00 0.86 - 1.14   Microscopic Urinalysis   Result Value Ref Range    WBC, UA 6-9 (A) 0 - 5 /HPF    RBC, UA 0-2 0 - 4 /HPF    Epithelial Cells, UA 2-5 0 - 5 /HPF    Bacteria, UA 2+ (A) None Seen /HPF   Procalcitonin   Result Value Ref Range    Procalcitonin 0.04 0.00 - 0.15 ng/mL   EKG 12 Lead   Result Value Ref Range    Ventricular Rate 195 BPM    Atrial Rate 192 BPM    QRS Duration 96 ms    Q-T Interval 258 ms    QTc Calculation (Bazett) 464 ms    R Axis -28 degrees    T Axis 151 degrees    Diagnosis       Supraventricular tachycardiaLeft ventricular hypertrophy with repolarization abnormalityAbnormal ECGWhen compared with ECG of 25-JUL-2020 17:59,Premature atrial complexes are no longer PresentConfirmed by ABEL Palacio MD ([id number]) on 2/2/2021 2:57:52 PM   EKG 12 Lead   Result Value Ref Range    Ventricular Rate 99 BPM    Atrial Rate 99 BPM    P-R Interval 218 ms    QRS Duration 108 ms    Q-T Interval 344 ms    QTc Calculation (Bazett) 441 ms    P Axis 42 degrees    R Axis -35 degrees    T Axis 149 degrees    Diagnosis       Sinus rhythm with 1st degree A-V blockLeft axis deviationLeft ventricular hypertrophy with repolarization abnormalityAbnormal ECGWhen compared with ECG of 02-FEB-2021 13:58,VA interval has increasedVent.  rate has decreased BY  96 BPMST no longer depressed in Lateral leadsNonspecific T wave abnormality now evident in Inferior leadsT wave inversion more evident in Anterolateral leads EKG 12 Lead   Result Value Ref Range    Ventricular Rate 99 BPM    Atrial Rate 99 BPM    P-R Interval 214 ms    QRS Duration 106 ms    Q-T Interval 374 ms    QTc Calculation (Bazett) 479 ms    P Axis 93 degrees    R Axis -39 degrees    T Axis 138 degrees    Diagnosis       Sinus rhythm with 1st degree A-V blockLeft axis deviationPulmonary disease patternLeft ventricular hypertrophy with repolarization abnormalityAbnormal ECGWhen compared with ECG of 02-FEB-2021 15:04,Nonspecific T wave abnormality no longer evident in Inferior leads       EKG  The Ekg interpreted by myself  supraventricular tachycardia with a rate of 195  Axis is   Left axis deviation  QTc is  prolonged  Intervals and Durations are unremarkable. No specific ST-T wave changes appreciated. No evidence of acute ischemia. Repeat EKG sinus rhythm, rate 99, left axis deviation, QTC within acceptable limits, no acute ST changes        RADIOLOGY  X-RAYS:  I have reviewed radiologic plain film image(s). ALL OTHER NON-PLAIN FILM IMAGES SUCH AS CT, ULTRASOUND AND MRI HAVE BEEN READ BY THE RADIOLOGIST. CT CHEST PULMONARY EMBOLISM W CONTRAST   Final Result   1. No pulmonary embolus. 2. No acute cardiopulmonary disease. 3. Enlarged and heterogeneous thyroid gland for which dedicated thyroid   ultrasound can be obtained on a nonemergent basis. XR CHEST PORTABLE   Final Result   No significant findings in the chest.                    Rechecks: Physical assessment performed. Appears comfortable and nontoxic on reevaluation. Critical Care: 35min Management of acute tacky arrhythmia in the 190s, SVT versus atrial fibrillation with RVR. Frequent reevaluation, review of labs and imaging, discussion with admitting physician. ED COURSE/MDM  Patient seen and evaluated. Old records reviewed. Labs and imaging reviewed and results discussed with patient.

## 2021-02-02 NOTE — ED NOTES
6mg administration of Adenosine was unsuccessful. See new orders.       Letitia Peterson RN  02/02/21 [id number]

## 2021-02-03 ENCOUNTER — APPOINTMENT (OUTPATIENT)
Dept: ULTRASOUND IMAGING | Age: 71
DRG: 309 | End: 2021-02-03
Payer: COMMERCIAL

## 2021-02-03 LAB
A/G RATIO: 2.1 (ref 1.1–2.2)
ALBUMIN SERPL-MCNC: 3.7 G/DL (ref 3.4–5)
ALP BLD-CCNC: 52 U/L (ref 40–129)
ALT SERPL-CCNC: 15 U/L (ref 10–40)
ANION GAP SERPL CALCULATED.3IONS-SCNC: 7 MMOL/L (ref 3–16)
AST SERPL-CCNC: 13 U/L (ref 15–37)
BILIRUB SERPL-MCNC: 0.3 MG/DL (ref 0–1)
BUN BLDV-MCNC: 17 MG/DL (ref 7–20)
CALCIUM SERPL-MCNC: 8.8 MG/DL (ref 8.3–10.6)
CHLORIDE BLD-SCNC: 110 MMOL/L (ref 99–110)
CO2: 24 MMOL/L (ref 21–32)
CREAT SERPL-MCNC: 0.8 MG/DL (ref 0.6–1.2)
EKG ATRIAL RATE: 103 BPM
EKG ATRIAL RATE: 99 BPM
EKG ATRIAL RATE: 99 BPM
EKG DIAGNOSIS: NORMAL
EKG P AXIS: -26 DEGREES
EKG P AXIS: 42 DEGREES
EKG P AXIS: 93 DEGREES
EKG P-R INTERVAL: 208 MS
EKG P-R INTERVAL: 214 MS
EKG P-R INTERVAL: 218 MS
EKG Q-T INTERVAL: 314 MS
EKG Q-T INTERVAL: 344 MS
EKG Q-T INTERVAL: 374 MS
EKG QRS DURATION: 102 MS
EKG QRS DURATION: 106 MS
EKG QRS DURATION: 108 MS
EKG QTC CALCULATION (BAZETT): 411 MS
EKG QTC CALCULATION (BAZETT): 441 MS
EKG QTC CALCULATION (BAZETT): 479 MS
EKG R AXIS: -35 DEGREES
EKG R AXIS: -39 DEGREES
EKG R AXIS: -41 DEGREES
EKG T AXIS: 138 DEGREES
EKG T AXIS: 149 DEGREES
EKG T AXIS: 173 DEGREES
EKG VENTRICULAR RATE: 103 BPM
EKG VENTRICULAR RATE: 99 BPM
EKG VENTRICULAR RATE: 99 BPM
ESTIMATED AVERAGE GLUCOSE: 119.8 MG/DL
GFR AFRICAN AMERICAN: >60
GFR NON-AFRICAN AMERICAN: >60
GLOBULIN: 1.8 G/DL
GLUCOSE BLD-MCNC: 105 MG/DL (ref 70–99)
GLUCOSE BLD-MCNC: 106 MG/DL (ref 70–99)
GLUCOSE BLD-MCNC: 114 MG/DL (ref 70–99)
HBA1C MFR BLD: 5.8 %
LACTIC ACID: 1 MMOL/L (ref 0.4–2)
LACTIC ACID: 1.3 MMOL/L (ref 0.4–2)
LACTIC ACID: 1.5 MMOL/L (ref 0.4–2)
LACTIC ACID: 1.8 MMOL/L (ref 0.4–2)
MAGNESIUM: 1.9 MG/DL (ref 1.8–2.4)
PERFORMED ON: ABNORMAL
PERFORMED ON: ABNORMAL
POTASSIUM REFLEX MAGNESIUM: 3.3 MMOL/L (ref 3.5–5.1)
PRO-BNP: 2677 PG/ML (ref 0–124)
SODIUM BLD-SCNC: 141 MMOL/L (ref 136–145)
T4 FREE: 1.1 NG/DL (ref 0.9–1.8)
T4 FREE: 1.1 NG/DL (ref 0.9–1.8)
TOTAL PROTEIN: 5.5 G/DL (ref 6.4–8.2)
TROPONIN: <0.01 NG/ML
TSH REFLEX FT4: 9.07 UIU/ML (ref 0.27–4.2)
TSH SERPL DL<=0.05 MIU/L-ACNC: 4.31 UIU/ML (ref 0.27–4.2)

## 2021-02-03 PROCEDURE — 83735 ASSAY OF MAGNESIUM: CPT

## 2021-02-03 PROCEDURE — 6370000000 HC RX 637 (ALT 250 FOR IP): Performed by: HOSPITALIST

## 2021-02-03 PROCEDURE — 93005 ELECTROCARDIOGRAM TRACING: CPT | Performed by: HOSPITALIST

## 2021-02-03 PROCEDURE — 80053 COMPREHEN METABOLIC PANEL: CPT

## 2021-02-03 PROCEDURE — 6360000002 HC RX W HCPCS: Performed by: HOSPITALIST

## 2021-02-03 PROCEDURE — 36415 COLL VENOUS BLD VENIPUNCTURE: CPT

## 2021-02-03 PROCEDURE — 93010 ELECTROCARDIOGRAM REPORT: CPT | Performed by: INTERNAL MEDICINE

## 2021-02-03 PROCEDURE — 83880 ASSAY OF NATRIURETIC PEPTIDE: CPT

## 2021-02-03 PROCEDURE — 76536 US EXAM OF HEAD AND NECK: CPT

## 2021-02-03 PROCEDURE — 1200000000 HC SEMI PRIVATE

## 2021-02-03 PROCEDURE — 2580000003 HC RX 258: Performed by: HOSPITALIST

## 2021-02-03 PROCEDURE — 83605 ASSAY OF LACTIC ACID: CPT

## 2021-02-03 PROCEDURE — 99223 1ST HOSP IP/OBS HIGH 75: CPT | Performed by: INTERNAL MEDICINE

## 2021-02-03 PROCEDURE — 84484 ASSAY OF TROPONIN QUANT: CPT

## 2021-02-03 RX ADMIN — ATORVASTATIN CALCIUM 10 MG: 10 TABLET, FILM COATED ORAL at 09:43

## 2021-02-03 RX ADMIN — HYDROCHLOROTHIAZIDE 12.5 MG: 12.5 CAPSULE ORAL at 09:42

## 2021-02-03 RX ADMIN — ATENOLOL 50 MG: 25 TABLET ORAL at 21:58

## 2021-02-03 RX ADMIN — ASPIRIN 81 MG: 81 TABLET, COATED ORAL at 09:43

## 2021-02-03 RX ADMIN — LISINOPRIL 20 MG: 20 TABLET ORAL at 09:43

## 2021-02-03 RX ADMIN — ATENOLOL 50 MG: 25 TABLET ORAL at 09:43

## 2021-02-03 RX ADMIN — FAMOTIDINE 20 MG: 20 TABLET ORAL at 09:43

## 2021-02-03 RX ADMIN — LISINOPRIL 20 MG: 20 TABLET ORAL at 21:59

## 2021-02-03 RX ADMIN — CETIRIZINE HYDROCHLORIDE 5 MG: 5 TABLET, FILM COATED ORAL at 09:43

## 2021-02-03 RX ADMIN — FLUTICASONE PROPIONATE 2 SPRAY: 50 SPRAY, METERED NASAL at 09:49

## 2021-02-03 RX ADMIN — POTASSIUM CHLORIDE 40 MEQ: 1500 TABLET, EXTENDED RELEASE ORAL at 10:25

## 2021-02-03 RX ADMIN — CEFTRIAXONE SODIUM 1000 MG: 1 INJECTION, POWDER, FOR SOLUTION INTRAMUSCULAR; INTRAVENOUS at 18:30

## 2021-02-03 NOTE — PROGRESS NOTES
Hospitalist Progress Note      PCP: Ruthie Mcgrath MD    Date of Admission: 2/2/2021    Chief Complaint: Palpitation, weakness    Hospital Course: Admitted with supraventricular tachycardia. Empirically started on antibiotics for UTI. Feels better today. Seen by cardiac electrophysiology. OVIDIO with cardioversion planned. Subjective: No chest pain, no shortness of breath, no nausea or vomiting      Medications:  Reviewed    Infusion Medications    dextrose       Scheduled Medications    famotidine  20 mg Oral Daily    fluticasone  2 spray Each Nostril Daily    cetirizine  5 mg Oral Daily    atorvastatin  10 mg Oral Daily    atenolol  50 mg Oral BID    aspirin  81 mg Oral Daily    lisinopril  20 mg Oral BID    hydroCHLOROthiazide  12.5 mg Oral Daily    cefTRIAXone (ROCEPHIN) IV  1,000 mg Intravenous Q24H     PRN Meds: glucose, dextrose, glucagon (rDNA), dextrose, potassium chloride **OR** potassium alternative oral replacement **OR** potassium chloride, magnesium sulfate, promethazine **OR** ondansetron, senna, acetaminophen **OR** acetaminophen, perflutren lipid microspheres      Intake/Output Summary (Last 24 hours) at 2/3/2021 1441  Last data filed at 2/3/2021 0950  Gross per 24 hour   Intake 1705 ml   Output 625 ml   Net 1080 ml       Physical Exam Performed:    /83   Pulse 105   Temp 98.8 °F (37.1 °C) (Oral)   Resp 18   Ht 5' 7\" (1.702 m)   Wt 192 lb 1.6 oz (87.1 kg)   SpO2 95%   BMI 30.09 kg/m²     General appearance: No apparent distress, appears stated age and cooperative. HEENT: Pupils equal, round, and reactive to light. Conjunctivae/corneas clear. Neck: Supple, with full range of motion. No jugular venous distention. Trachea midline. Respiratory:  Normal respiratory effort. Clear to auscultation, bilaterally without Rales/Wheezes/Rhonchi. Cardiovascular: Regular and tachycardic rate with normal S1/S2 without murmurs, rubs or gallops.   Abdomen: Soft, non-tender, non-distended with normal bowel sounds. Musculoskeletal: No clubbing, cyanosis or edema bilaterally. Full range of motion without deformity. Skin: Skin color, texture, turgor normal.  No rashes or lesions. Neurologic:  Neurovascularly intact without any focal sensory/motor deficits. Cranial nerves: II-XII intact, grossly non-focal.  Psychiatric: Alert and oriented, thought content appropriate, normal insight  Capillary Refill: Brisk,< 3 seconds   Peripheral Pulses: +2 palpable, equal bilaterally       Labs:   Recent Labs     02/02/21  1402   WBC 10.5   HGB 16.8*   HCT 50.6*        Recent Labs     02/02/21  1402 02/03/21  0426    141   K 3.5 3.3*   CL 99 110   CO2 25 24   BUN 19 17   CREATININE 0.9 0.8   CALCIUM 9.9 8.8     Recent Labs     02/02/21  1402 02/03/21  0426   AST 18 13*   ALT 18 15   BILITOT 0.5 0.3   ALKPHOS 66 52     Recent Labs     02/02/21  1407   INR 1.00     Recent Labs     02/03/21  0200 02/03/21  0426 02/03/21  0749   TROPONINI <0.01 <0.01 <0.01       Urinalysis:      Lab Results   Component Value Date    NITRU POSITIVE 02/02/2021    WBCUA 6-9 02/02/2021    BACTERIA 2+ 02/02/2021    RBCUA 0-2 02/02/2021    BLOODU Negative 02/02/2021    SPECGRAV 1.010 02/02/2021    GLUCOSEU Negative 02/02/2021       Radiology:  US THYROID   Final Result   1. Bilateral TR 3 nodules measuring 15 mm and 38 mm. Recommend FNA of the   left-sided lesion. 2. Sequela of chronic thyroiditis.    ACR TI-RADS recommendations:      TR5 (>= 7 points):  FNA if >= 1 cm; follow-up if 0.5-0.9 cm in 1, 2, 3, 4,   and 5 years      TR4 (4-6 points):  FNA if >= 1.5 cm; follow-up if 1.0-1.4 cm in 1, 2, 3, and   5 years      TR3 (3 points):  FNA if >= 2.5 cm; follow-up if 1.5-2.4 cm in 1, 3, and 5   years      TR2 (2 points):  No FNA or follow-up      TR1 (0 points):  No FNA or follow-up      ACR TI-RADS recommends that no more than two nodules with the highest ACR   TI-RADS point total should be biopsied and no more than four nodules should   be followed. CT CHEST PULMONARY EMBOLISM W CONTRAST   Final Result   1. No pulmonary embolus. 2. No acute cardiopulmonary disease. 3. Enlarged and heterogeneous thyroid gland for which dedicated thyroid   ultrasound can be obtained on a nonemergent basis. XR CHEST PORTABLE   Final Result   No significant findings in the chest.                 Assessment/Plan:    Active Hospital Problems    Diagnosis    SVT (supraventricular tachycardia) (HCC) [I47.1]    Thyromegaly [E01.0]    UTI (urinary tract infection) [N39.0]    Elevated lactic acid level [R79.89]    Gastroesophageal reflux disease without esophagitis [K21.9]    Impaired fasting blood sugar [R73.01]    Hypertension [I10]    Hyperlipidemia [E78.5]     PLAN:    Atrial tachycardia   Seen by cardiac electrophysiology  OVIDIO with cardioversion planned, followed by 1 month anticoagulation. Was in atrial tachycardia, before OVIDIO, when I saw the patient. Echo ordered. UTI  Continue empiric ceftriaxone  I do not see an ordered urine culture. We will proceed with empiric cephalosporins. Dyslipidemia  Continue statin. Thyroid nodule  Ultrasound results noted. At least one nodule will require fine-needle aspiration biopsy. Will be referred to PCP as outpatient, as post cardioversion anticoagulation will not be compatible with thyroid biopsy. We will need to wait at least 1 month. Hyperglycemia  Borderline. No need for insulin. Hemoglobin A1c is pending. Discussed with nursing. Update appreciated. Discussed with the patient, questions answered    DVT Prophylaxis: Lovenox  Diet: DIET CARDIAC; Carb Control: 4 carb choices (60 gms)/meal; No Added Salt (3-4 GM)  Code Status: Full Code    PT/OT Eval Status: Not needed.   Ambulatory    Dispo -inpatient stay, probably 1 more day    Danica Stokes MD

## 2021-02-03 NOTE — PROGRESS NOTES
Bedside report given to [facility]. Call light and bedside table within reach. No needs voiced at this time. Bed in lowest position, brakes locked. Bed alarm on and in place.

## 2021-02-03 NOTE — CONSULTS
Electrophysiology Consultation   Date: 2/3/2021  Admit Date:  2/2/2021  Reason for Consultation: Supraventricular tachycardia  Consult Requesting Physician: Mumtaz Post MD     Chief Complaint   Patient presents with    Tachycardia     Pt was at PCP office and came over to ED for new onset afib evaluation. pt reports \"my stomach felt jumpy\". pt denies SOB, weakness, or CP. 's     HPI:   Mrs. Sabas Barker is a pleasant 65year old female with a medical history significant for hypertension, hyperlipidemia and thyromegaly who presents from home with suspected urinary tract infection and tachycardia. According to patient over the last few weeks she has been suffering from urinary urgency, dysuria and foul smelling urine. She is hesitant to seek medical attention and so didn't seek medical advise until she found she was tachycardic on Sunday. She called her PCP and was seen by primary provider. During her evaluation patient was found to be tachycardic with rates in the 170s. She was directed to the ER for evaluation because of concern for atrial fibrillation. Patient denies fevers, chest pain, orthopnea, PND, lower extremity edema, abdominal swelling, shortness of breath, dyspnea on exertion, chills, visual changes, headaches, sore throat, cough, abdominal pain, nausea, vomiting, bleeding, bruising, muscle/joint pain, confusion, depression, anxiety, skin lesions, etc.    Emergency Room/Hospital Course:  Patient was evaluated in the emergency room on 02/02/2021. She was found to be tachycardic. She was given a dose of adenosine however tachycardia didn't improve. She was given some digoxin and her her rates lowered to the 100s from 190s per report (no strips to review how occurred). Her TSH was slightly elevated and then elevated (two different results, appears more normal results were repeat) however her FT4 was within normal limits. Her troponin enzymes were negative multiple times.   Her BNP was slightly elevated. Her lactic acid was slightly elevated. Her CBC was reassuring. CTPE was notable for enlarged thyroid gland (may be worked up as outpatient). Past Medical History:   Diagnosis Date    GERD (gastroesophageal reflux disease)     Hyperlipidemia     Hypertension         Past Surgical History:   Procedure Laterality Date    CHOLECYSTECTOMY  09/01    HYSTERECTOMY, TOTAL ABDOMINAL  1999    TONSILLECTOMY  1964    TUBAL LIGATION  1972       No Known Allergies    Social History:  Reviewed. reports that she has never smoked. She has never used smokeless tobacco. She reports that she does not drink alcohol or use drugs. Family History:  Reviewed. family history includes Cancer in her father; Diabetes in her mother; Heart Disease in her mother; High Blood Pressure in her mother; High Cholesterol in her mother; Vision Loss in her mother. No premature CAD. Review of System:  All other systems reviewed except for that noted above. Pertinent negatives and positives are:     · General: negative for fever, chills   · Ophthalmic ROS: negative for - eye pain or loss of vision  · ENT ROS: negative for - headaches, sore throat   · Respiratory: negative for - cough, sputum  · Cardiovascular: Reviewed in HPI  · Gastrointestinal: negative for - abdominal pain, diarrhea, N/V  · Hematology: negative for - bleeding, blood clots, bruising or jaundice  · Genito-Urinary:  As per above  · Musculoskeletal: negative for - Joint swelling, muscle pain  · Neurological: negative for - confusion, dizziness, headaches   · Psychiatric: No anxiety, no depression.   · Dermatological: negative for - rash    Physical Examination:  Vitals:    02/03/21 0802   BP: (!) 148/83   Pulse: 106   Resp: 16   Temp: 97.4 °F (36.3 °C)   SpO2: 96%        Intake/Output Summary (Last 24 hours) at 2/3/2021 0924  Last data filed at 2/3/2021 0410  Gross per 24 hour   Intake 1240 ml   Output 625 ml   Net 615 ml     In: 1240 [P.O.:240; I.V.:1000]  Out: 625    Wt Readings from Last 3 Encounters:   21 192 lb 1.6 oz (87.1 kg)   21 192 lb 9.6 oz (87.4 kg)   20 193 lb (87.5 kg)     Temp  Av.7 °F (36.5 °C)  Min: 97.4 °F (36.3 °C)  Max: 98 °F (36.7 °C)  Pulse  Av.1  Min: 98  Max: 193  BP  Min: 84/68  Max: 148/83  SpO2  Av.4 %  Min: 93 %  Max: 99 %    · Telemetry: Sinus tachycardia  · Constitutional: Alert. Oriented to person, place, and time. No distress. · Head: Normocephalic and atraumatic. · Neck: Neck supple. No lymphadenopathy. No rigidity. No JVD present. · Cardiovascular: Mildly tachycardic, regular rhythm. Normal S1&S2. · Pulmonary/Chest: Bilateral respiratory sounds present. No respiratory accessory muscle use. · Abdominal: Soft. Normal bowel sounds present. No distension, No tenderness. · Musculoskeletal: No tenderness. No edema    · Neurological: Alert and oriented. Cranial nerve II-XII grossly intact. · Skin: Skin is warm and dry. No rash, lesions, ulcerations noted. · Psychiatric: No anxiety nor agitation. Labs:  Reviewed. Recent Labs     21  1402 21  0426    141   K 3.5 3.3*   CL 99 110   CO2 25 24   BUN 19 17   CREATININE 0.9 0.8     Recent Labs     21  1402   WBC 10.5   HGB 16.8*   HCT 50.6*   MCV 91.1        Lab Results   Component Value Date    TROPONINI <0.01 2021     No results found for: BNP  Lab Results   Component Value Date    PROTIME 11.6 2021    INR 1.00 2021     Lab Results   Component Value Date    CHOL 168 2020    HDL 37 2020    HDL 39 2012    TRIG 183 2020       Diagnostic and imaging results reviewed. ECG: Supraventricular tachycardia. No discernable P wave. Subsequent EKGs show AT with 2:1 conduction. Echo: Pending. I independently reviewed the ECG and telemetry.     Scheduled Meds:   famotidine  20 mg Oral Daily    fluticasone  2 spray Each Nostril Daily    cetirizine  5 mg Oral Daily  atorvastatin  10 mg Oral Daily    atenolol  50 mg Oral BID    aspirin  81 mg Oral Daily    lisinopril  20 mg Oral BID    hydroCHLOROthiazide  12.5 mg Oral Daily    insulin lispro  0-12 Units Subcutaneous TID     insulin lispro  0-6 Units Subcutaneous Nightly    cefTRIAXone (ROCEPHIN) IV  1,000 mg Intravenous Q24H     Continuous Infusions:   dextrose       PRN Meds:.glucose, dextrose, glucagon (rDNA), dextrose, potassium chloride **OR** potassium alternative oral replacement **OR** potassium chloride, magnesium sulfate, promethazine **OR** ondansetron, senna, acetaminophen **OR** acetaminophen, perflutren lipid microspheres     Assessment:   Patient Active Problem List    Diagnosis Date Noted    SVT (supraventricular tachycardia) ([facility] [address] 02/02/2021    Thyromegaly 02/02/2021    UTI (urinary tract infection) 02/02/2021    Elevated lactic acid level 02/02/2021    Positive FIT (fecal immunochemical test) 02/02/2018    Gastroesophageal reflux disease without esophagitis 12/03/2017    Impaired fasting blood sugar 10/11/2016    Hypertension 10/18/2011    Hyperlipidemia 10/18/2011      Active Hospital Problems    Diagnosis Date Noted    SVT [facility]ra[facility]ricular tachycardia) ([facility]) [I47.1] 02/02/2021    Thyromegaly [E01.0] 02/02/2021    UTI (urinary tract infection) [N39.0] 02/02/2021    Elevated lactic acid level [R79.89] 02/02/2021    Gastroesophageal reflux disease without esophagitis [K21.9] 12/03/2017    Impaired fasting blood sugar [R73.01] 10/11/2016    Hypertension [I10] 10/18/2011    Hyperlipidemia [E78.5] 10/18/2011       Mrs. Beyer is a pleasant 65year old female with a medical history significant for hypertension, hyperlipidemia and thyromegaly who presents from home with suspected urinary tract infection and tachycardia. Problem List:  1. Atrial tachycardia. 2. Urinary tract infection. 3. Hypertension. Assessment and Plan:  1. Atrial tachycardia.   Patient is a pleasant 25-year-old female with medical history significant for hypertension, hyperlipidemia, and thyromegaly who presents from home with urinary tract infection and supraventricular tachycardia. Analysis of her presenting EKG is suggestive of supraventricular tachycardia of unknown etiology given no discernible P waves. Repeat EKGs and EKGs from primary provider suggests atrial tachycardia with 2-1 conduction (variable conduction at PCPs office). There is a discrete baseline between P waves consistent with atrial tachycardia. Atrial tachycardia usually is focal and therefore may be triggered >> microrentry >> macrorenetry >> enhanced automaticity. Management options include ablation, rate control, and antiarrhythmic therapy. While there is a chance that atrial tachycardia may result in atrial fibrillation however per guidelines there is no anticoagulation required for atrial tachycardia (PubMed: [id number]). Discussed options with patient. I will ask patient to consider OVIDIO cardioversion with at least 1 month of anticoagulation and cardiac monitor.  - Continue atenolol.  - Plan for OVIDIO cardioversion with anticoagulation.  - Four week monitor at time of discharge. - Follow up with me in clinic. 2. Urinary tract infection. Suspected urinary tract infection.   - Per primary team.    3. Hypertension.  - Per primary team.    4. Thyromegaly. - Per primary team.    Thank you for allowing me to participate in the care of Brooks Morris . If you have any questions/comments, please do not hesitate to contact us.     Sherri Zaragoza MD  Cardiac Electrophysiology  [address] [facility]  [phone] [facility]

## 2021-02-04 ENCOUNTER — TELEPHONE (OUTPATIENT)
Dept: CARDIOLOGY | Age: 71
End: 2021-02-04

## 2021-02-04 ENCOUNTER — ANESTHESIA (OUTPATIENT)
Dept: CARDIAC CATH/INVASIVE PROCEDURES | Age: 71
DRG: 309 | End: 2021-02-04
Payer: COMMERCIAL

## 2021-02-04 ENCOUNTER — APPOINTMENT (OUTPATIENT)
Dept: CARDIAC CATH/INVASIVE PROCEDURES | Age: 71
DRG: 309 | End: 2021-02-04
Payer: COMMERCIAL

## 2021-02-04 ENCOUNTER — ANESTHESIA EVENT (OUTPATIENT)
Dept: CARDIAC CATH/INVASIVE PROCEDURES | Age: 71
DRG: 309 | End: 2021-02-04
Payer: COMMERCIAL

## 2021-02-04 VITALS
RESPIRATION RATE: 18 BRPM | BODY MASS INDEX: 30.06 KG/M2 | HEIGHT: 67 IN | SYSTOLIC BLOOD PRESSURE: 104 MMHG | HEART RATE: 58 BPM | WEIGHT: 191.5 LBS | OXYGEN SATURATION: 96 % | DIASTOLIC BLOOD PRESSURE: 66 MMHG | TEMPERATURE: 97.6 F

## 2021-02-04 LAB
EKG ATRIAL RATE: 108 BPM
EKG ATRIAL RATE: 56 BPM
EKG DIAGNOSIS: NORMAL
EKG DIAGNOSIS: NORMAL
EKG P AXIS: 78 DEGREES
EKG P AXIS: 9 DEGREES
EKG P-R INTERVAL: 194 MS
EKG P-R INTERVAL: 212 MS
EKG Q-T INTERVAL: 330 MS
EKG Q-T INTERVAL: 454 MS
EKG QRS DURATION: 104 MS
EKG QRS DURATION: 98 MS
EKG QTC CALCULATION (BAZETT): 438 MS
EKG QTC CALCULATION (BAZETT): 442 MS
EKG R AXIS: -35 DEGREES
EKG R AXIS: -40 DEGREES
EKG T AXIS: -1 DEGREES
EKG T AXIS: 161 DEGREES
EKG VENTRICULAR RATE: 108 BPM
EKG VENTRICULAR RATE: 56 BPM
LV EF: 53 %
LVEF MODALITY: NORMAL

## 2021-02-04 PROCEDURE — 5A2204Z RESTORATION OF CARDIAC RHYTHM, SINGLE: ICD-10-PCS | Performed by: INTERNAL MEDICINE

## 2021-02-04 PROCEDURE — 6370000000 HC RX 637 (ALT 250 FOR IP): Performed by: INTERNAL MEDICINE

## 2021-02-04 PROCEDURE — 93312 ECHO TRANSESOPHAGEAL: CPT

## 2021-02-04 PROCEDURE — 2500000003 HC RX 250 WO HCPCS

## 2021-02-04 PROCEDURE — 93325 DOPPLER ECHO COLOR FLOW MAPG: CPT

## 2021-02-04 PROCEDURE — 6360000002 HC RX W HCPCS: Performed by: NURSE ANESTHETIST, CERTIFIED REGISTERED

## 2021-02-04 PROCEDURE — 6370000000 HC RX 637 (ALT 250 FOR IP): Performed by: HOSPITALIST

## 2021-02-04 PROCEDURE — 92960 CARDIOVERSION ELECTRIC EXT: CPT

## 2021-02-04 PROCEDURE — 3700000000 HC ANESTHESIA ATTENDED CARE

## 2021-02-04 PROCEDURE — 99232 SBSQ HOSP IP/OBS MODERATE 35: CPT | Performed by: INTERNAL MEDICINE

## 2021-02-04 PROCEDURE — 2500000003 HC RX 250 WO HCPCS: Performed by: NURSE ANESTHETIST, CERTIFIED REGISTERED

## 2021-02-04 PROCEDURE — 3700000001 HC ADD 15 MINUTES (ANESTHESIA)

## 2021-02-04 PROCEDURE — 93320 DOPPLER ECHO COMPLETE: CPT

## 2021-02-04 PROCEDURE — 93005 ELECTROCARDIOGRAM TRACING: CPT | Performed by: INTERNAL MEDICINE

## 2021-02-04 PROCEDURE — 92960 CARDIOVERSION ELECTRIC EXT: CPT | Performed by: INTERNAL MEDICINE

## 2021-02-04 PROCEDURE — B24BZZ4 ULTRASONOGRAPHY OF HEART WITH AORTA, TRANSESOPHAGEAL: ICD-10-PCS | Performed by: INTERNAL MEDICINE

## 2021-02-04 PROCEDURE — 93010 ELECTROCARDIOGRAM REPORT: CPT | Performed by: INTERNAL MEDICINE

## 2021-02-04 RX ORDER — MORPHINE SULFATE 4 MG/ML
2 INJECTION, SOLUTION INTRAMUSCULAR; INTRAVENOUS EVERY 5 MIN PRN
Status: DISCONTINUED | OUTPATIENT
Start: 2021-02-04 | End: 2021-02-04 | Stop reason: HOSPADM

## 2021-02-04 RX ORDER — SODIUM CHLORIDE, SODIUM LACTATE, POTASSIUM CHLORIDE, CALCIUM CHLORIDE 600; 310; 30; 20 MG/100ML; MG/100ML; MG/100ML; MG/100ML
INJECTION, SOLUTION INTRAVENOUS CONTINUOUS
Status: DISCONTINUED | OUTPATIENT
Start: 2021-02-04 | End: 2021-02-04 | Stop reason: HOSPADM

## 2021-02-04 RX ORDER — SODIUM CHLORIDE 0.9 % (FLUSH) 0.9 %
10 SYRINGE (ML) INJECTION PRN
Status: DISCONTINUED | OUTPATIENT
Start: 2021-02-04 | End: 2021-02-04 | Stop reason: HOSPADM

## 2021-02-04 RX ORDER — GREEN TEA/HOODIA GORDONII 315-12.5MG
1 CAPSULE ORAL DAILY
Qty: 30 TABLET | Refills: 0 | Status: SHIPPED | OUTPATIENT
Start: 2021-02-04 | End: 2021-03-06

## 2021-02-04 RX ORDER — CEFUROXIME AXETIL 250 MG/1
250 TABLET ORAL 2 TIMES DAILY
Qty: 6 TABLET | Refills: 0 | Status: SHIPPED | OUTPATIENT
Start: 2021-02-04 | End: 2021-02-07

## 2021-02-04 RX ORDER — SODIUM CHLORIDE 0.9 % (FLUSH) 0.9 %
10 SYRINGE (ML) INJECTION EVERY 12 HOURS SCHEDULED
Status: DISCONTINUED | OUTPATIENT
Start: 2021-02-04 | End: 2021-02-04 | Stop reason: HOSPADM

## 2021-02-04 RX ORDER — LIDOCAINE HYDROCHLORIDE 20 MG/ML
INJECTION, SOLUTION EPIDURAL; INFILTRATION; INTRACAUDAL; PERINEURAL PRN
Status: DISCONTINUED | OUTPATIENT
Start: 2021-02-04 | End: 2021-02-04 | Stop reason: SDUPTHER

## 2021-02-04 RX ORDER — MORPHINE SULFATE 4 MG/ML
1 INJECTION, SOLUTION INTRAMUSCULAR; INTRAVENOUS EVERY 5 MIN PRN
Status: DISCONTINUED | OUTPATIENT
Start: 2021-02-04 | End: 2021-02-04 | Stop reason: HOSPADM

## 2021-02-04 RX ORDER — OXYCODONE HYDROCHLORIDE AND ACETAMINOPHEN 5; 325 MG/1; MG/1
1 TABLET ORAL PRN
Status: DISCONTINUED | OUTPATIENT
Start: 2021-02-04 | End: 2021-02-04 | Stop reason: HOSPADM

## 2021-02-04 RX ORDER — CETIRIZINE HYDROCHLORIDE 10 MG/1
5 TABLET ORAL DAILY
Qty: 30 TABLET | Refills: 1 | Status: SHIPPED | OUTPATIENT
Start: 2021-02-05 | End: 2021-06-01 | Stop reason: SDUPTHER

## 2021-02-04 RX ORDER — OXYCODONE HYDROCHLORIDE AND ACETAMINOPHEN 5; 325 MG/1; MG/1
2 TABLET ORAL PRN
Status: DISCONTINUED | OUTPATIENT
Start: 2021-02-04 | End: 2021-02-04 | Stop reason: HOSPADM

## 2021-02-04 RX ORDER — MEPERIDINE HYDROCHLORIDE 50 MG/ML
12.5 INJECTION INTRAMUSCULAR; INTRAVENOUS; SUBCUTANEOUS EVERY 5 MIN PRN
Status: DISCONTINUED | OUTPATIENT
Start: 2021-02-04 | End: 2021-02-04 | Stop reason: HOSPADM

## 2021-02-04 RX ORDER — ONDANSETRON 2 MG/ML
4 INJECTION INTRAMUSCULAR; INTRAVENOUS
Status: DISCONTINUED | OUTPATIENT
Start: 2021-02-04 | End: 2021-02-04 | Stop reason: HOSPADM

## 2021-02-04 RX ORDER — PROPOFOL 10 MG/ML
INJECTION, EMULSION INTRAVENOUS PRN
Status: DISCONTINUED | OUTPATIENT
Start: 2021-02-04 | End: 2021-02-04 | Stop reason: SDUPTHER

## 2021-02-04 RX ADMIN — SODIUM CHLORIDE, SODIUM LACTATE, POTASSIUM CHLORIDE, CALCIUM CHLORIDE: 600; 310; 30; 20 INJECTION, SOLUTION INTRAVENOUS at 14:04

## 2021-02-04 RX ADMIN — LIDOCAINE HYDROCHLORIDE 60 MG: 20 INJECTION, SOLUTION EPIDURAL; INFILTRATION; INTRACAUDAL; PERINEURAL at 14:11

## 2021-02-04 RX ADMIN — ATORVASTATIN CALCIUM 10 MG: 10 TABLET, FILM COATED ORAL at 09:08

## 2021-02-04 RX ADMIN — PROPOFOL 50 MG: 10 INJECTION, EMULSION INTRAVENOUS at 15:09

## 2021-02-04 RX ADMIN — FAMOTIDINE 20 MG: 20 TABLET ORAL at 09:09

## 2021-02-04 RX ADMIN — LISINOPRIL 20 MG: 20 TABLET ORAL at 09:09

## 2021-02-04 RX ADMIN — APIXABAN 5 MG: 5 TABLET, FILM COATED ORAL at 17:44

## 2021-02-04 RX ADMIN — FLUTICASONE PROPIONATE 2 SPRAY: 50 SPRAY, METERED NASAL at 09:09

## 2021-02-04 RX ADMIN — PROPOFOL 100 MG: 10 INJECTION, EMULSION INTRAVENOUS at 15:03

## 2021-02-04 RX ADMIN — ASPIRIN 81 MG: 81 TABLET, COATED ORAL at 09:09

## 2021-02-04 RX ADMIN — LIDOCAINE HYDROCHLORIDE 60 MG: 20 INJECTION, SOLUTION EPIDURAL; INFILTRATION; INTRACAUDAL; PERINEURAL at 15:02

## 2021-02-04 RX ADMIN — ATENOLOL 50 MG: 25 TABLET ORAL at 09:08

## 2021-02-04 RX ADMIN — CETIRIZINE HYDROCHLORIDE 5 MG: 5 TABLET, FILM COATED ORAL at 09:08

## 2021-02-04 ASSESSMENT — ENCOUNTER SYMPTOMS: SHORTNESS OF BREATH: 0

## 2021-02-04 ASSESSMENT — LIFESTYLE VARIABLES: SMOKING_STATUS: 0

## 2021-02-04 NOTE — PROCEDURES
[address]     Electrophysiology Procedure Note       Date of Procedure: 2/4/2021  Patient's Name: Brenda Dickinson  YOB: 1950   Medical Record Number: [id number]  Referring Physician: Binnie Hodgkin, MD  Procedure Performed by: Katrin Reyes MD    Procedures performed:  · Anesthesia: Monitored Anesthesia Care  · Level of sedation plan: Moderate sedation (conscious sedation) per anesthesia  · Trans-esophageal echocardiography  · External Electrical cardioversion     Indication of the procedure: Symptomatic atrial tachycardia    Details of procedure: The patient was brought to the cath lab area in a fasting and non-sedated state. The risks, benefits and alternatives of the procedure were discussed with the patient. The patient opted to proceed with the procedure. Written informed consent was signed and placed in the chart. A timeout protocol was completed to identify the patient and the procedure being performed. IV sedation was provided by anesthesia services. Full OVIDIO reports will be dictated. Patient will be started on chronic anticoagulation therapy. The patient was monitored continuously with ECG, pulse oximetry, blood pressure monitoring, and direct observation. Electrical DC cardioversion was performed using 200J, synchronized shock. Patient was converted to sinus rhythm. Specimen collected: none    Estimated blood loss: none    The patient tolerated the procedure well and there were no complications. Conclusion:   Successful external DC cardioversion of symptomatic atrial tachycardia. Plan:   The patient can be discharged if remains stable. Will start with apixaban 5 mg BID. The patient will follow-up with Dr. Alexia Newton as an outpatient to discuss other therapeutic options for atrial tachycardia. Thank you for allowing me to participate in the care of this patient. If you have any questions, please feel free to contact me.     Katrin Reyes MD  Cardiac Electrophysiology [address] [facility]  [phone] [facility]

## 2021-02-04 NOTE — TELEPHONE ENCOUNTER
Monitor company [employer]  Length of monitor 14 days  Monitor ordered by Lynne Allen MD, electrophysiologist   Code: [phone] Monitor ID: [id number]      Nurse will activate at the time of discharge.

## 2021-02-04 NOTE — ANESTHESIA PRE PROCEDURE
Department of Anesthesiology  Preprocedure Note       Name:  Ant Koroma   Age:  65 y.o.  :  1950                                          MRN:  [id number]         Date:  2021      Surgeon: * Surgery not found *    Procedure:     Medications prior to admission:   Prior to Admission medications    Medication Sig Start Date End Date Taking? Authorizing Provider   lisinopril-hydroCHLOROthiazide (PRINZIDE;ZESTORETIC) 20-12.5 MG per tablet Take 1 tablet by mouth 2 times daily 20 Yes Clemente Emery MD   famotidine (PEPCID) 20 MG tablet Take 1 tablet by mouth daily 20 Yes Clemente Emery MD   atenolol (TENORMIN) 50 MG tablet Take 1 tablet by mouth 2 times daily 20  Yes Clemente Emery MD   loratadine (CLARITIN) 10 MG tablet Take 10 mg by mouth daily   Yes Historical Provider, MD   lovastatin (MEVACOR) 40 MG tablet Take 1 tablet by mouth daily 20  Yes Clemente Emery MD   fluticasone [facility]) 50 MCG/ACT nasal spray 2 sprays by Each Nostril route daily 20  Yes RADHA Ybarra - CNP   Cholecalciferol (VITAMIN D) 2000 UNITS CAPS capsule Take by mouth Indications: otc   Yes Historical Provider, MD   aspirin 81 MG tablet Take 81 mg by mouth daily. OTC   Yes Historical Provider, MD   fluticasone (FLONASE) 50 MCG/ACT nasal spray 2 sprays by Each Nostril route daily  Patient not taking: Reported on 2021   Clemente Emery MD   clotrimazole-betamethasone (LOTRISONE) 1-0.05 % cream Apply topically 2 times daily.   Patient not taking: Reported on 2021   Clemente Emery MD   busPIRone (BUSPAR) 7.5 MG tablet Take 1 tablet by mouth daily as needed (anxiety)  Patient not taking: Reported on 2021   Clemente Emery MD   amLODIPine (NORVASC) 2.5 MG tablet Take 1 tablet by mouth daily  Patient not taking: Reported on 2021   MD rohith Dupree (SINGULAIR) 10 MG tablet Take 1 tablet by mouth nightly Patient not taking: Reported on 2/2/2021 4/8/20   Sharla Blandon APRN - CNP   clotrimazole-betamethasone (Anish Amsler) 1-0.05 % cream Apply to affected area twice a day as needed  Patient not taking: Reported on 2/2/2021 4/1/20   Riky Lozoya MD       Current medications:    Current Facility-Administered Medications   Medication Dose Route Frequency Provider Last Rate Last Admin    famotidine (PEPCID) tablet 20 mg  20 mg Oral Daily Artesundaya MD Manju   20 mg at 02/04/21 3823    fluticasone (FLONASE) 50 MCG/ACT nasal spray 2 spray  2 spray Each Nostril Daily Osmina MD Manju   2 spray at 02/04/21 8731    cetirizine (ZYRTEC) tablet 5 mg  5 mg Oral Daily Bob Nunes MD   5 mg at 02/04/21 0908    atorvastatin (LIPITOR) tablet 10 mg  10 mg Oral Daily Osmina MD Manju   10 mg at 02/04/21 0908    atenolol (TENORMIN) tablet 50 mg  50 mg Oral BID Bob Nunes MD   50 mg at 02/04/21 0908    aspirin EC tablet 81 mg  81 mg Oral Daily Artesundaya MD Manju   81 mg at 02/04/21 1534    lisinopril (PRINIVIL;ZESTRIL) tablet 20 mg  20 mg Oral BID Bob Nunes MD   20 mg at 02/04/21 6532    hydroCHLOROthiazide (MICROZIDE) capsule 12.5 mg  12.5 mg Oral Daily Bob Nunes MD   Stopped at 02/04/21 0913    glucose (GLUTOSE) 40 % oral gel 15 g  15 g Oral PRN Artelia MD Manju        dextrose 50 % IV solution  12.5 g Intravenous PRN Artesundaya MD Manju        glucagon (rDNA) injection 1 mg  1 mg Intramuscular PRN Artesundaya MD Manju        dextrose 5 % solution  100 mL/hr Intravenous PRN Artelia MD Manju        potassium chloride (KLOR-CON M) extended release tablet 40 mEq  40 mEq Oral PRN Artelia MD Manju   40 mEq at 02/03/21 1025    Or    potassium bicarb-citric acid (EFFER-K) effervescent tablet 40 mEq  40 mEq Oral PRN Artelia Gutting, MD        Or    potassium chloride 10 mEq/100 mL IVPB (Peripheral Line)  10 mEq Intravenous PRN Artelia MD Manju  magnesium sulfate 2000 mg in 50 mL IVPB premix  2,000 mg Intravenous PRN Genevieve [facility] MD        promethazine (PHENERGAN) tablet 12.5 mg  12.5 mg Oral Q6H PRN Genevieve [facility] MD        Or    ondansetron [facility] injection 4 mg  4 mg Intravenous Q6H PRN Genevieve [facility] MD        senna (SENOKOT) tablet 8.6 mg  1 tablet Oral Daily PRN Genevieve [facility] MD        acetaminophen (TYLENOL) tablet 650 mg  650 mg Oral Q6H PRN Genevieve [facility] MD        Or   Niharika Nemeaghan acetaminophen (TYLENOL) suppository 650 mg  650 mg Rectal Q6H PRN Genevieve [facility] MD        perflutren lipid microspheres (DEFINITY) injection 1.65 mg  1.5 mL Intravenous ONCE PRN Genevieve [facility] MD        cefTRIAXone (ROCEPHIN) 1000 mg IVPB in 50 mL D5W minibag  1,000 mg Intravenous Q24H Genevieve [facility] MD   Stopped at 02/03/21 1919       Allergies:  No Known Allergies    Problem List:    Patient Active Problem List   Diagnosis Code    Hypertension I10    Hyperlipidemia E78.5    Impaired fasting blood sugar R73.01    Gastroesophageal reflux disease without esophagitis K21.9    Positive FIT (fecal immunochemical test) R19.5    SVT (supraventricular tachycardia) (HCC) I47.1    Thyromegaly E01.0    UTI (urinary tract infection) N39.0    Elevated lactic acid level R79.89       Past Medical History:        Diagnosis Date    GERD (gastroesophageal reflux disease)     Hyperlipidemia     Hypertension        Past Surgical History:        Procedure Laterality Date    CHOLECYSTECTOMY  09/01    HYSTERECTOMY, TOTAL ABDOMINAL  1999    TONSILLECTOMY  1964    TUBAL LIGATION  1972       Social History:    Social History     Tobacco Use    Smoking status: Never Smoker    Smokeless tobacco: Never Used   Substance Use Topics    Alcohol use:  No                                Counseling given: Not Answered      Vital Signs (Current):   Vitals:    02/04/21 0021 02/04/21 0448 02/04/21 0858 02/04/21 1234   BP: 98/60 102/70 114/72 (!) 119/57 Pulse: 112 106 110 108   Resp: 18 18 18 18   Temp: 97.6 °F (36.4 °C) 97.7 °F (36.5 °C) 97.5 °F (36.4 °C) 97.6 °F (36.4 °C)   TempSrc: Oral Axillary Axillary Oral   SpO2: 94% 96% 98% 96%   Weight:  191 lb 8 oz (86.9 kg)     Height:                                                  BP Readings from Last 3 Encounters:   02/04/21 (!) 119/57   02/02/21 136/86   12/08/20 130/78       NPO Status:  mn+, see mar                                                                               BMI:   Wt Readings from Last 3 Encounters:   02/04/21 191 lb 8 oz (86.9 kg)   02/02/21 192 lb 9.6 oz (87.4 kg)   12/08/20 193 lb (87.5 kg)     Body mass index is 29.99 kg/m².     CBC:   Lab Results   Component Value Date    WBC 10.5 02/02/2021    RBC 5.55 02/02/2021    HGB 16.8 02/02/2021    HCT 50.6 02/02/2021    MCV 91.1 02/02/2021    RDW 14.1 02/02/2021     02/02/2021       CMP:   Lab Results   Component Value Date     02/03/2021    K 3.3 02/03/2021     02/03/2021    CO2 24 02/03/2021    BUN 17 02/03/2021    CREATININE 0.8 02/03/2021    GFRAA >60 02/03/2021    GFRAA >60 04/13/2012    AGRATIO 2.1 02/03/2021    LABGLOM >60 02/03/2021    GLUCOSE 106 02/03/2021    PROT 5.5 02/03/2021    PROT 6.5 04/13/2012    CALCIUM 8.8 02/03/2021    BILITOT 0.3 02/03/2021    ALKPHOS 52 02/03/2021    AST 13 02/03/2021    ALT 15 02/03/2021       POC Tests:   Recent Labs     02/03/21  1209   POCGLU 114*       Coags:   Lab Results   Component Value Date    PROTIME 11.6 02/02/2021    INR 1.00 02/02/2021    APTT 31.1 02/02/2021       HCG (If Applicable): No results found for: PREGTESTUR, PREGSERUM, HCG, HCGQUANT     ABGs: No results found for: PHART, PO2ART, NRJ7STU, QCW1AWY, BEART, T5NQGFKU     Type & Screen (If Applicable):  No results found for: LABABO, LABRH    Drug/Infectious Status (If Applicable):  No results found for: HIV, HEPCAB    COVID-19 Screening (If Applicable): No results found for: COVID19      Anesthesia Evaluation Patient summary reviewed no history of anesthetic complications:   Airway: Mallampati: III  TM distance: <3 FB   Neck ROM: full  Mouth opening: < 3 FB Dental:          Pulmonary:Negative Pulmonary ROS breath sounds clear to auscultation      (-) COPD, asthma, shortness of breath, recent URI, sleep apnea and not a current smoker          Patient did not smoke on day of surgery. Cardiovascular:    (+) hypertension:, dysrhythmias: atrial fibrillation and SVT,     (-) pacemaker, valvular problems/murmurs, past MI, CAD, CABG/stent,  angina and  CHF    ECG reviewed  Rhythm: irregular  Rate: abnormal           Beta Blocker:  Dose within 24 Hrs         Neuro/Psych:   Negative Neuro/Psych ROS     (-) seizures, TIA, CVA and psychiatric history           GI/Hepatic/Renal:   (+) GERD: well controlled,      (-) liver disease, no renal disease, bowel prep and no morbid obesity       Endo/Other:    (+) hyperthyroidism (enlarged, no meds): arthritis:., no malignancy/cancer. (-) diabetes mellitus, hypothyroidism, blood dyscrasia, no malignancy/cancer               Abdominal:   (+) obese,     Abdomen: soft. Vascular: negative vascular ROS. Anesthesia Plan      TIVA     ASA 3       Induction: intravenous. MIPS: Prophylactic antiemetics administered. Anesthetic plan and risks discussed with patient. Plan discussed with CRNA. This pre-anesthesia assessment may be used as a history and physical.    DOS STAFF ADDENDUM:    Pt seen and examined, chart reviewed (including anesthesia, drug and allergy history). No interval changes to history and physical examination. Anesthetic plan, risks, benefits, alternatives, and personnel involved discussed with patient. Patient verbalized an understanding and agrees to proceed.       Haylie Pagan MD  February 4, 2021  2:10 PM      Haylie Pagna MD   2/4/2021

## 2021-02-04 NOTE — TELEPHONE ENCOUNTER
Reagan Sandoval the patient is being discharged today and needs a 6 week appt with ABDIAZIZK per MAX. Thank you.

## 2021-02-04 NOTE — PROGRESS NOTES
Monitor company [employer]  Length of monitor 14 days  Monitor ordered by Julio César Lepe MD, electrophysiologist   Code: [id number] Monitor ID: [id number]      Nurse will activate at the time of discharge.

## 2021-02-04 NOTE — DISCHARGE SUMMARY
Hospital Medicine Discharge Summary    Patient ID: Brenna Spangler      Patient's PCP: Jose Cruz Yip MD    Admit Date: 2/2/2021     Discharge Date:   02/04/21     Admitting Physician: Isreal Brown MD     Discharge Physician: Myrna Curran MD     Discharge Diagnoses: Active Hospital Problems    Diagnosis    SVT [facility]supraventricular tachycardia) ([facility]) [I47.1]    Thyromegaly [E01.0]    UTI (urinary tract infection) [N39.0]    Elevated lactic acid level [R79.89]    Gastroesophageal reflux disease without esophagitis [K21.9]    Impaired fasting blood sugar [R73.01]    Hypertension [I10]    Hyperlipidemia [E78.5]       The patient was seen and examined on day of discharge and this discharge summary is in conjunction with any daily progress note from day of discharge. Hospital Course:     Patient was admitted with supraventricular tachycardia. Was seen by cardiac EP. Had OVIDIO with DC CV. Resumed sinus rhythm. Will be discharged home on po Eliquis x 1 month  F/u with cardiac EP as OP for chronic treatment options. 2-week cardiac monitor also ordered on discharge    Empirically started on antibiotics for UTI. Will be discharged on three more days of po cefuroxime    Asymptomatic and stable condition at the time of discharge    No loratadine or related for QTc interaction. Can take other OTC antihistamines not related to loratadine. Instructions provided    Hold ASA while on Eliquis      Physical Exam Performed:     /66   Pulse 58   Temp 97.6 °F (36.4 °C) (Oral)   Resp 18   Ht 5' 7\" (1.702 m)   Wt 191 lb 8 oz (86.9 kg)   SpO2 96%   BMI 29.99 kg/m²       General appearance:  No apparent distress, appears stated age and cooperative. HEENT:  Normal cephalic, atraumatic without obvious deformity. Pupils equal, round, and reactive to light. Extra ocular muscles intact. Conjunctivae/corneas clear. Neck: Supple, with full range of motion. No jugular venous distention. Trachea midline. Respiratory:  Normal respiratory effort. Clear to auscultation, bilaterally without Rales/Wheezes/Rhonchi. Cardiovascular:  Regular rate and rhythm with normal S1/S2 without murmurs, rubs or gallops. Abdomen: Soft, non-tender, non-distended with normal bowel sounds. Musculoskeletal:  No clubbing, cyanosis or edema bilaterally. Full range of motion without deformity. Skin: Skin color, texture, turgor normal.  No rashes or lesions. Neurologic:  Neurovascularly intact without any focal sensory/motor deficits. Cranial nerves: II-XII intact, grossly non-focal.  Psychiatric:  Alert and oriented, thought content appropriate, normal insight  Capillary Refill: Brisk,< 3 seconds   Peripheral Pulses: +2 palpable, equal bilaterally       Labs: For convenience and continuity at follow-up the following most recent labs are provided:      CBC:    Lab Results   Component Value Date    WBC 10.5 02/02/2021    HGB 16.8 02/02/2021    HCT 50.6 02/02/2021     02/02/2021       Renal:    Lab Results   Component Value Date     02/03/2021    K 3.3 02/03/2021     02/03/2021    CO2 24 02/03/2021    BUN 17 02/03/2021    CREATININE 0.8 02/03/2021    CALCIUM 8.8 02/03/2021    PHOS 3.8 06/18/2010         Significant Diagnostic Studies    Radiology:   US THYROID   Final Result   1. Bilateral TR 3 nodules measuring 15 mm and 38 mm. Recommend FNA of the   left-sided lesion. 2. Sequela of chronic thyroiditis.    ACR TI-RADS recommendations:      TR5 (>= 7 points):  FNA if >= 1 cm; follow-up if 0.5-0.9 cm in 1, 2, 3, 4,   and 5 years      TR4 (4-6 points):  FNA if >= 1.5 cm; follow-up if 1.0-1.4 cm in 1, 2, 3, and   5 years      TR3 (3 points):  FNA if >= 2.5 cm; follow-up if 1.5-2.4 cm in 1, 3, and 5   years      TR2 (2 points):  No FNA or follow-up      TR1 (0 points):  No FNA or follow-up      ACR TI-RADS recommends that no more than two nodules with the highest ACR Associated Diagnoses: Seasonal allergic rhinitis, unspecified trigger      Cholecalciferol (VITAMIN D) 2000 UNITS CAPS capsule Take by mouth Indications: otc      busPIRone (BUSPAR) 7.5 MG tablet Take 1 tablet by mouth daily as needed (anxiety)  Qty: 30 tablet, Refills: 1      montelukast (SINGULAIR) 10 MG tablet Take 1 tablet by mouth nightly  Qty: 30 tablet, Refills: 0    Associated Diagnoses: Seasonal allergic rhinitis, unspecified trigger             Time Spent on discharge is more than 45 minutes in the examination, evaluation, counseling and review of medications and discharge plan. Signed:    Kristine Galaviz MD   2/4/2021      Thank you Herbert Preston MD for the opportunity to be involved in this patient's care. If you have any questions or concerns please feel free to contact me at [phone].

## 2021-02-04 NOTE — PROGRESS NOTES
Labs     02/02/21  1402   WBC 10.5   HGB 16.8*   HCT 50.6*   MCV 91.1        Lab Results   Component Value Date    TROPONINI <0.01 02/03/2021     Estimated Creatinine Clearance: 72 mL/min (based on SCr of 0.8 mg/dL).    No results found for: BNP  Lab Results   Component Value Date    PROTIME 11.6 02/02/2021    INR 1.00 02/02/2021     Lab Results   Component Value Date    CHOL 168 12/08/2020    HDL 37 12/08/2020    HDL 39 04/13/2012    TRIG 183 12/08/2020       Scheduled Meds:   sodium chloride flush  10 mL Intravenous 2 times per day    apixaban  5 mg Oral BID    famotidine  20 mg Oral Daily    fluticasone  2 spray Each Nostril Daily    cetirizine  5 mg Oral Daily    atorvastatin  10 mg Oral Daily    atenolol  50 mg Oral BID    aspirin  81 mg Oral Daily    lisinopril  20 mg Oral BID    hydroCHLOROthiazide  12.5 mg Oral Daily    cefTRIAXone (ROCEPHIN) IV  1,000 mg Intravenous Q24H     Continuous Infusions:   lactated ringers      dextrose       PRN Meds:sodium chloride flush, meperidine, HYDROmorphone, HYDROmorphone, morphine, morphine, oxyCODONE-acetaminophen **OR** oxyCODONE-acetaminophen, ondansetron, glucose, dextrose, glucagon (rDNA), dextrose, potassium chloride **OR** potassium alternative oral replacement **OR** potassium chloride, magnesium sulfate, promethazine **OR** ondansetron, senna, acetaminophen **OR** acetaminophen, perflutren lipid microspheres     Patient Active Problem List    Diagnosis Date Noted    SVT (supraventricular tachycardia) ([facility] 02/02/2021    Thyromegaly 02/02/2021    UTI (urinary tract infection) 02/02/2021    Elevated lactic acid level 02/02/2021    Positive FIT (fecal immunochemical test) 02/02/2018    Gastroesophageal reflux disease without esophagitis 12/03/2017    Impaired fasting blood sugar 10/11/2016    Hypertension 10/18/2011    Hyperlipidemia 10/18/2011      Active Hospital Problems    Diagnosis Date Noted    SVT (supraventricular tachycardia) ([facility] [I47.1] 02/02/2021    Thyromegaly [E01.0] 02/02/2021    UTI (urinary tract infection) [N39.0] 02/02/2021    Elevated lactic acid level [R79.89] 02/02/2021    Gastroesophageal reflux disease without esophagitis [K21.9] 12/03/2017    Impaired fasting blood sugar [R73.01] 10/11/2016    Hypertension [I10] 10/18/2011    Hyperlipidemia [E78.5] 10/18/2011       Mrs. Annabella Vernon is a pleasant 65year old female with a medical history significant for hypertension, hyperlipidemia and thyromegaly who presents from home with suspected urinary tract infection and tachycardia.       Problem List:  1. Atrial tachycardia. 2. Urinary tract infection. 3. Hypertension.     Assessment and Plan:  1. Atrial tachycardia. 02/03/2021  Patient is a pleasant 53-year-old female with medical history significant for hypertension, hyperlipidemia, and thyromegaly who presents from home with urinary tract infection and supraventricular tachycardia. Analysis of her presenting EKG is suggestive of supraventricular tachycardia of unknown etiology given no discernible P waves. Repeat EKGs and EKGs from primary provider suggests atrial tachycardia with 2-1 conduction (variable conduction at PCPs office). There is a discrete baseline between P waves consistent with atrial tachycardia.  Atrial tachycardia usually is focal and therefore may be triggered >> microrentry >> macrorenetry >> enhanced automaticity.  Management options include ablation, rate control, and antiarrhythmic therapy.  While there is a chance that atrial tachycardia may result in atrial fibrillation however per guidelines there is no anticoagulation required for atrial tachycardia (PubMed: [id number]).  Discussed options with patient. Liu Will will ask patient to consider OVIDIO cardioversion with at least 1 month of anticoagulation and cardiac monitor. 02/04/2021  Patient to have OVIDIO cardioversion. Will start apixaban shortly before or after. I will order Two week monitor.   Ok to discharge after OVIDIO cardioversion.  - Continue atenolol.  - Continue apixaban 5 mg BID for 4 weeks. - Two week monitor at time of discharge. - Follow up with me in clinic.     2. Urinary tract infection. Suspected urinary tract infection.   - Per primary team.     3. Hypertension.  - Per primary team.     4. Thyromegaly. - Per primary team.    Thank you for allowing me to participate in the care of this patient. If you have any questions, please do not hesitate to contact me.     Lino Garcia MD  Cardiac Electrophysiology  [facility]  [phone] [facility]

## 2021-02-04 NOTE — ANESTHESIA POSTPROCEDURE EVALUATION
Department of Anesthesiology  Postprocedure Note    Patient: Jose Stephenson  MRN: [id number]  YOB: 1950  Date of evaluation: 2/4/2021  Time:  3:58 PM     Procedure Summary     Date: 02/04/21 Room / Location: [facility] Cath Lab    Anesthesia Start: 9904 Anesthesia Stop: 1536    Procedure: CARDIOVERSION WITH A OVIDIO Diagnosis:     Scheduled Providers:  Responsible Provider: Trey Jasso MD    Anesthesia Type: TIVA ASA Status: 3          Anesthesia Type: TIVA    Delfino Phase I:      Delfino Phase II:      Last vitals: Reviewed and per EMR flowsheets.    Vitals:    02/04/21 0448 02/04/21 0858 02/04/21 1234 02/04/21 1557   BP: 102/70 114/72 (!) 119/57 104/66   Pulse: 106 110 108 58   Resp: 18 18 18 18   Temp: 97.7 °F (36.5 °C) 97.5 °F (36.4 °C) 97.6 °F (36.4 °C) 97.6 °F (36.4 °C)   TempSrc: Axillary Axillary Oral Oral   SpO2: 96% 98% 96% 96%   Weight: 191 lb 8 oz (86.9 kg)      Height:           Anesthesia Post Evaluation    Patient location during evaluation: bedside  Patient participation: complete - patient participated  Level of consciousness: awake and alert  Airway patency: patent  Nausea & Vomiting: no nausea  Complications: no  Cardiovascular status: hemodynamically stable  Respiratory status: acceptable  Hydration status: euvolemic

## 2021-02-04 NOTE — PROGRESS NOTES
Hospitalist Progress Note      PCP: Ruthie Mcgraht MD    Date of Admission: 2/2/2021    Chief Complaint: Palpitation, weakness    Hospital Course: Admitted with supraventricular tachycardia. Empirically started on antibiotics for UTI. Feels better today. Seen by cardiac electrophysiology. OVIDIO with cardioversion planned. Scheduled for today. Subjective: No chest pain, no shortness of breath, no nausea or vomiting. No new issues past 24 hours      Medications:  Reviewed    Infusion Medications    dextrose       Scheduled Medications    famotidine  20 mg Oral Daily    fluticasone  2 spray Each Nostril Daily    cetirizine  5 mg Oral Daily    atorvastatin  10 mg Oral Daily    atenolol  50 mg Oral BID    aspirin  81 mg Oral Daily    lisinopril  20 mg Oral BID    hydroCHLOROthiazide  12.5 mg Oral Daily    cefTRIAXone (ROCEPHIN) IV  1,000 mg Intravenous Q24H     PRN Meds: glucose, dextrose, glucagon (rDNA), dextrose, potassium chloride **OR** potassium alternative oral replacement **OR** potassium chloride, magnesium sulfate, promethazine **OR** ondansetron, senna, acetaminophen **OR** acetaminophen, perflutren lipid microspheres      Intake/Output Summary (Last 24 hours) at 2/4/2021 0937  Last data filed at 2/4/2021 0911  Gross per 24 hour   Intake 1185 ml   Output    Net 1185 ml       Physical Exam Performed:    /72   Pulse 110   Temp 97.5 °F (36.4 °C) (Axillary)   Resp 18   Ht 5' 7\" (1.702 m)   Wt 191 lb 8 oz (86.9 kg)   SpO2 98%   BMI 29.99 kg/m²     General appearance: No apparent distress, appears stated age and cooperative. HEENT: Pupils equal, round, and reactive to light. Conjunctivae/corneas clear. Neck: Supple, with full range of motion. No jugular venous distention. Trachea midline. Respiratory:  Normal respiratory effort. Clear to auscultation, bilaterally without Rales/Wheezes/Rhonchi.   Cardiovascular: Regular and tachycardic rate with normal S1/S2 without murmurs, rubs or gallops. Abdomen: Soft, non-tender, non-distended with normal bowel sounds. Musculoskeletal: No clubbing, cyanosis or edema bilaterally. Full range of motion without deformity. Skin: Skin color, texture, turgor normal.  No rashes or lesions. Neurologic:  Neurovascularly intact without any focal sensory/motor deficits. Cranial nerves: II-XII intact, grossly non-focal.  Psychiatric: Alert and oriented, thought content appropriate, normal insight  Capillary Refill: Brisk,< 3 seconds   Peripheral Pulses: +2 palpable, equal bilaterally     I examined the patient today (02/04/21). Physical exam is similar to yesterday (2/3). Labs:   Recent Labs     02/02/21  1402   WBC 10.5   HGB 16.8*   HCT 50.6*        Recent Labs     02/02/21  1402 02/03/21  0426    141   K 3.5 3.3*   CL 99 110   CO2 25 24   BUN 19 17   CREATININE 0.9 0.8   CALCIUM 9.9 8.8     Recent Labs     02/02/21  1402 02/03/21  0426   AST 18 13*   ALT 18 15   BILITOT 0.5 0.3   ALKPHOS 66 52     Recent Labs     02/02/21  1407   INR 1.00     Recent Labs     02/03/21  0200 02/03/21  0426 02/03/21  0749   TROPONINI <0.01 <0.01 <0.01       Urinalysis:      Lab Results   Component Value Date    NITRU POSITIVE 02/02/2021    WBCUA 6-9 02/02/2021    BACTERIA 2+ 02/02/2021    RBCUA 0-2 02/02/2021    BLOODU Negative 02/02/2021    SPECGRAV 1.010 02/02/2021    GLUCOSEU Negative 02/02/2021       Radiology:  US THYROID   Final Result   1. Bilateral TR 3 nodules measuring 15 mm and 38 mm. Recommend FNA of the   left-sided lesion. 2. Sequela of chronic thyroiditis.    ACR TI-RADS recommendations:      TR5 (>= 7 points):  FNA if >= 1 cm; follow-up if 0.5-0.9 cm in 1, 2, 3, 4,   and 5 years      TR4 (4-6 points):  FNA if >= 1.5 cm; follow-up if 1.0-1.4 cm in 1, 2, 3, and   5 years      TR3 (3 points):  FNA if >= 2.5 cm; follow-up if 1.5-2.4 cm in 1, 3, and 5   years      TR2 (2 points):  No FNA or follow-up      TR1 (0 points):  No FNA or follow-up pending OVIDIO with cardioversion.     Mattie Orr MD

## 2021-02-05 ENCOUNTER — CARE COORDINATION (OUTPATIENT)
Dept: CASE MANAGEMENT | Age: 71
End: 2021-02-05

## 2021-02-05 DIAGNOSIS — I10 ESSENTIAL HYPERTENSION: Primary | ICD-10-CM

## 2021-02-05 PROCEDURE — 1111F DSCHRG MED/CURRENT MED MERGE: CPT | Performed by: INTERNAL MEDICINE

## 2021-02-05 NOTE — CARE COORDINATION
as-needed medications. Covid Risk Education    Patient has following risk factors of: HTN . Education provided regarding infection prevention, and signs and symptoms of COVID-19 and when to seek medical attention with patient who verbalized understanding. Discussed exposure protocols and quarantine From CDC: Are you at higher risk for severe illness?   and given an opportunity for questions and concerns. The patient agrees to contact the COVID-19 hotline [phone] or PCP office for questions related to COVID-19. For more information on steps you can take to protect yourself, see CDC's How to Protect Yourself         Discussed follow-up appointments. If no appointment was previously scheduled, appointment scheduling offered: No. Is follow up appointment scheduled within 7 days of discharge? No  Non-[facility] follow up appointment(s):     Plan for follow-up call in 5-7 days based on severity of symptoms and risk factors. Spoke with patient who reported she is doing fine. Patient denied any CP or SOB. Patient reported she has all her medications and taking as prescribed. CTN reviewed all medications with patient. Patient has apt with cardiologist on 3/18. Patient reported she has heart monitor in place. Denies any acute needs at present time. Agreeable to f/u calls. Educated on the use of urgent care or physicians 24 hr access line if assistance is needed after hours. CTN provided contact information for future needs.           Care Transitions 24 Hour Call    Do you have any ongoing symptoms?: No  Do you have a copy of your discharge instructions?: Yes  Do you have all of your prescriptions and are they filled?: Yes  Have you been contacted by a [facility] Pharmacist?: No  Have you scheduled your follow up appointment?: Yes  How are you going to get to your appointment?: Car - family or friend to transport  Were you discharged with any Home Care or Post Acute Services: No  Do you feel like you have everything you need to keep you well at home?: Yes  Care Transitions Interventions         Follow Up  Future Appointments   Date Time Provider Praveen Wilkerson   3/10/2021  9:00 BRIANNE/ Katie Ramirez, MD EMERITA SLOAN   3/18/2021  9:15 AM MD Percy Logan Ala, RN

## 2021-02-06 LAB
BLOOD CULTURE, ROUTINE: NORMAL
CULTURE, BLOOD 2: NORMAL

## 2021-02-11 ENCOUNTER — CARE COORDINATION (OUTPATIENT)
Dept: CASE MANAGEMENT | Age: 71
End: 2021-02-11

## 2021-02-22 PROCEDURE — 93272 ECG/REVIEW INTERPRET ONLY: CPT | Performed by: INTERNAL MEDICINE

## 2021-02-23 ENCOUNTER — TELEPHONE (OUTPATIENT)
Dept: CARDIOLOGY CLINIC | Age: 71
End: 2021-02-23

## 2021-02-23 NOTE — TELEPHONE ENCOUNTER
Informed patient of cardiac event monitor results. V/u. Patient has appointment scheduled with [facility],[address] 3/2021.

## 2021-02-24 DIAGNOSIS — I47.1 SVT (SUPRAVENTRICULAR TACHYCARDIA) (HCC): ICD-10-CM

## 2021-03-04 PROBLEM — N39.0 UTI (URINARY TRACT INFECTION): Status: RESOLVED | Noted: 2021-02-02 | Resolved: 2021-03-04

## 2021-03-10 ENCOUNTER — OFFICE VISIT (OUTPATIENT)
Dept: FAMILY MEDICINE CLINIC | Age: 71
End: 2021-03-10
Payer: COMMERCIAL

## 2021-03-10 ENCOUNTER — TELEPHONE (OUTPATIENT)
Dept: FAMILY MEDICINE CLINIC | Age: 71
End: 2021-03-10

## 2021-03-10 VITALS
BODY MASS INDEX: 30.13 KG/M2 | HEIGHT: 67 IN | OXYGEN SATURATION: 99 % | DIASTOLIC BLOOD PRESSURE: 80 MMHG | HEART RATE: 56 BPM | WEIGHT: 192 LBS | TEMPERATURE: 96.9 F | SYSTOLIC BLOOD PRESSURE: 126 MMHG

## 2021-03-10 DIAGNOSIS — I10 ESSENTIAL HYPERTENSION: Primary | ICD-10-CM

## 2021-03-10 DIAGNOSIS — E78.00 PURE HYPERCHOLESTEROLEMIA: ICD-10-CM

## 2021-03-10 DIAGNOSIS — Z12.31 ENCOUNTER FOR SCREENING MAMMOGRAM FOR BREAST CANCER: ICD-10-CM

## 2021-03-10 DIAGNOSIS — Z12.11 ENCOUNTER FOR FIT (FECAL IMMUNOCHEMICAL TEST) SCREENING: ICD-10-CM

## 2021-03-10 DIAGNOSIS — E04.1 THYROID NODULE: ICD-10-CM

## 2021-03-10 PROCEDURE — 10021 FNA BX W/O IMG GDN 1ST LES: CPT | Performed by: INTERNAL MEDICINE

## 2021-03-10 PROCEDURE — 99214 OFFICE O/P EST MOD 30 MIN: CPT | Performed by: INTERNAL MEDICINE

## 2021-03-10 RX ORDER — ATENOLOL 50 MG/1
50 TABLET ORAL 2 TIMES DAILY
Qty: 180 TABLET | Refills: 3 | Status: SHIPPED | OUTPATIENT
Start: 2021-03-10 | End: 2022-02-07

## 2021-03-10 RX ORDER — LOVASTATIN 40 MG/1
40 TABLET ORAL DAILY
Qty: 90 TABLET | Refills: 3 | Status: SHIPPED | OUTPATIENT
Start: 2021-03-10 | End: 2021-06-01 | Stop reason: SDUPTHER

## 2021-03-10 NOTE — TELEPHONE ENCOUNTER
A needle aspiration procedure needs to be ordered by MD and the patient will need to call [phone] to schedule the procedure.

## 2021-03-12 ENCOUNTER — TELEPHONE (OUTPATIENT)
Dept: FAMILY MEDICINE CLINIC | Age: 71
End: 2021-03-12

## 2021-03-12 NOTE — TELEPHONE ENCOUNTER
I was calling [address] [place] [address] radiology regarding your order for the patient thyroid- only to see that your order had already been completed earlier today.

## 2021-03-14 ASSESSMENT — ENCOUNTER SYMPTOMS
SORE THROAT: 0
COUGH: 0

## 2021-03-15 NOTE — PROGRESS NOTES
21    Rodrigo Matias (: 1950) is a 65 y.o. female, here for evaluation of the following medical concerns:  TN, HLD, thyroid nodule    HPI; Treatment Adherence:   Medication compliance:  compliant most of the time  Diet compliance:  compliant most of the time  Weight trend: stable  Current exercise: no regular exercise  Barriers: none    Hypertension:  Home blood pressure monitoring: Yes - . Patient denies chest pain and shortness of breath. Antihypertensive medication side effects: no medication side effects noted. Use of agents associated with hypertension: none. Sodium (mmol/L)   Date Value   2021 141    BUN (mg/dL)   Date Value   2021 17    Glucose (mg/dL)   Date Value   2021 106 (H)      Potassium reflex Magnesium (mmol/L)   Date Value   2021 3.3 (L)    CREATININE (mg/dL)   Date Value   2021 0.8         Hyperlipidemia:  No new myalgias or GI upset on lovastatin (Mevacor). Lab Results   Component Value Date    CHOL 168 2020    TRIG 183 (H) 2020    HDL 37 (L) 2020    LDLCALC 94 2020     Lab Results   Component Value Date    ALT 15 2021    AST 13 (L) 2021        Found to have a thyroid nodule on ultrasound, [facility] nodule radiologist suggest an FNA. Review of Systems   Constitutional: Negative for activity change. HENT: Negative. Negative for sore throat. Respiratory: Negative for cough. Cardiovascular: Negative for chest pain. Gastrointestinal:        Taking pepcid OTC for gerd   Musculoskeletal: Negative for neck pain. Neurological: Negative for dizziness and headaches. Speech difficulty:    Hematological: Does not bruise/bleed easily. Psychiatric/Behavioral: Negative.         Current Outpatient Medications   Medication Sig Dispense Refill    lovastatin (MEVACOR) 40 MG tablet Take 1 tablet by mouth daily 90 tablet 3    atenolol (TENORMIN) 50 MG tablet Take 1 tablet by mouth 2 times daily 180 tablet 3    cetirizine (ZYRTEC) 10 MG tablet Take 0.5 tablets by mouth daily 30 tablet 1    fluticasone (FLONASE) 50 MCG/ACT nasal spray 2 sprays by Each Nostril route daily 16 g 0    Cholecalciferol (VITAMIN D) 2000 UNITS CAPS capsule Take by mouth Indications: otc      lisinopril-hydroCHLOROthiazide (PRINZIDE;ZESTORETIC) 20-12.5 MG per tablet Take 1 tablet by mouth 2 times daily 60 tablet 2    famotidine (PEPCID) 20 MG tablet Take 1 tablet by mouth daily 30 tablet 3     No current facility-administered medications for this visit. Social History     Socioeconomic History    Marital status:       Spouse name: Not on file    Number of children: Not on file    Years of education: Not on file    Highest education level: Not on file   Occupational History    Not on file   Social Needs    Financial resource strain: Not on file    Food insecurity     Worry: Not on file     Inability: Not on file    Transportation needs     Medical: Not on file     Non-medical: Not on file   Tobacco Use    Smoking status: Never Smoker    Smokeless tobacco: Never Used   Substance and Sexual Activity    Alcohol use: No    Drug use: No    Sexual activity: Yes     Partners: Male   Lifestyle    Physical activity     Days per week: Not on file     Minutes per session: Not on file    Stress: Not on file   Relationships    Social connections     Talks on phone: Not on file     Gets together: Not on file     Attends Yazidi service: Not on file     Active member of club or organization: Not on file     Attends meetings of clubs or organizations: Not on file     Relationship status: Not on file    Intimate partner violence     Fear of current or ex partner: Not on file     Emotionally abused: Not on file     Physically abused: Not on file     Forced sexual activity: Not on file   Other Topics Concern    Not on file   Social History Narrative    Not on file       Vitals:    03/10/21 [facility] 621 BP: 126/80   Pulse: 56   Temp: 96.9 °F (36.1 °C)   SpO2: 99%        Body mass index is 30.07 kg/m². Physical Exam  Vitals signs and nursing note reviewed. Constitutional:       Appearance: She is well-developed. HENT:      Head: Normocephalic. Right Ear: External ear normal.      Left Ear: External ear normal.      Nose: Nose normal.      Mouth/Throat:      Pharynx: No oropharyngeal exudate. Neck:      Musculoskeletal: Normal range of motion and neck supple. Thyroid: No thyromegaly. Cardiovascular:      Rate and Rhythm: Normal rate. Pulmonary:      Effort: Pulmonary effort is normal.   Musculoskeletal: Normal range of motion. Neurological:      General: No focal deficit present. Psychiatric:         Mood and Affect: Mood normal.         ASSESSMENT/PLAN:  1. Encounter for screening mammogram for breast cancer    - [facility] Digital Screen Bilateral [[id number]]; Future    2. Essential hypertension    - atenolol (TENORMIN) 50 MG tablet; Take 1 tablet by mouth 2 times daily  Dispense: 180 tablet; Refill: 3    3. Thyroid nodule    - DC FINE NEEDLE ASPIRATION BX W/O IMG GDN 1ST LESION    4. Pure hypercholesterolemia    - lovastatin (MEVACOR) 40 MG tablet; Take 1 tablet by mouth daily  Dispense: 90 tablet; Refill: 3    5. Encounter for FIT (fecal immunochemical test) screening    - POCT FECAL IMMUNOCHEMICAL TEST (FIT); Future    Instructions:   Continue lovastatinand low fat diet. Continue BP med and continue monitor BP at home  Schedule for FNA for left thyroid  nodule     Reviewed previous notes, tests results and face to face with the patient discussing the diagnosis and importance of compliance with the treatment plan as well as  Documenting on the day of visit. Answered questions asked, to call for any other concerns        Return in about 3 months (around 5/31/2021) for htn, hyperlipidemia. An electronic signature was used to authenticate this note.     --Clemente Emery MD on 03/14/21 at 10:23 PM

## 2021-03-17 NOTE — PROGRESS NOTES
[facility]   Electrophysiology Note    Reason for office visit: Follow-Up from Hospital and Other (SVT)      HISTORY OF PRESENT ILLNESS: Debbie López is a 65 y.o. female who presents for hospital follow up. She presented to the emergency room on 2/2/21 with tachycardia. Her EKG at that time demonstrated SVT. She was given Adenosine with no improvement. She was then given Digoxin with improvement in her rates. On 2/4, she underwent OVIDIO that showed no mass or thrombus. She then underwent a cardioversion for atrial tachycardia. She was discharged with a monitor that showed no significant arrhythmia. Her EKG today, 3/18/2021 shows sinus kate rate 54. She reports that while taking the Eliquis, she was having a significant amount of GI upset. Since stopping this, the GI upset has resolved. She otherwise has been feeling well. Past Medical History:   has a past medical history of GERD (gastroesophageal reflux disease), Hyperlipidemia, and Hypertension. Past Surgical History:   has a past surgical history that includes Hysterectomy, total abdominal (1999); Tubal ligation (1972); Cholecystectomy (09/01); and Tonsillectomy (1964). Social History:   reports that she has never smoked. She has never used smokeless tobacco. She reports that she does not drink alcohol or use drugs. Family History: family history includes Cancer in her father; Diabetes in her mother; Heart Disease in her mother; High Blood Pressure in her mother; High Cholesterol in her mother; Vision Loss in her mother. Allergies:  Patient has no known allergies. Home Medications:    Prior to Admission medications    Medication Sig Start Date End Date Taking?  Authorizing Provider   ASPIRIN 81 PO Take 1 tablet by mouth daily   Yes Historical Provider, MD   lovastatin (MEVACOR) 40 MG tablet Take 1 tablet by mouth daily 3/10/21  Yes Ottoniel Patch, MD   atenolol (TENORMIN) 50 MG tablet Take 1 tablet by mouth 2 times daily 3/10/21 Yes Debra Rodgers MD   cetirizine (ZYRTEC) 10 MG tablet Take 0.5 tablets by mouth daily 2/5/21  Yes Celia Kate MD   fluticasone (FLONASE) 50 MCG/ACT nasal spray 2 sprays by Each Nostril route daily 4/8/20  Yes RADHA Shankar - CNP   Cholecalciferol (VITAMIN D) 2000 UNITS CAPS capsule Take by mouth Indications: otc   Yes Historical Provider, MD   lisinopril-hydroCHLOROthiazide (PRINZIDE;ZESTORETIC) 20-12.5 MG per tablet Take 1 tablet by mouth 2 times daily 12/22/20 2/20/21  Debra Rodgers MD   famotidine (PEPCID) 20 MG tablet Take 1 tablet by mouth daily 12/21/20 2/2/21  Debra Rodgers MD       REVIEW OF SYSTEMS:      All 14-point review of systems are completed and pertinent positives are mentioned in the history of present illness. Other systems are reviewed and are negative. Physical Examination:    /60   Pulse 57   Ht 5' 7\" (1.702 m)   Wt 192 lb 8 oz (87.3 kg)   SpO2 99%   BMI 30.15 kg/m²    Constitutional and General Appearance: alert, cooperative, no distress and appears stated age  [de-identified]: PERRL, no cervical lymphadenopathy. No masses palpable. Normal oral mucosa  Respiratory:  · Normal excursion and expansion without use of accessory muscles  · Resp Auscultation: Normal breath sounds without dullness or wheezing  Cardiovascular:  · The apical impulse is not displaced  · Heart tones are crisp and normal. regular S1 and S2. Abdomen:  · No masses or tenderness  · Bowel sounds present  Extremities:  ·  No Cyanosis or Clubbing  ·  Lower extremity edema: No  · Skin: Warm and dry  Neurological:  · Alert and oriented. · Moves all extremities well  · No abnormalities of mood, affect, memory, mentation, or behavior are noted    DATA:    ECG:  3/18/2021 sinus kate rate 54  ECHO: 2/2/2021  Summary   There is no evidence of mass or thrombus in the left atrium or appendage. Aortic valve appears sclerotic but opens adequately. Mild mitral and aortic regurgitation.     IMPRESSION: 1. SVT/Atrial tachycardia. 02/04/2021  Patient is a pleasant 78-year-old female with medical history significant for hypertension, hyperlipidemia, and thyromegaly who presents from home with urinary tract infection and supraventricular tachycardia.  Analysis of her presenting EKG is suggestive of supraventricular tachycardia of unknown etiology given no discernible P waves.  Repeat EKGs and EKGs from primary provider suggests atrial tachycardia with 2-1 conduction (variable conduction at PCPs office).  There is a discrete baseline between P waves consistent with atrial tachycardia.  She subsequently underwent OVIDIO cardioversion back to normal sinus rhythm. She started on Eliquis for 1 month and now presents for follow-up.    03/18/2021  Patient presents for follow-up. She did well on Eliquis however does have some GI upset with it. She has had no symptoms. She states she feels no significant difference between normal sinus rhythm pain when she was in a tachycardia. We discussed options including longer-term monitoring with a loop monitor and close clinical follow-up and weekly blood pressure/heart rate checks. Patient prefer the latter. We will have her follow-up with nurse practitioner in 3 months and with me as needed. All questions concerns addressed. - Check blood pressure and heart rates several times a week. If above 100 while at rest then all me and let me know so we can get monitor or EKG. - Follow up with EP NP 1 year unless/until procedure/discussion required or PRN. RECOMMENDATIONS:  1. Discussed options moving forward: continuing to monitor for a recurrence of symptoms vs loop recorder placement for long heart rhythm monitoring. 2. As no afib was noted on your cardiac event monitor, if you chose, it is okay to remain off of the blood thinner. 3. Monitor your heart rate at home 2-3 times weekly. If >100 at rest, call our office. 4. Follow up with Ryan Rae CNP in 1 year. QUALITY MEASURES  1. Tobacco Cessation Counseling: NA  2. Retake of BP if >140/90:   NA  3. Documentation to PCP/referring for new patient:  Sent to PCP at close of office visit  4. CAD patient on anti-platelet: NA  5. CAD patient on STATIN therapy:  NA  6. Patient with CHF and aFib on anticoagulation:  NA     All questions and concerns were addressed to the patient/family. Alternatives to my treatment were discussed. Dr. Deborah Barrientos MD  Electrophysiology  [facility] [facility]. [address]. [facility] [address]  Phone: [phone]  Fax: [phone]   3/18/2021     This note was scribed in the presence of Dr. Perlita Miller MD by Rinku Giraldo RN. NOTE: This report was transcribed using voice recognition software. Every effort was made to ensure accuracy, however, inadvertent computerized transcription errors may be present. The scribe's documentation has been prepared under my direction and personally reviewed by me in its entirety. I confirm that the note above accurately reflects all work, physical examination, the discussion of treatments and procedures, and medical decision making performed by me. Araceli Tabares MD personally performed the services described in this documentation as scribed by nurse in my presence, and is both accurate and complete.     Electronically signed by Holly Miguel MD on 3/18/2021 at 10:12 AM

## 2021-03-18 ENCOUNTER — OFFICE VISIT (OUTPATIENT)
Dept: CARDIOLOGY CLINIC | Age: 71
End: 2021-03-18
Payer: COMMERCIAL

## 2021-03-18 VITALS
DIASTOLIC BLOOD PRESSURE: 60 MMHG | HEART RATE: 57 BPM | BODY MASS INDEX: 30.21 KG/M2 | WEIGHT: 192.5 LBS | HEIGHT: 67 IN | OXYGEN SATURATION: 99 % | SYSTOLIC BLOOD PRESSURE: 130 MMHG

## 2021-03-18 DIAGNOSIS — I47.1 SVT (SUPRAVENTRICULAR TACHYCARDIA) (HCC): Primary | ICD-10-CM

## 2021-03-18 PROCEDURE — 1111F DSCHRG MED/CURRENT MED MERGE: CPT | Performed by: INTERNAL MEDICINE

## 2021-03-18 PROCEDURE — 93000 ELECTROCARDIOGRAM COMPLETE: CPT | Performed by: INTERNAL MEDICINE

## 2021-03-18 PROCEDURE — 99203 OFFICE O/P NEW LOW 30 MIN: CPT | Performed by: INTERNAL MEDICINE

## 2021-03-18 NOTE — PATIENT INSTRUCTIONS
RECOMMENDATIONS:  1. Discussed options moving forward: continuing to monitor for a recurrence of symptoms vs loop recorder placement for long heart rhythm monitoring. 2. As no afib was noted on your cardiac event monitor, if you chose, it is okay to remain off of the blood thinner. 3. Monitor your heart rate at home 2-3 times weekly. If >100 at rest, call our office. 4. Follow up with Alexa Dan CNP in 1 year.

## 2021-03-26 ENCOUNTER — TELEPHONE (OUTPATIENT)
Dept: FAMILY MEDICINE CLINIC | Age: 71
End: 2021-03-26

## 2021-03-26 DIAGNOSIS — I10 ESSENTIAL HYPERTENSION: ICD-10-CM

## 2021-03-26 RX ORDER — LISINOPRIL AND HYDROCHLOROTHIAZIDE 20; 12.5 MG/1; MG/1
1 TABLET ORAL 2 TIMES DAILY
Qty: 60 TABLET | Refills: 2 | Status: SHIPPED | OUTPATIENT
Start: 2021-03-26 | End: 2021-06-01 | Stop reason: SDUPTHER

## 2021-03-26 NOTE — TELEPHONE ENCOUNTER
Pt requesting refill    lisinopril-hydroCHLOROthiazide (PRINZIDE;ZESTORETIC) 20-12.5 MG per tablet    Pharmacy    [facility] [address], [address] [place] [address]     Last appt.   3/10/2021  Future Appointments   Date Time Provider Praveen Wilkerson   6/1/2021 10:40 AM MD EMERITA Grewal

## 2021-04-01 ENCOUNTER — VIRTUAL VISIT (OUTPATIENT)
Dept: FAMILY MEDICINE CLINIC | Age: 71
End: 2021-04-01
Payer: COMMERCIAL

## 2021-04-01 DIAGNOSIS — M79.674 TOE PAIN, RIGHT: Primary | ICD-10-CM

## 2021-04-01 PROCEDURE — G2012 BRIEF CHECK IN BY MD/QHP: HCPCS | Performed by: INTERNAL MEDICINE

## 2021-04-01 RX ORDER — METHYLPREDNISOLONE 4 MG/1
4 TABLET ORAL SEE ADMIN INSTRUCTIONS
Qty: 1 KIT | Refills: 0 | Status: SHIPPED | OUTPATIENT
Start: 2021-04-01 | End: 2021-04-07

## 2021-04-01 NOTE — PROGRESS NOTES
Edwina Klinefelter is a 65 y.o. female evaluated via telephone on 4/1/2021. Consent:  She and/or health care decision maker is aware that that she may receive a bill for this telephone service, depending on her insurance coverage, and has provided verbal consent to proceed: Yes     Right big toe pain- since yesterday, took   Ibuprofen. Today fells a little worse, never had c gout in the past    Documentation:  I communicated with the patient and/or health care decision maker about   Details of this discussion including any medical advice provided: as above    Acute right big toe gout- med sent to pharmacy    I affirm this is a Patient Initiated Episode with a Patient who has not had a related appointment within my department in the past 7 days or scheduled within the next 24 hours. Patient identification was verified at the start of the visit: Yes    Total Time: minutes: 5-10 minutes    The visit was conducted pursuant to the emergency declaration under the [address] [place] [address], [address] [place] [address] authority and the [employer] Act. Patient identification was verified, and a caregiver was present when appropriate. The patient was located in a state where the provider was credentialed to provide care.     Note: not billable if this call serves to triage the patient into an appointment for the relevant concern      Alejandro Saeed

## 2021-04-29 ENCOUNTER — TELEPHONE (OUTPATIENT)
Dept: FAMILY MEDICINE CLINIC | Age: 71
End: 2021-04-29

## 2021-04-29 NOTE — TELEPHONE ENCOUNTER
----- Message from Noy Jamie sent at 4/29/2021  2:30 PM EDT -----  Subject: Message to Provider    QUESTIONS  Information for Provider? Wants to know if she needs to fast before uric   acid test  ---------------------------------------------------------------------------  --------------  CALL BACK INFO  What is the best way for the office to contact you? OK to leave message on   voicemail  Preferred Call Back Phone Number? [phone]  ---------------------------------------------------------------------------  --------------  SCRIPT ANSWERS  Relationship to Patient?  Self

## 2021-05-12 ENCOUNTER — OFFICE VISIT (OUTPATIENT)
Dept: FAMILY MEDICINE CLINIC | Age: 71
End: 2021-05-12

## 2021-05-12 ENCOUNTER — TELEPHONE (OUTPATIENT)
Dept: FAMILY MEDICINE CLINIC | Age: 71
End: 2021-05-12

## 2021-05-12 DIAGNOSIS — Z12.31 ENCOUNTER FOR SCREENING MAMMOGRAM FOR BREAST CANCER: Primary | ICD-10-CM

## 2021-05-12 NOTE — TELEPHONE ENCOUNTER
Patient called said she tried to  atenolol (TENORMIN) 50 MG tablet     And pharmacy is telling her she is not due to  yet and she is out

## 2021-05-13 ENCOUNTER — TELEPHONE (OUTPATIENT)
Dept: FAMILY MEDICINE CLINIC | Age: 71
End: 2021-05-13

## 2021-05-13 NOTE — TELEPHONE ENCOUNTER
She had a prescription for 3 months ordered in March which is supposed to last her for 3 months with 3 refills, . Call pharmacy  what is going on.

## 2021-06-01 ENCOUNTER — OFFICE VISIT (OUTPATIENT)
Dept: FAMILY MEDICINE CLINIC | Age: 71
End: 2021-06-01
Payer: COMMERCIAL

## 2021-06-01 VITALS
HEIGHT: 67 IN | BODY MASS INDEX: 30.29 KG/M2 | RESPIRATION RATE: 16 BRPM | OXYGEN SATURATION: 98 % | HEART RATE: 48 BPM | DIASTOLIC BLOOD PRESSURE: 70 MMHG | SYSTOLIC BLOOD PRESSURE: 122 MMHG | WEIGHT: 193 LBS | TEMPERATURE: 97.3 F

## 2021-06-01 DIAGNOSIS — I10 ESSENTIAL HYPERTENSION: Primary | ICD-10-CM

## 2021-06-01 DIAGNOSIS — E78.00 PURE HYPERCHOLESTEROLEMIA: ICD-10-CM

## 2021-06-01 DIAGNOSIS — J30.2 SEASONAL ALLERGIC RHINITIS, UNSPECIFIED TRIGGER: ICD-10-CM

## 2021-06-01 DIAGNOSIS — K21.9 GASTROESOPHAGEAL REFLUX DISEASE WITHOUT ESOPHAGITIS: ICD-10-CM

## 2021-06-01 DIAGNOSIS — Z12.31 ENCOUNTER FOR SCREENING MAMMOGRAM FOR BREAST CANCER: ICD-10-CM

## 2021-06-01 DIAGNOSIS — M10.071 ACUTE IDIOPATHIC GOUT INVOLVING TOE OF RIGHT FOOT: ICD-10-CM

## 2021-06-01 LAB
A/G RATIO: 2.3 (ref 1.1–2.2)
ALBUMIN SERPL-MCNC: 4.4 G/DL (ref 3.4–5)
ALP BLD-CCNC: 61 U/L (ref 40–129)
ALT SERPL-CCNC: 17 U/L (ref 10–40)
ANION GAP SERPL CALCULATED.3IONS-SCNC: 11 MMOL/L (ref 3–16)
AST SERPL-CCNC: 20 U/L (ref 15–37)
BILIRUB SERPL-MCNC: 0.5 MG/DL (ref 0–1)
BUN BLDV-MCNC: 16 MG/DL (ref 7–20)
CALCIUM SERPL-MCNC: 9.4 MG/DL (ref 8.3–10.6)
CHLORIDE BLD-SCNC: 103 MMOL/L (ref 99–110)
CHOLESTEROL, TOTAL: 171 MG/DL (ref 0–199)
CO2: 28 MMOL/L (ref 21–32)
CREAT SERPL-MCNC: 0.8 MG/DL (ref 0.6–1.2)
GFR AFRICAN AMERICAN: >60
GFR NON-AFRICAN AMERICAN: >60
GLOBULIN: 1.9 G/DL
GLUCOSE BLD-MCNC: 116 MG/DL (ref 70–99)
HDLC SERPL-MCNC: 38 MG/DL (ref 40–60)
LDL CHOLESTEROL CALCULATED: 90 MG/DL
POTASSIUM SERPL-SCNC: 4.4 MMOL/L (ref 3.5–5.1)
SODIUM BLD-SCNC: 142 MMOL/L (ref 136–145)
TOTAL PROTEIN: 6.3 G/DL (ref 6.4–8.2)
TRIGL SERPL-MCNC: 217 MG/DL (ref 0–150)
URIC ACID, SERUM: 9.1 MG/DL (ref 2.6–6)
VLDLC SERPL CALC-MCNC: 43 MG/DL

## 2021-06-01 PROCEDURE — 36415 COLL VENOUS BLD VENIPUNCTURE: CPT | Performed by: INTERNAL MEDICINE

## 2021-06-01 PROCEDURE — 99214 OFFICE O/P EST MOD 30 MIN: CPT | Performed by: INTERNAL MEDICINE

## 2021-06-01 RX ORDER — FAMOTIDINE 20 MG/1
20 TABLET, FILM COATED ORAL DAILY
Qty: 30 TABLET | Refills: 3 | Status: SHIPPED | OUTPATIENT
Start: 2021-06-01 | Stop reason: SDUPTHER

## 2021-06-01 RX ORDER — CETIRIZINE HYDROCHLORIDE 10 MG/1
5 TABLET ORAL DAILY
Qty: 30 TABLET | Refills: 1 | Status: SHIPPED | OUTPATIENT
Start: 2021-06-01

## 2021-06-01 RX ORDER — LOVASTATIN 40 MG/1
40 TABLET ORAL DAILY
Qty: 90 TABLET | Refills: 1 | Status: SHIPPED | OUTPATIENT
Start: 2021-06-01 | End: 2021-12-13 | Stop reason: SDUPTHER

## 2021-06-01 RX ORDER — FLUTICASONE PROPIONATE 50 MCG
2 SPRAY, SUSPENSION (ML) NASAL DAILY
Qty: 16 G | Refills: 0 | Status: SHIPPED | OUTPATIENT
Start: 2021-06-01

## 2021-06-01 RX ORDER — LISINOPRIL AND HYDROCHLOROTHIAZIDE 20; 12.5 MG/1; MG/1
1 TABLET ORAL 2 TIMES DAILY
Qty: 60 TABLET | Refills: 3 | Status: SHIPPED | OUTPATIENT
Start: 2021-06-01 | End: 2021-10-20 | Stop reason: SDUPTHER

## 2021-06-01 SDOH — ECONOMIC STABILITY: FOOD INSECURITY: WITHIN THE PAST 12 MONTHS, YOU WORRIED THAT YOUR FOOD WOULD RUN OUT BEFORE YOU GOT MONEY TO BUY MORE.: NEVER TRUE

## 2021-06-01 SDOH — ECONOMIC STABILITY: FOOD INSECURITY: WITHIN THE PAST 12 MONTHS, THE FOOD YOU BOUGHT JUST DIDN'T LAST AND YOU DIDN'T HAVE MONEY TO GET MORE.: NEVER TRUE

## 2021-06-01 ASSESSMENT — ENCOUNTER SYMPTOMS
SORE THROAT: 0
COUGH: 0

## 2021-06-01 ASSESSMENT — SOCIAL DETERMINANTS OF HEALTH (SDOH): HOW HARD IS IT FOR YOU TO PAY FOR THE VERY BASICS LIKE FOOD, HOUSING, MEDICAL CARE, AND HEATING?: NOT HARD AT ALL

## 2021-06-01 NOTE — PROGRESS NOTES
21    Júnior Walter (: 1950) is a 65 y.o. female, here for evaluation of the following medical concerns:    HPI; Treatment Adherence:   Medication compliance:  compliant most of the time  Diet compliance:  compliant most of the time  Weight trend: stable  Current exercise: no regular exercise  Barriers: none    Hypertension:  Home blood pressure monitoring: Yes -. Patient denies chest pain. Antihypertensive medication side effects: no medication side effects noted. Use of agents associated with hypertension: none. Sodium (mmol/L)   Date Value   2021 141    BUN (mg/dL)   Date Value   2021 17    Glucose (mg/dL)   Date Value   2021 106 (H)      Potassium reflex Magnesium (mmol/L)   Date Value   2021 3.3 (L)    CREATININE (mg/dL)   Date Value   2021 0.8         Hyperlipidemia:  No new myalgias or GI upset on lovastatin (Mevacor). Lab Results   Component Value Date    CHOL 168 2020    TRIG 183 (H) 2020    HDL 37 (L) 2020    LDLCALC 94 2020     Lab Results   Component Value Date    ALT 15 2021    AST 13 (L) 2021        hyPOKALEMIA-  k-3.3. [employer] Recheckmpotassium level. hAS AN Impaired fasting  Blood sugar. Sister and  Mother are diabetic. Had a  gout attack on her right big toe. Will check uric acid level. Review of Systems   Constitutional: Negative for activity change. HENT: Negative. Negative for sore throat. Respiratory: Negative for cough. Cardiovascular: Negative for chest pain. Gastrointestinal:        Taking pepcid OTC for gerd   Musculoskeletal: Negative for neck pain. Neurological: Negative for dizziness and headaches. Speech difficulty:    Hematological: Does not bruise/bleed easily. Psychiatric/Behavioral: Negative.         Current Outpatient Medications   Medication Sig Dispense Refill    lovastatin (MEVACOR) 40 MG tablet Take 1 tablet by mouth daily 90 tablet 1    cetirizine (ZYRTEC) 10 MG tablet Take 0.5 tablets by mouth daily 30 tablet 1    famotidine (PEPCID) 20 MG tablet Take 1 tablet by mouth daily 30 tablet 3    fluticasone (FLONASE) 50 MCG/ACT nasal spray 2 sprays by Each Nostril route daily 16 g 0    lisinopril-hydroCHLOROthiazide (PRINZIDE;ZESTORETIC) 20-12.5 MG per tablet Take 1 tablet by mouth 2 times daily 60 tablet 3    ASPIRIN 81 PO Take 1 tablet by mouth daily      atenolol (TENORMIN) 50 MG tablet Take 1 tablet by mouth 2 times daily 180 tablet 3    Cholecalciferol (VITAMIN D) 2000 UNITS CAPS capsule Take by mouth Indications: otc       No current facility-administered medications for this visit. Social History     Socioeconomic History    Marital status:      Spouse name: Not on file    Number of children: Not on file    Years of education: Not on file    Highest education level: Not on file   Occupational History    Not on file   Tobacco Use    Smoking status: Never Smoker    Smokeless tobacco: Never Used   Vaping Use    Vaping Use: Never used   Substance and Sexual Activity    Alcohol use: No    Drug use: No    Sexual activity: Yes     Partners: Male   Other Topics Concern    Not on file   Social History Narrative    Not on file     Social Determinants of Health     Financial Resource Strain: Low Risk     Difficulty of Paying Living Expenses: Not hard at all   Food Insecurity: No Food Insecurity    Worried About [address] in the Last Year: Never true    [address] in the Last Year: Never true   Transportation Needs:     Lack of Transportation (Medical):      Lack of Transportation (Non-Medical):    Physical Activity:     Days of Exercise per Week:     Minutes of Exercise per Session:    Stress:     Feeling of Stress :    Social Connections:     Frequency of Communication with Friends and Family:     Frequency of Social Gatherings with Friends and Family:     Attends [employer] Services:     Active Member of Clubs or Organizations:     Attends Club or Organization Meetings:     Marital Status:    Intimate Partner Violence:     Fear of Current or Ex-Partner:     Emotionally Abused:     Physically Abused:     Sexually Abused:        Vitals:    06/01/21 1045   BP: 122/70   Pulse: (!) 48   Resp: 16   Temp: 97.3 °F (36.3 °C)   SpO2: 98%        Body mass index is 30.23 kg/m². Physical Exam  Vitals and nursing note reviewed. Constitutional:       Appearance: She is well-developed. HENT:      Head: Normocephalic. Right Ear: External ear normal.      Left Ear: External ear normal.      Nose: Nose normal.      Mouth/Throat:      Pharynx: No oropharyngeal exudate. Neck:      Thyroid: No thyromegaly. Cardiovascular:      Rate and Rhythm: Normal rate. Pulmonary:      Effort: Pulmonary effort is normal.   Musculoskeletal:         General: Normal range of motion. Cervical back: Normal range of motion and neck supple. Neurological:      General: No focal deficit present. Psychiatric:         Mood and Affect: Mood normal.         ASSESSMENT/PLAN:  1. Encounter for screening mammogram for breast cancer    - [facility] Digital Screen Bilateral [[id number]]; Future    2. Pure hypercholesterolemia    - lovastatin (MEVACOR) 40 MG tablet; Take 1 tablet by mouth daily  Dispense: 90 tablet; Refill: 1    3. Gastroesophageal reflux disease without esophagitis    - famotidine (PEPCID) 20 MG tablet; Take 1 tablet by mouth daily  Dispense: 30 tablet; Refill: 3    4. Seasonal allergic rhinitis, unspecified trigger    - fluticasone (FLONASE) 50 MCG/ACT nasal spray; 2 sprays by Each Nostril route daily  Dispense: 16 g; Refill: 0    5. Essential hypertension    - lisinopril-hydroCHLOROthiazide (PRINZIDE;ZESTORETIC) 20-12.5 MG per tablet; Take 1 tablet by mouth 2 times daily  Dispense: 60 tablet; Refill: 3      6. Acute idiopathic gout    - uric acid     Instruction;  -monitor BP at home   -Stay off beer  To prevent gout attacks  -continue low salt, low fat diet      Reviewed previous notes, tests results and face to face with the patient discussing the diagnosis and importance  of compliance with the treatments as well as documenting on the day of visit. Answered questions and encouraged to call  for any other concerns. An electronic signature was used to authenticate this note.     --Nilsa Geiger MD on 06/01/21 at 11:08 AM

## 2021-06-03 DIAGNOSIS — E79.0 HYPERURICEMIA: Primary | ICD-10-CM

## 2021-06-03 RX ORDER — ALLOPURINOL 100 MG/1
100 TABLET ORAL DAILY
Qty: 30 TABLET | Refills: 5 | Status: SHIPPED | OUTPATIENT
Start: 2021-06-03 | End: 2021-12-13 | Stop reason: SDUPTHER

## 2021-07-15 ENCOUNTER — TELEPHONE (OUTPATIENT)
Dept: FAMILY MEDICINE CLINIC | Age: 71
End: 2021-07-15

## 2021-07-15 ENCOUNTER — TELEPHONE (OUTPATIENT)
Dept: CARDIOLOGY CLINIC | Age: 71
End: 2021-07-15

## 2021-07-15 NOTE — TELEPHONE ENCOUNTER
Cyrus Garza states she went to [facility] emergency department yesterday for high heart rate. Her heart rate has not went down. Cyrus Garza says it is 113. Patient was not given medication for it. Fransico Dumont said she was told that she may have hypothyroidism. Cyrus Garza is asking for medication. Please advise.

## 2021-07-15 NOTE — TELEPHONE ENCOUNTER
07/15-Pt's daughter Tena Salcedo called into the office, stated her mother had another episode and was in the hospital @  Glenda Zarco stated that [address] [facility],[address] stated that if her mother did have another episode to call and get into the office. Episode is fast heart rate,  after medication was dropped down to 105. /98. Please advice a date/time for overbook if [facility] approves, due to being booked until October.      Please contact Tena Salcedo @ [phone]

## 2021-07-16 NOTE — TELEPHONE ENCOUNTER
Pt has appt   Future Appointments   Date Time Provider Praveen Rhea   7/21/2021  1:20 PM MD EMERITA Morales   10/13/2021  2:00 PM MD EMERITA Morales

## 2021-07-19 NOTE — TELEPHONE ENCOUNTER
LVM for patient's daughter, Marino Montgomery, to call back. Left message on phone number requested.

## 2021-07-21 ENCOUNTER — OFFICE VISIT (OUTPATIENT)
Dept: FAMILY MEDICINE CLINIC | Age: 71
End: 2021-07-21
Payer: COMMERCIAL

## 2021-07-21 VITALS
DIASTOLIC BLOOD PRESSURE: 70 MMHG | TEMPERATURE: 96.6 F | RESPIRATION RATE: 16 BRPM | OXYGEN SATURATION: 98 % | HEIGHT: 67 IN | BODY MASS INDEX: 30.17 KG/M2 | SYSTOLIC BLOOD PRESSURE: 132 MMHG | HEART RATE: 49 BPM | WEIGHT: 192.2 LBS

## 2021-07-21 DIAGNOSIS — E04.1 THYROID NODULE: ICD-10-CM

## 2021-07-21 DIAGNOSIS — R00.1 SINUS BRADYCARDIA: Primary | ICD-10-CM

## 2021-07-21 DIAGNOSIS — M10.071 ACUTE IDIOPATHIC GOUT INVOLVING TOE OF RIGHT FOOT: ICD-10-CM

## 2021-07-21 DIAGNOSIS — I10 ESSENTIAL HYPERTENSION: ICD-10-CM

## 2021-07-21 DIAGNOSIS — Z12.31 ENCOUNTER FOR SCREENING MAMMOGRAM FOR BREAST CANCER: ICD-10-CM

## 2021-07-21 DIAGNOSIS — E78.00 PURE HYPERCHOLESTEROLEMIA: ICD-10-CM

## 2021-07-21 DIAGNOSIS — R19.5 POSITIVE FIT (FECAL IMMUNOCHEMICAL TEST): ICD-10-CM

## 2021-07-21 PROCEDURE — 93000 ELECTROCARDIOGRAM COMPLETE: CPT | Performed by: INTERNAL MEDICINE

## 2021-07-21 PROCEDURE — 99214 OFFICE O/P EST MOD 30 MIN: CPT | Performed by: INTERNAL MEDICINE

## 2021-07-29 ASSESSMENT — ENCOUNTER SYMPTOMS
SORE THROAT: 0
COUGH: 0

## 2021-07-30 NOTE — PROGRESS NOTES
Leanne Duque (:  1950) is a 65 y.o. female,Established patient, here for evaluation of the following chief complaint(s):  ED Follow-up [facility] on 21 for rapid heartbeat; hospital tested thyroid and stated patient needs further labs)         ASSESSMENT/PLAN:  1. Sinus bradycardia  -     EKG 12 Lead; Future  2. Encounter for screening mammogram for breast cancer  - given prescription  3. Thyroid nodule  - will follow up with her family's endocrinologist  4. Acute idiopathic gout involving toe of right foot    - on allopurinol  5. Essential hypertension    -on atenolol 50 mg BID and      Lisinopril 20/12.5 mg BID  6. Pure hypercholesterolemia    -takes lovastatin 40 mg daily  7. Positive FIT (fecal immunochemical test)   - refer to GI of choice    Return in about 13 weeks (around 10/20/2021) for hyperlipidemia. Subjective   SUBJECTIVE/OBJECTIVE:  HPI  CC- irregular heart beat- came for follow up for her ER visit in [address] [place] [address]. Was in sinus tachycardia when she wasin the ER and was discharged home fo follow up. Patient has had supraventricular spells in the past and has been seen by cardiology. Today her EK showed sinus bradycardia with occasional PAC's and voltage criteria  Consistent with LVH. She had a thyroid ultrasound done a couple of months ago on one of her ER visit which showed nodule  That need FNA abd her TSH ws also slightly  low at 4.31   But patient states thyroid problem   runs in her family and she will make an appointment  To see her family's endocrinologist who is familiar with heir family history of thyroid issues.  -Her blood pressure is well controlled with her meds.     Chemistry        Component Value Date/Time     2021 1123    K 4.4 2021 1123    K 3.3 (L) 2021 0426     2021 1123    CO2 28 2021 1123    BUN 16 2021 1123    CREATININE 0.8 2021 1123        Component Value Date/Time    CALCIUM 9.4 06/01/2021 1123    ALKPHOS 61 06/01/2021 1123    AST 20 06/01/2021 1123    ALT 17 06/01/2021 1123    BILITOT 0.5 06/01/2021 1123        Her cholesterol is also well controlled with lovasatin 40 mg. Lab Results   Component Value Date    CHOL 171 06/01/2021    CHOL 168 12/08/2020    CHOL 151 06/24/2020     Lab Results   Component Value Date    TRIG 217 (H) 06/01/2021    TRIG 183 (H) 12/08/2020    TRIG 178 (H) 06/24/2020     Lab Results   Component Value Date    HDL 38 (L) 06/01/2021    HDL 37 (L) 12/08/2020    HDL 36 (L) 06/24/2020     Lab Results   Component Value Date    LDLCALC 90 06/01/2021    [address] 94 12/08/2020    [address] 79 06/24/2020     She is on allopurinol 100mg for her gout on her right big toe. Review of Systems   Constitutional: Negative for activity change. HENT: Negative. Negative for sore throat. Respiratory: Negative for cough. Cardiovascular: Negative for chest pain. Gastrointestinal:        Taking pepcid OTC for gerd   Musculoskeletal: Negative for neck pain. Neurological: Negative for dizziness and headaches. Speech difficulty:    Hematological: Does not bruise/bleed easily. Psychiatric/Behavioral: Negative. Objective   Physical Exam  Vitals and nursing note reviewed. Constitutional:       Appearance: She is well-developed. HENT:      Head: Normocephalic. Right Ear: External ear normal.      Left Ear: External ear normal.      Nose: Nose normal.      Mouth/Throat:      Pharynx: No oropharyngeal exudate. Neck:      Thyroid: No thyromegaly. Cardiovascular:      Rate and Rhythm: Normal rate. Pulmonary:      Effort: Pulmonary effort is normal.   Musculoskeletal:         General: Normal range of motion. Cervical back: Normal range of motion and neck supple. Neurological:      General: No focal deficit present.    Psychiatric:         Mood and Affect: Mood normal.     instruction:  -continue meds  -see endo for thyroid nodule  -see Gi for colonoscopy     Follow up in 3 months    Reviewed previous notes, test results and face to face with the patient discussing the diagnosis and importance of compliance with the treatment plan as well as documenting on the day of the visit. An electronic signature was used to authenticate this note.     --Nolan Christina MD

## 2021-08-02 ENCOUNTER — TELEPHONE (OUTPATIENT)
Dept: FAMILY MEDICINE CLINIC | Age: 71
End: 2021-08-02

## 2021-08-02 NOTE — TELEPHONE ENCOUNTER
Patient called when she was here 7/21/2021. She had been to [facility].    Dr. Tyron Forrester was to see if we can get the records and look over them and call patient.     Please see records in Media

## 2021-08-03 NOTE — TELEPHONE ENCOUNTER
Patient informed that she can see daughter's endocrinologist regarding thyroid. Patient received letter from [facility] stating that they found an abnormal finding in her lung (incidental finding) from visit on 7/14/21 with chest xray. Patient is confused and wants to know how to proceed. Advised patient that we haven't received any communication from [facility] regarding the above.

## 2021-08-09 ENCOUNTER — OFFICE VISIT (OUTPATIENT)
Dept: FAMILY MEDICINE CLINIC | Age: 71
End: 2021-08-09
Payer: COMMERCIAL

## 2021-08-09 VITALS
WEIGHT: 191.4 LBS | HEIGHT: 67 IN | TEMPERATURE: 97.3 F | RESPIRATION RATE: 16 BRPM | DIASTOLIC BLOOD PRESSURE: 70 MMHG | SYSTOLIC BLOOD PRESSURE: 132 MMHG | BODY MASS INDEX: 30.04 KG/M2 | OXYGEN SATURATION: 98 % | HEART RATE: 51 BPM

## 2021-08-09 DIAGNOSIS — R91.8 PULMONARY NODULES: Primary | ICD-10-CM

## 2021-08-09 DIAGNOSIS — I10 ESSENTIAL HYPERTENSION: ICD-10-CM

## 2021-08-09 DIAGNOSIS — R19.5 POSITIVE FIT (FECAL IMMUNOCHEMICAL TEST): ICD-10-CM

## 2021-08-09 DIAGNOSIS — E78.00 PURE HYPERCHOLESTEROLEMIA: ICD-10-CM

## 2021-08-09 DIAGNOSIS — E04.1 LEFT THYROID NODULE: ICD-10-CM

## 2021-08-09 DIAGNOSIS — R00.0 TACHYCARDIA: ICD-10-CM

## 2021-08-09 DIAGNOSIS — R21 SKIN RASH: ICD-10-CM

## 2021-08-09 PROCEDURE — 99214 OFFICE O/P EST MOD 30 MIN: CPT | Performed by: INTERNAL MEDICINE

## 2021-08-12 ENCOUNTER — TELEPHONE (OUTPATIENT)
Dept: FAMILY MEDICINE CLINIC | Age: 71
End: 2021-08-12

## 2021-08-12 NOTE — TELEPHONE ENCOUNTER
The pt called and stated that she needs a referral to [place], Dr Nancy Sutherland, the Faxed number is [phone]

## 2021-08-15 RX ORDER — CLOTRIMAZOLE AND BETAMETHASONE DIPROPIONATE 10; .64 MG/G; MG/G
CREAM TOPICAL
Qty: 45 G | Refills: 1 | Status: SHIPPED | OUTPATIENT
Start: 2021-08-15

## 2021-08-15 ASSESSMENT — ENCOUNTER SYMPTOMS
SORE THROAT: 0
COUGH: 0

## 2021-08-15 NOTE — PROGRESS NOTES
Emily Hua (:  1950) is a 65 y.o. female,Established patient, here for evaluation of the following chief complaint(s): Other (discuss lung finding from ER visit to [facility] on 21, patient brought letter)         ASSESSMENT/PLAN:  1. Pulmonary nodules    - follow up CT of the chest in 3 months  2. Skin rash  -     clotrimazole-betamethasone (LOTRISONE) 1-0.05 % cream; Apply to affected area twice a day as needed, Disp-45 g, R-1, Normal  3. Left thyroid nodule       -will see endocrinologist  4. Essential hypertension      -controlled with meds  5. Pure hypercholesterolemia     - on lovastatin 40 mg  6. Tachycardia  -   - on atenolol 50 mg bid  7. Positive FIT (fecal immunochemical test)     -postponed colonoscopy and Fit test     Follow up in 2 months for wellness    Subjective   SUBJECTIVE/OBJECTIVE:  HPI  CC- follow up from ER visit in [facility]   Patient came for follow-up after her visit to [facility].  She apparently got a letter from the hospital with a notation that she had some incidental radiologic findings on her CT of her chest.  Patient was very concerned and she came to the office for follow-up. Apparently they had noted some pulmonary nodules on her CT scan which was done to rule out pulmonary embolism which she did not have. Patient was advised to have follow-up for this pulmonary nodules. I advised Karina Altamirano that she does not have any pulmonary problem that we are aware of but we will do a repeat CT of the chest without contrast in 3 months for follow-up. Letter    She also was noted to have an enlarged left thyroid gland which were aware of and she had been advised to have fine-needle biopsy of that gland. Patient states that the her family has thyroid problems and she would want to go to the specialist that her family go to.   This is a reasonable request and I advised her that this would be the best way to go see his endocrinologist no other family history well. And letter    Her hypertension has been controlled with atenolol 50 mg twice a day and lisinopril HCTZ 2012 0.5 twice a day. Chemistry        Component Value Date/Time     06/01/2021 1123    K 4.4 06/01/2021 1123    K 3.3 (L) 02/03/2021 0426     06/01/2021 1123    CO2 28 06/01/2021 1123    BUN 16 06/01/2021 1123    CREATININE 0.8 06/01/2021 1123        Component Value Date/Time    CALCIUM 9.4 06/01/2021 1123    ALKPHOS 61 06/01/2021 1123    AST 20 06/01/2021 1123    ALT 17 06/01/2021 1123    BILITOT 0.5 06/01/2021 1123          Her lipids have also been controlled with lovastatin 40 mg once a day. Lab Results   Component Value Date    CHOL 171 06/01/2021    CHOL 168 12/08/2020    CHOL 151 06/24/2020     Lab Results   Component Value Date    TRIG 217 (H) 06/01/2021    TRIG 183 (H) 12/08/2020    TRIG 178 (H) 06/24/2020     Lab Results   Component Value Date    HDL 38 (L) 06/01/2021    HDL 37 (L) 12/08/2020    HDL 36 (L) 06/24/2020     Lab Results   Component Value Date    LDLCALC 90 06/01/2021    LDLCALC 94 12/08/2020    LDLCALC 79 06/24/2020       Heide has had a positive fit test and has been advised to have a colonoscopy which she had postponed. She also had a Cologuard ordered but unfortunately she said it stayed in her car too long and she threw it away. We will try to get another fit test to see what that would show. Keo Weiner has had some rhythm problem but her last 24-hour Holter monitor showed that she has a sinus rhythm. And so far it has stayed that way. Keo Weiner requested for a prescription for Lotrisone cream which she uses for nonspecific skin rash. Review of Systems   Constitutional: Negative for activity change. HENT: Negative. Negative for sore throat. Respiratory: Negative for cough. Cardiovascular: Negative for chest pain. Gastrointestinal:        Taking pepcid OTC for gerd   Musculoskeletal: Negative for neck pain.    Neurological: Negative for dizziness and headaches. Speech difficulty:    Hematological: Does not bruise/bleed easily. Psychiatric/Behavioral: Negative. Objective   Physical Exam  Vitals and nursing note reviewed. Constitutional:       Appearance: She is well-developed. HENT:      Head: Normocephalic. Right Ear: External ear normal.      Left Ear: External ear normal.      Nose: Nose normal.      Mouth/Throat:      Pharynx: No oropharyngeal exudate. Neck:      Thyroid: No thyromegaly. Cardiovascular:      Rate and Rhythm: Normal rate. Pulmonary:      Effort: Pulmonary effort is normal.   Musculoskeletal:         General: Normal range of motion. Cervical back: Normal range of motion and neck supple. Neurological:      General: No focal deficit present. Psychiatric:         Mood and Affect: Mood normal.     Instruction:  See endo for thyroid nodule  Follow up chest CT in 3 months    Return in 2 months for wellness visit     Reviewed previous notes, tests results and face to face with the patient discussing the diagnosis and importance  of compliance with the treatments as well as documenting on the day of visit. Answered questions and encouraged to call  for any other concerns. An electronic signature was used to authenticate this note.     --Jenn Rojas MD

## 2021-09-02 DIAGNOSIS — E04.1 LEFT THYROID NODULE: Primary | ICD-10-CM

## 2021-10-13 ENCOUNTER — OFFICE VISIT (OUTPATIENT)
Dept: FAMILY MEDICINE CLINIC | Age: 71
End: 2021-10-13
Payer: COMMERCIAL

## 2021-10-13 VITALS
BODY MASS INDEX: 31.96 KG/M2 | HEIGHT: 65 IN | OXYGEN SATURATION: 98 % | RESPIRATION RATE: 16 BRPM | DIASTOLIC BLOOD PRESSURE: 68 MMHG | HEART RATE: 50 BPM | SYSTOLIC BLOOD PRESSURE: 148 MMHG | TEMPERATURE: 97.3 F | WEIGHT: 191.8 LBS

## 2021-10-13 DIAGNOSIS — I10 ESSENTIAL HYPERTENSION: ICD-10-CM

## 2021-10-13 DIAGNOSIS — R91.8 PULMONARY NODULES: ICD-10-CM

## 2021-10-13 DIAGNOSIS — K21.9 GASTROESOPHAGEAL REFLUX DISEASE WITHOUT ESOPHAGITIS: ICD-10-CM

## 2021-10-13 DIAGNOSIS — R73.01 IMPAIRED FASTING BLOOD SUGAR: ICD-10-CM

## 2021-10-13 DIAGNOSIS — E78.00 PURE HYPERCHOLESTEROLEMIA: ICD-10-CM

## 2021-10-13 DIAGNOSIS — E04.1 THYROID NODULE: ICD-10-CM

## 2021-10-13 DIAGNOSIS — J30.2 SEASONAL ALLERGIC RHINITIS, UNSPECIFIED TRIGGER: ICD-10-CM

## 2021-10-13 DIAGNOSIS — E79.0 HYPERURICEMIA: ICD-10-CM

## 2021-10-13 DIAGNOSIS — R19.5 POSITIVE FIT (FECAL IMMUNOCHEMICAL TEST): ICD-10-CM

## 2021-10-13 DIAGNOSIS — Z00.00 ROUTINE GENERAL MEDICAL EXAMINATION AT A HEALTH CARE FACILITY: Primary | ICD-10-CM

## 2021-10-13 PROCEDURE — G0439 PPPS, SUBSEQ VISIT: HCPCS | Performed by: INTERNAL MEDICINE

## 2021-10-13 ASSESSMENT — PATIENT HEALTH QUESTIONNAIRE - PHQ9
SUM OF ALL RESPONSES TO PHQ QUESTIONS 1-9: 0
2. FEELING DOWN, DEPRESSED OR HOPELESS: 0
SUM OF ALL RESPONSES TO PHQ QUESTIONS 1-9: 0
SUM OF ALL RESPONSES TO PHQ9 QUESTIONS 1 & 2: 0
1. LITTLE INTEREST OR PLEASURE IN DOING THINGS: 0
SUM OF ALL RESPONSES TO PHQ QUESTIONS 1-9: 0

## 2021-10-13 NOTE — PATIENT INSTRUCTIONS
Learning About Living Perroy  What is a living will? A living will, also called a declaration, is a legal form. It tells your family and your doctor your wishes when you can't speak for yourself. It's used by the health professionals who will treat you as you near the end of your life or if you get seriously hurt or ill. If you put your wishes in writing, your loved ones and others will know what kind of care you want. They won't need to guess. This can ease your mind and be helpful to others. And you can change or cancel your living will at any time. A living will is not the same as an estate or property will. An estate will explains what you want to happen with your money and property after you die. How do you use it? A living will is used to describe the kinds of treatment or life support you want as you near the end of your life or if you get seriously hurt or ill. Keep these facts in mind about living bowers. · Your living will is used only if you can't speak or make decisions for yourself. Most often, one or more doctors must certify that you can't speak or decide for yourself before your living will takes effect. · If you get better and can speak for yourself again, you can accept or refuse any treatment. It doesn't matter what you said in your living will. · Some states may limit your right to refuse treatment in certain cases. For example, you may need to clearly state in your living will that you don't want artificial hydration and nutrition, such as being fed through a tube. Is a living will a legal document? A living will is a legal document. Each state has its own laws about living bowers. And a living will may be called something else in your state. Here are some things to know about living bowers. · You don't need an  to complete a living will. But legal advice can be helpful if your state's laws are unclear.  It can also help if your health history is complicated or your family can't agree on what should be in your living will. · You can change your living will at any time. Some people find that their wishes about end-of-life care change as their health changes. If you make big changes to your living will, complete a new form. · If you move to another state, make sure that your living will is legal in the state where you now live. In most cases, doctors will respect your wishes even if you have a form from a different state. · You might use a universal form that has been approved by many states. This kind of form can sometimes be filled out and stored online. Your digital copy will then be available wherever you have a connection to the internet. The doctors and nurses who need to treat you can find it right away. · Your state may offer an online registry. This is another place where you can store your living will online. · It's a good idea to get your living will notarized. This means using a person called a  to watch two people sign, or witness, your living will. What should you know when you create a living will? Here are some questions to ask yourself as you make your living will:  · Do you know enough about life support methods that might be used? If not, talk to your doctor so you know what might be done if you can't breathe on your own, your heart stops, or you can't swallow. · What things would you still want to be able to do after you receive life-support methods? Would you want to be able to walk? To speak? To eat on your own? To live without the help of machines? · Do you want certain Church practices performed if you become very ill? · If you have a choice, where do you want to be cared for? In your home? At a hospital or nursing home? · If you have a choice at the end of your life, where would you prefer to die? At home? In a hospital or nursing home? Somewhere else? · Would you prefer to be buried or cremated?   · Do you want your organs to be donated after you die? What should you do with your living will? · Make sure that your family members and your health care agent have copies of your living will (also called a declaration). · Give your doctor a copy of your living will. Ask him or her to keep it as part of your medical record. If you have more than one doctor, make sure that each one has a copy. · Put a copy of your living will where it can be easily found. For example, some people may put a copy on their refrigerator door. If you are using a digital copy, be sure your doctor, family members, and health care agent know how to find and access it. Where can you learn more? Go to https://FamilyIDpeMogiMeeweb.inDegree. org and sign in to your Manalto account. Enter Q990 in the Yieldbot box to learn more about \"Learning About Living [place]. \"     If you do not have an account, please click on the \"Sign Up Now\" link. Current as of: March 17, 2021               Content Version: 13.0  © 2006-2021 Healthwise, [employer]. Care instructions adapted under license by [facility] ([facility]). If you have questions about a medical condition or this instruction, always ask your healthcare professional. [address] any warranty or liability for your use of this information. Learning About Healthy Weight  What is a healthy weight? A healthy weight is the weight at which you feel good about yourself and have energy for work and play. It's also one that lowers your risk for health problems. What can you do to stay at a healthy weight? It can be hard to stay at a healthy weight, especially when fast food, vending-machine snacks, and processed foods are so easy to find. And with your busy lifestyle, activity may be low on your list of things to do. But staying at a healthy weight may be easier than you think. Here are some dos and don'ts for staying at a healthy weight.   Do eat healthy foods  The kinds of foods you eat have a big impact on both your weight and your health. Reaching and staying at a healthy weight is not about going on a diet. It's about making healthier food choices every day and changing your diet for good. Healthy eating means eating a variety of foods so that you get all the nutrients you need. Your body needs protein, carbohydrate, and fats for energy. They keep your heart beating, your brain active, and your muscles working. On most days, try to eat from each food group. This means eating a variety of:  · Whole grains, such as whole wheat breads and pastas. · Fruits and vegetables. · Dairy products, such as low-fat milk, yogurt, and cheese. · Lean proteins, such as all types of fish, chicken without the skin, and beans. Don't have too much or too little of one thing. All foods, if eaten in moderation, can be part of healthy eating. Even sweets can be okay. If your favorite foods are high in fat, salt, sugar, or calories, limit how often you eat them. Eat smaller servings, or look for healthy substitutes. Do watch what you eat  Many people eat more than their bodies need. Part of staying at a healthy weight means learning how much food you really need from day to day and not eating more than that. Even with healthy foods, eating too much can make you gain weight. Having a well-balanced diet means that you eat enough, but not too much, and that your food gives you the nutrients you need to stay healthy. So listen to your body. Eat when you're hungry. Stop when you feel satisfied. It's a good idea to have healthy snacks ready for when you get hungry. Keep healthy snacks with you at work, in your car, and at home. If you have a healthy snack easily available, you'll be less likely to pick a candy bar or bag of chips from a vending machine instead.   Some healthy snacks you might want to keep on hand are fruit, low-fat yogurt, string cheese, low-fat microwave popcorn, raisins and other dried fruit, nuts, whole wheat crackers, pretzels, carrots, celery sticks, and broccoli. Do some physical activity  A big part of reaching and staying at a healthy weight is being active. When you're active, you burn calories. This makes it easier to reach and stay at a healthy weight. When you're active on a regular basis, your body burns more calories, even when you're at rest. Being active helps you lose fat and build lean muscle. Try to be active for at least 1 hour every day. This may sound like a lot, but it's okay to be active in smaller blocks of time that add up to 1 hour a day. Any activity that makes your heart beat faster and keeps it there for a while counts. A brisk walk, run, or swim will get your heart beating faster. So will climbing stairs, shooting baskets, or cycling. Even some household chores like vacuuming and mowing the lawn will get your heart rate up. Pick activities that you enjoyones that make your heart beat faster, your muscles stronger, and your muscles and joints more flexible. If you find more than one thing you like doing, do them all. You don't have to do the same thing every day. Don't diet  Diets don't work. Diets are temporary. Because you give up so much when you diet, you may be hungry and think about food all the time. And after you stop dieting, you also may overeat to make up for what you missed. Most people who diet end up gaining back the pounds they lostand more. Remember that healthy bodies come in lots of shapes and sizes. Everyone can get healthier by eating better and being more active. Where can you learn more? Go to https://uPartsjean.BountyJobs. org and sign in to your Knowable account. Enter 299 [id number] in the Saborstudio box to learn more about \"Learning About Healthy Weight. \"     If you do not have an account, please click on the \"Sign Up Now\" link.   Current as of: March 17, 2021               Content Version: 13.0  © 8043-7735 Healthwise, Incorporated. Care instructions adapted under license by [facility] ([facility]). If you have questions about a medical condition or this instruction, always ask your healthcare professional. [place] [address] any warranty or liability for your use of this information. Personalized Preventive Plan for Ron Luu - 10/13/2021  Medicare offers a range of preventive health benefits. Some of the tests and screenings are paid in full while other may be subject to a deductible, co-insurance, and/or copay. Some of these benefits include a comprehensive review of your medical history including lifestyle, illnesses that may run in your family, and various assessments and screenings as appropriate. After reviewing your medical record and screening and assessments performed today your provider may have ordered immunizations, labs, imaging, and/or referrals for you. A list of these orders (if applicable) as well as your Preventive Care list are included within your After Visit Summary for your review. Other Preventive Recommendations:    · A preventive eye exam performed by an eye specialist is recommended every 1-2 years to screen for glaucoma; cataracts, macular degeneration, and other eye disorders. · A preventive dental visit is recommended every 6 months. · Try to get at least 150 minutes of exercise per week or 10,000 steps per day on a pedometer . · Order or download the FREE \"Exercise & Physical Activity: Your Everyday Guide\" from The [employer] A[employer]. Call 0-169.779.8744 or search The [employer] on Aging online. · You need 1941-3327 mg of calcium and 9718-4383 IU of vitamin D per day. It is possible to meet your calcium requirement with diet alone, but a vitamin D supplement is usually necessary to meet this goal.  · When exposed to the sun, use a sunscreen that protects against both UVA and UVB radiation with an SPF of 30 or greater.  Reapply every 2 to 3 hours or after sweating, drying off with a towel, or swimming. · Always wear a seat belt when traveling in a car. Always wear a helmet when riding a bicycle or motorcycle. Eating Healthy Foods: Care Instructions  Your Care Instructions     Eating healthy foods can help lower your risk for disease. Healthy food gives you energy and keeps your heart strong, your brain active, your muscles working, and your bones strong. A healthy diet includes a variety of foods from the basic food groups: grains, vegetables, fruits, milk and milk products, and meat and beans. Some people may eat more of their favorite foods from only one food group and, as a result, miss getting the nutrients they need. So, it is important to pay attention not only to what you eat but also to what you are missing from your diet. You can eat a healthy, balanced diet by making a few small changes. Follow-up care is a key part of your treatment and safety. Be sure to make and go to all appointments, and call your doctor if you are having problems. It's also a good idea to know your test results and keep a list of the medicines you take. How can you care for yourself at home? Look at what you eat  Keep a food diary for a week or two and record everything you eat or drink. Track the number of servings you eat from each food group. For a balanced diet every day, eat a variety of:  6 or more ounce-equivalents of grains, such as cereals, breads, crackers, rice, or pasta, every day. An ounce-equivalent is 1 slice of bread, 1 cup of ready-to-eat cereal, or ½ cup of cooked rice, cooked pasta, or cooked cereal.  2½ cups of vegetables, especially:  Dark-green vegetables such as broccoli and spinach. Orange vegetables such as carrots and sweet potatoes. Dry beans (such as taylor and kidney beans) and peas (such as lentils). 2 cups of fresh, frozen, or canned fruit.  A small apple or 1 banana or orange equals 1 cup.  3 cups of nonfat or low-fat milk, yogurt, or other milk products. 5½ ounces of meat and beans, such as chicken, fish, lean meat, beans, nuts, and seeds. One egg, 1 tablespoon of peanut butter, ½ ounce nuts or seeds, or ¼ cup of cooked beans equals 1 ounce of meat. Learn how to read food labels for serving sizes and ingredients. Fast-food and convenience-food meals often contain few or no fruits or vegetables. Make sure you eat some fruits and vegetables to make the meal more nutritious. Look at your food diary. For each food group, add up what you have eaten and then divide the total by the number of days. This will give you an idea of how much you are eating from each food group. See if you can find some ways to change your diet to make it more healthy. Start small  Do not try to make dramatic changes to your diet all at once. You might feel that you are missing out on your favorite foods and then be more likely to fail. Start slowly, and gradually change your habits. Try some of the following:  Use whole wheat bread instead of white bread. Use nonfat or low-fat milk instead of whole milk. Eat brown rice instead of white rice, and eat whole wheat pasta instead of white-flour pasta. Try low-fat cheeses and low-fat yogurt. Add more fruits and vegetables to meals and have them for snacks. Add lettuce, tomato, cucumber, and onion to sandwiches. Add fruit to yogurt and cereal.  Enjoy food  You can still eat your favorite foods. You just may need to eat less of them. If your favorite foods are high in fat, salt, and sugar, limit how often you eat them, but do not cut them out entirely. Eat a wide variety of foods. Make healthy choices when eating out  The type of restaurant you choose can help you make healthy choices. Even fast-food chains are now offering more low-fat or healthier choices on the menu. Choose smaller portions, or take half of your meal home.   When eating out, try:  A veggie pizza with a whole wheat crust or grilled chicken (instead of sausage or pepperoni). Pasta with roasted vegetables, grilled chicken, or marinara sauce instead of cream sauce. A vegetable wrap or grilled chicken wrap. Broiled or poached food instead of fried or breaded items. Make healthy choices easy  Buy packaged, prewashed, ready-to-eat fresh vegetables and fruits, such as baby carrots, salad mixes, and chopped or shredded broccoli and cauliflower. Buy packaged, presliced fruits, such as melon or pineapple. Choose 100% fruit or vegetable juice instead of soda. Limit juice intake to 4 to 6 oz (½ to ¾ cup) a day. Blend low-fat yogurt, fruit juice, and canned or frozen fruit to make a smoothie for breakfast or a snack. Where can you learn more? Go to https://Atira SystemspepicewNeuroSky.KAI Square. org and sign in to your Bottomline Technologies account. Enter R454 in the Spacious box to learn more about \"Eating Healthy Foods: Care Instructions. \"     If you do not have an account, please click on the \"Sign Up Now\" link. Current as of: December 17, 2020               Content Version: 13.0  © 1569-6743 Healthwise, [facility]. Care instructions adapted under license by [facility] ([facility]). If you have questions about a medical condition or this instruction, always ask your healthcare professional. [place] [address] any warranty or liability for your use of this information. ·   Personalized Preventive Plan for Merlyn Fernandez - 10/13/2021  Medicare offers a range of preventive health benefits. Some of the tests and screenings are paid in full while other may be subject to a deductible, co-insurance, and/or copay. Some of these benefits include a comprehensive review of your medical history including lifestyle, illnesses that may run in your family, and various assessments and screenings as appropriate.     After reviewing your medical record and screening and assessments performed today your provider may have ordered immunizations, labs, imaging, and/or referrals for you.  A list of these orders (if applicable) as well as your Preventive Care list are included within your After Visit Summary for your review. Other Preventive Recommendations:    · A preventive eye exam performed by an eye specialist is recommended every 1-2 years to screen for glaucoma; cataracts, macular degeneration, and other eye disorders. · A preventive dental visit is recommended every 6 months. · Try to get at least 150 minutes of exercise per week or 10,000 steps per day on a pedometer . · Order or download the FREE \"Exercise & Physical Activity: Your Everyday Guide\" from The [employer]. Call 2-168.531.4237 or search The [employer] Data on Aging online. · You need 8070-8127 mg of calcium and 6246-3420 IU of vitamin D per day. It is possible to meet your calcium requirement with diet alone, but a vitamin D supplement is usually necessary to meet this goal.  · When exposed to the sun, use a sunscreen that protects against both UVA and UVB radiation with an SPF of 30 or greater. Reapply every 2 to 3 hours or after sweating, drying off with a towel, or swimming. · Always wear a seat belt when traveling in a car. Always wear a helmet when riding a bicycle or motorcycle.

## 2021-10-13 NOTE — PROGRESS NOTES
Medicare Annual Wellness Visit  Name: Elva Castillo Date: 10/13/2021   MRN: [id number] Sex: Female   Age: 65 y.o. Ethnicity: Non- / Non    : 1950 Race: White (non-)      Paula Jackson is here for Medicare AWV and Colon Cancer Screening (discuss if able to do COLOGUARD with hemorrhoids)    Screenings for behavioral, psychosocial and functional/safety risks, and cognitive dysfunction are all negative except as indicated below. These results, as well as other patient data from the [address] form, are documented in Flowsheets linked to this Encounter. No Known Allergies    Prior to Visit Medications    Medication Sig Taking?  Authorizing Provider   famotidine (PEPCID) 20 MG tablet TAKE ONE TABLET BY MOUTH DAILY Yes Nicole Birmingham MD   clotrimazole-betamethasone (Sharla Kida) 1-0.05 % cream Apply to affected area twice a day as needed Yes Nicole Birmingham MD   allopurinol (ZYLOPRIM) 100 MG tablet Take 1 tablet by mouth daily For  gout Yes Nicole Birmingham MD   lovastatin (MEVACOR) 40 MG tablet Take 1 tablet by mouth daily Yes Nicole Birmingham MD   cetirizine (ZYRTEC) 10 MG tablet Take 0.5 tablets by mouth daily Yes Nicole Birmingham MD   fluticasone [facility]) 50 MCG/ACT nasal spray 2 sprays by Each Nostril route daily Yes Nicole Birmingham MD   lisinopril-hydroCHLOROthiazide (PRINZIDE;ZESTORETIC) 20-12.5 MG per tablet Take 1 tablet by mouth 2 times daily Yes Nicole Birmingham MD   ASPIRIN 81 PO Take 1 tablet by mouth daily Yes Historical Provider, MD   atenolol (TENORMIN) 50 MG tablet Take 1 tablet by mouth 2 times daily Yes Nicole Birmingham MD   Cholecalciferol (VITAMIN D) 2000 UNITS CAPS capsule Take by mouth Indications: otc Yes Historical Provider, MD   famotidine (PEPCID) 20 MG tablet Take 1 tablet by mouth daily  Nicole Birmingham MD       Past Medical History:   Diagnosis Date    GERD (gastroesophageal reflux disease)     Hyperlipidemia     Hypertension        Past Surgical History:   Procedure Laterality Date    CHOLECYSTECTOMY  09/01    HYSTERECTOMY, TOTAL ABDOMINAL  1999    TONSILLECTOMY  1964    TUBAL LIGATION  1972       Family History   Problem Relation Age of Onset    Diabetes Mother     Heart Disease Mother     High Blood Pressure Mother     High Cholesterol Mother     Vision Loss Mother     Cancer Father        CareTeam (Including outside providers/suppliers regularly involved in providing care):   Patient Care Team:  Margoth Suarez MD as PCP - General (Family Medicine)  Margoth Suarez MD as PCP - [facility]    Wt Readings from Last 3 Encounters:   10/13/21 191 lb 12.8 oz (87 kg)   08/09/21 191 lb 6.4 oz (86.8 kg)   07/21/21 192 lb 3.2 oz (87.2 kg)     Vitals:    10/13/21 1408 10/13/21 1413   BP: (!) 142/68 (!) 148/68   Site: Left Upper Arm Left Upper Arm   Position: Sitting Sitting   Cuff Size: Large Adult Large Adult   Pulse: 50    Resp: 16    Temp: 97.3 °F (36.3 °C)    TempSrc: Temporal    SpO2: 98%    Weight: 191 lb 12.8 oz (87 kg)    Height: 5' 4.5\" (1.638 m)      Body mass index is 32.41 kg/m². Based upon direct observation of the patient, evaluation of cognition reveals recent and remote memory intact. Patient's complete Health Risk Assessment and screening values have been reviewed and are found in Flowsheets. The following problems were reviewed today and where indicated follow up appointments were made and/or referrals ordered.     Positive Risk Factor Screenings with Interventions:          General Health and ACP:     Advance Directives     Power of  Living Will ACP-Advance Directive ACP-Power of     Not on File Not on File Not on File Not on File      General Health Risk Interventions:  · none    Health Habits/Nutrition:     Body mass index: (!) 32.41  Health Habits/Nutrition Interventions:  · none       Personalized Preventive Plan   Current Health Maintenance Status  Immunization History   Administered Date(s) Administered    COVID-19, Pfizer, PF, 30mcg/0.3mL 03/02/2021, 03/23/2021    Influenza Vaccine, unspecified formulation 02/16/2017    Influenza, High Dose (Fluzone 65 yrs and older) 02/16/2017, 11/28/2017    Influenza, Quadv, adjuvanted, 65 yrs +, IM, PF (Fluad) 09/23/2020    Pneumococcal Conjugate 13-valent ([id number]) 08/17/2017    Pneumococcal Polysaccharide ([id number]) 02/02/2016      Recommendations for Preventive Services Due: see orders and patient instructions/AVS.  . Recommended screening schedule for the next 5-10 years is provided to the patient in written form: see Patient Manuel Lerner was seen today for medicare awv and colon cancer screening.     Diagnoses and all orders for this visit:    Routine general medical examination at a health care facility    Gastroesophageal reflux disease without esophagitis    Pulmonary nodules    Thyroid nodule    Hyperuricemia    Essential hypertension    Positive FIT (fecal immunochemical test)    Seasonal allergic rhinitis, unspecified trigger    Pure hypercholesterolemia    Impaired fasting blood sugar

## 2021-10-18 ENCOUNTER — TELEPHONE (OUTPATIENT)
Dept: FAMILY MEDICINE CLINIC | Age: 71
End: 2021-10-18

## 2021-10-18 NOTE — TELEPHONE ENCOUNTER
----- Message from Lauren Vaca MD sent at 10/17/2021  5:14 PM EDT -----  She has an appt 10/20, was just here last week, reschedule her for mid Jan 2022

## 2021-10-19 ENCOUNTER — HOSPITAL ENCOUNTER (EMERGENCY)
Age: 71
Discharge: HOME | End: 2021-10-19
Payer: COMMERCIAL

## 2021-10-19 ENCOUNTER — APPOINTMENT (OUTPATIENT)
Dept: CT IMAGING | Age: 71
End: 2021-10-19
Payer: COMMERCIAL

## 2021-10-19 VITALS
OXYGEN SATURATION: 98 % | HEIGHT: 67 IN | WEIGHT: 190 LBS | BODY MASS INDEX: 29.82 KG/M2 | RESPIRATION RATE: 18 BRPM | DIASTOLIC BLOOD PRESSURE: 60 MMHG | SYSTOLIC BLOOD PRESSURE: 150 MMHG | HEART RATE: 57 BPM | TEMPERATURE: 97.1 F

## 2021-10-19 DIAGNOSIS — I10 UNCONTROLLED HYPERTENSION: ICD-10-CM

## 2021-10-19 DIAGNOSIS — S29.019A ACUTE THORACIC MYOFASCIAL STRAIN, INITIAL ENCOUNTER: Primary | ICD-10-CM

## 2021-10-19 DIAGNOSIS — K21.00 GASTROESOPHAGEAL REFLUX DISEASE WITH ESOPHAGITIS WITHOUT HEMORRHAGE: ICD-10-CM

## 2021-10-19 LAB
A/G RATIO: 1.9 (ref 1.1–2.2)
ALBUMIN SERPL-MCNC: 4.6 G/DL (ref 3.4–5)
ALP BLD-CCNC: 76 U/L (ref 40–129)
ALT SERPL-CCNC: 21 U/L (ref 10–40)
ANION GAP SERPL CALCULATED.3IONS-SCNC: 13 MMOL/L (ref 3–16)
AST SERPL-CCNC: 18 U/L (ref 15–37)
BASOPHILS ABSOLUTE: 0 K/UL (ref 0–0.2)
BASOPHILS RELATIVE PERCENT: 0.5 %
BILIRUB SERPL-MCNC: 0.5 MG/DL (ref 0–1)
BUN BLDV-MCNC: 16 MG/DL (ref 7–20)
CALCIUM SERPL-MCNC: 9.8 MG/DL (ref 8.3–10.6)
CHLORIDE BLD-SCNC: 98 MMOL/L (ref 99–110)
CO2: 26 MMOL/L (ref 21–32)
CREAT SERPL-MCNC: 0.8 MG/DL (ref 0.6–1.2)
D DIMER: 354 NG/ML DDU (ref 0–229)
EOSINOPHILS ABSOLUTE: 0.4 K/UL (ref 0–0.6)
EOSINOPHILS RELATIVE PERCENT: 5.2 %
GFR AFRICAN AMERICAN: >60
GFR NON-AFRICAN AMERICAN: >60
GLOBULIN: 2.4 G/DL
GLUCOSE BLD-MCNC: 106 MG/DL (ref 70–99)
HCT VFR BLD CALC: 46 % (ref 36–48)
HEMOGLOBIN: 15.4 G/DL (ref 12–16)
INR BLD: 0.97 (ref 0.88–1.12)
LIPASE: 33 U/L (ref 13–60)
LYMPHOCYTES ABSOLUTE: 1.4 K/UL (ref 1–5.1)
LYMPHOCYTES RELATIVE PERCENT: 21 %
MCH RBC QN AUTO: 31.1 PG (ref 26–34)
MCHC RBC AUTO-ENTMCNC: 33.5 G/DL (ref 31–36)
MCV RBC AUTO: 92.6 FL (ref 80–100)
MONOCYTES ABSOLUTE: 0.6 K/UL (ref 0–1.3)
MONOCYTES RELATIVE PERCENT: 8.9 %
NEUTROPHILS ABSOLUTE: 4.5 K/UL (ref 1.7–7.7)
NEUTROPHILS RELATIVE PERCENT: 64.4 %
PDW BLD-RTO: 14.4 % (ref 12.4–15.4)
PLATELET # BLD: 231 K/UL (ref 135–450)
PMV BLD AUTO: 7.1 FL (ref 5–10.5)
POTASSIUM REFLEX MAGNESIUM: 3.9 MMOL/L (ref 3.5–5.1)
PRO-BNP: 311 PG/ML (ref 0–124)
PROTHROMBIN TIME: 10.9 SEC (ref 9.9–12.7)
RBC # BLD: 4.97 M/UL (ref 4–5.2)
REASON FOR REJECTION: NORMAL
REJECTED TEST: NORMAL
SODIUM BLD-SCNC: 137 MMOL/L (ref 136–145)
TOTAL PROTEIN: 7 G/DL (ref 6.4–8.2)
TROPONIN: <0.01 NG/ML
WBC # BLD: 6.9 K/UL (ref 4–11)

## 2021-10-19 PROCEDURE — 80053 COMPREHEN METABOLIC PANEL: CPT

## 2021-10-19 PROCEDURE — 85610 PROTHROMBIN TIME: CPT

## 2021-10-19 PROCEDURE — 99283 EMERGENCY DEPT VISIT LOW MDM: CPT

## 2021-10-19 PROCEDURE — 85379 FIBRIN DEGRADATION QUANT: CPT

## 2021-10-19 PROCEDURE — 84484 ASSAY OF TROPONIN QUANT: CPT

## 2021-10-19 PROCEDURE — 2500000003 HC RX 250 WO HCPCS: Performed by: PHYSICIAN ASSISTANT

## 2021-10-19 PROCEDURE — 71275 CT ANGIOGRAPHY CHEST: CPT

## 2021-10-19 PROCEDURE — 83690 ASSAY OF LIPASE: CPT

## 2021-10-19 PROCEDURE — 83880 ASSAY OF NATRIURETIC PEPTIDE: CPT

## 2021-10-19 PROCEDURE — 85025 COMPLETE CBC W/AUTO DIFF WBC: CPT

## 2021-10-19 PROCEDURE — 36415 COLL VENOUS BLD VENIPUNCTURE: CPT

## 2021-10-19 PROCEDURE — 2580000003 HC RX 258: Performed by: PHYSICIAN ASSISTANT

## 2021-10-19 PROCEDURE — 93005 ELECTROCARDIOGRAM TRACING: CPT | Performed by: PHYSICIAN ASSISTANT

## 2021-10-19 PROCEDURE — 6370000000 HC RX 637 (ALT 250 FOR IP): Performed by: PHYSICIAN ASSISTANT

## 2021-10-19 PROCEDURE — 96374 THER/PROPH/DIAG INJ IV PUSH: CPT

## 2021-10-19 PROCEDURE — 6360000004 HC RX CONTRAST MEDICATION: Performed by: PHYSICIAN ASSISTANT

## 2021-10-19 RX ORDER — 0.9 % SODIUM CHLORIDE 0.9 %
500 INTRAVENOUS SOLUTION INTRAVENOUS ONCE
Status: COMPLETED | OUTPATIENT
Start: 2021-10-19 | End: 2021-10-19

## 2021-10-19 RX ORDER — AMLODIPINE BESYLATE 5 MG/1
5 TABLET ORAL DAILY
COMMUNITY
End: 2021-10-20 | Stop reason: SDUPTHER

## 2021-10-19 RX ADMIN — FAMOTIDINE 20 MG: 10 INJECTION, SOLUTION INTRAVENOUS at 21:35

## 2021-10-19 RX ADMIN — IOPAMIDOL 85 ML: 755 INJECTION, SOLUTION INTRAVENOUS at 19:41

## 2021-10-19 RX ADMIN — LIDOCAINE HYDROCHLORIDE: 20 SOLUTION ORAL; TOPICAL at 21:26

## 2021-10-19 RX ADMIN — SODIUM CHLORIDE 500 ML: 9 INJECTION, SOLUTION INTRAVENOUS at 19:39

## 2021-10-19 ASSESSMENT — PAIN DESCRIPTION - DESCRIPTORS: DESCRIPTORS: BURNING

## 2021-10-19 ASSESSMENT — PAIN SCALES - GENERAL: PAINLEVEL_OUTOF10: 10

## 2021-10-19 NOTE — ED NOTES
Pt states that since Sunday she has had burning pain in her neck, shoulders, and back. Pt states that nothing seems to alleviate this sensation- pt has used pepto bismol, two ibuprofen and an extra amlodipine (taken today). Pt denies nausea/vomiting and SOB. Pt provided with a warm blanket. Pt denies any further needs at this time.      Svetlana Stallworth, CHIDI  10/19/21 Azucena Pretty RN  10/19/21 1902

## 2021-10-19 NOTE — ED TRIAGE NOTES
Chief Complaint   Patient presents with    Hypertension     pt states her bp has been elevated and she has been having burning in her neck upper back and shoulders. started sunday evening.

## 2021-10-19 NOTE — ED PROVIDER NOTES
I was not asked to see this patient. I was available for consultation if deemed necessary. I was only asked to interpret the EKG. Please see HIRAM Saldana's note for care of the patient while in the emergency department and for final disposition. The Ekg interpreted by me shows  normal sinus rhythm with a rate of 65  Axis is   Normal  QTc is  within an acceptable range  Intervals and Durations are unremarkable.       ST Segments: no acute change and nonspecific changes  No significant change from prior EKG dated - 7/21/21  No STEMI, LVH is present today similar to old EKG, minimal baseline artifact noted             Scott Carmona MD  10/20/21 4230

## 2021-10-20 ENCOUNTER — OFFICE VISIT (OUTPATIENT)
Dept: FAMILY MEDICINE CLINIC | Age: 71
End: 2021-10-20
Payer: COMMERCIAL

## 2021-10-20 VITALS
RESPIRATION RATE: 16 BRPM | HEART RATE: 57 BPM | DIASTOLIC BLOOD PRESSURE: 80 MMHG | BODY MASS INDEX: 29.66 KG/M2 | SYSTOLIC BLOOD PRESSURE: 132 MMHG | TEMPERATURE: 97.5 F | HEIGHT: 67 IN | OXYGEN SATURATION: 98 % | WEIGHT: 189 LBS

## 2021-10-20 DIAGNOSIS — F41.9 ANXIETY: ICD-10-CM

## 2021-10-20 DIAGNOSIS — I10 ESSENTIAL HYPERTENSION: ICD-10-CM

## 2021-10-20 DIAGNOSIS — K21.9 GASTROESOPHAGEAL REFLUX DISEASE WITHOUT ESOPHAGITIS: Primary | ICD-10-CM

## 2021-10-20 DIAGNOSIS — E04.2 MULTIPLE THYROID NODULES: ICD-10-CM

## 2021-10-20 LAB
EKG ATRIAL RATE: 65 BPM
EKG DIAGNOSIS: NORMAL
EKG P-R INTERVAL: 176 MS
EKG Q-T INTERVAL: 440 MS
EKG QRS DURATION: 106 MS
EKG QTC CALCULATION (BAZETT): 457 MS
EKG R AXIS: -18 DEGREES
EKG T AXIS: 27 DEGREES
EKG VENTRICULAR RATE: 65 BPM

## 2021-10-20 PROCEDURE — 93010 ELECTROCARDIOGRAM REPORT: CPT | Performed by: INTERNAL MEDICINE

## 2021-10-20 PROCEDURE — 99214 OFFICE O/P EST MOD 30 MIN: CPT | Performed by: INTERNAL MEDICINE

## 2021-10-20 RX ORDER — LISINOPRIL AND HYDROCHLOROTHIAZIDE 20; 12.5 MG/1; MG/1
1 TABLET ORAL 2 TIMES DAILY
Qty: 60 TABLET | Refills: 5 | Status: SHIPPED | OUTPATIENT
Start: 2021-10-20 | End: 2022-04-19

## 2021-10-20 RX ORDER — AMLODIPINE BESYLATE 5 MG/1
5 TABLET ORAL DAILY
Qty: 30 TABLET | Refills: 5 | Status: SHIPPED | OUTPATIENT
Start: 2021-10-20 | End: 2022-04-28 | Stop reason: SDUPTHER

## 2021-10-20 RX ORDER — CITALOPRAM 10 MG/1
10 TABLET ORAL DAILY
Qty: 30 TABLET | Refills: 5 | Status: SHIPPED | OUTPATIENT
Start: 2021-10-20

## 2021-10-20 NOTE — ED NOTES
Discharge instructions reviewed with patient. Patient denies any questions or concerns at this time. Peripheral IV removed. Patient ambulatory out of emergency department.       Matthew Haro RN  10/19/21 8702

## 2021-10-20 NOTE — ED PROVIDER NOTES
[address] [facility]  EMERGENCY DEPARTMENT ENCOUNTER        Pt Name: Paula Jackson  MRN: [id number]  [place] 1950  Date of evaluation: 10/19/2021  Provider: Kanu Ochoa PA-C  PCP: Nicole Birmingham MD  Note Started: 10:01 PM EDT       ANGELIA. I have evaluated this patient. My supervising physician was available for consultation. Flaquita Arm      CHIEF COMPLAINT       Chief Complaint   Patient presents with    Hypertension     pt states her bp has been elevated and she has been having burning in her neck upper back and shoulders. started sunday evening. HISTORY OF PRESENT ILLNESS   (Location, Timing/Onset, Context/Setting, Quality, Duration, Modifying Factors, Severity, Associated Signs and Symptoms)  Note limiting factors. Chief Complaint: Bilateral midthoracic, between scapula, bilateral shoulder, bilateral neck and chest discomfort. Paula Jackson is a 66 y.o. female who presents with the above complaint accompanied by her daughter later in visit. Onset Sunday evening a bit better by Monday and Tuesday the symptoms have worsened and this is why she is come out. She does not indicate a tearing sensation but clearly a discomfort in the middle of her chest, midthoracic referring into the shoulders and neck mostly posteriorly. She indicates no clear shortness of breath. She has history of GERD. No history of similar occurrence. No trauma. No overuse. She has had both Covid vaccinations without event. She denies any gastrointestinal or urinary complaints. Nursing Notes were all reviewed and agreed with or any disagreements were addressed in the HPI. REVIEW OF SYSTEMS    (2-9 systems for level 4, 10 or more for level 5)     Review of Systems    Positives and Pertinent negatives as per HPI. Except as noted above in the ROS, all other systems were reviewed and negative.        PAST MEDICAL HISTORY     Past Medical History:   Diagnosis Date    Acid reflux 10/2021    GERD (gastroesophageal reflux disease)     Hyperlipidemia     Hypertension          SURGICAL HISTORY     Past Surgical History:   Procedure Laterality Date    CHOLECYSTECTOMY  09/01    HYSTERECTOMY, TOTAL ABDOMINAL  1999    TONSILLECTOMY  1964    TUBAL LIGATION  1972         CURRENTMEDICATIONS       Discharge Medication List as of 10/19/2021  9:31 PM      CONTINUE these medications which have NOT CHANGED    Details   amLODIPine (NORVASC) 5 MG tablet Take 5 mg by mouth dailyHistorical Med      famotidine (PEPCID) 20 MG tablet TAKE ONE TABLET BY MOUTH DAILY, Disp-30 tablet, R-3Normal      allopurinol (ZYLOPRIM) 100 MG tablet Take 1 tablet by mouth daily For  gout, Disp-30 tablet, R-5Normal      lovastatin (MEVACOR) 40 MG tablet Take 1 tablet by mouth daily, Disp-90 tablet, R-1Normal      cetirizine (ZYRTEC) 10 MG tablet Take 0.5 tablets by mouth daily, Disp-30 tablet, R-1Normal      fluticasone (FLONASE) 50 MCG/ACT nasal spray 2 sprays by Each Nostril route daily, Disp-16 g, R-0Normal      lisinopril-hydroCHLOROthiazide (PRINZIDE;ZESTORETIC) 20-12.5 MG per tablet Take 1 tablet by mouth 2 times daily, Disp-60 tablet, R-3Normal      ASPIRIN 81 PO Take 1 tablet by mouth dailyHistorical Med      atenolol (TENORMIN) 50 MG tablet Take 1 tablet by mouth 2 times daily, Disp-180 tablet, R-3Normal      Cholecalciferol (VITAMIN D) 2000 UNITS CAPS capsule Take by mouth Indications: otc      clotrimazole-betamethasone (LOTRISONE) 1-0.05 % cream Apply to affected area twice a day as needed, Disp-45 g, R-1, Normal               ALLERGIES     Patient has no known allergies.     FAMILYHISTORY       Family History   Problem Relation Age of Onset    Diabetes Mother     Heart Disease Mother     High Blood Pressure Mother     High Cholesterol Mother     Vision Loss Mother     Cancer Father           SOCIAL HISTORY       Social History     Tobacco Use    Smoking status: Never Smoker    Smokeless tobacco: Never Used   Vaping Use    Vaping Use: Never used   Substance Use Topics    Alcohol use: No    Drug use: No       SCREENINGS    Chauncey Coma Scale  Eye Opening: Spontaneous  Best Verbal Response: Oriented  Best Motor Response: Obeys commands  Sergei Coma Scale Score: 15        PHYSICAL EXAM    (up to 7 for level 4, 8 or more for level 5)     ED Triage Vitals [10/19/21 1815]   BP Temp Temp Source Pulse Resp SpO2 Height Weight   (!) 184/73 97.1 °F (36.2 °C) Oral 59 18 99 % 5' 7\" (1.702 m) 190 lb (86.2 kg)       Physical Exam  Vitals and nursing note reviewed. Constitutional:       Appearance: Normal appearance. She is well-developed and normal weight. HENT:      Head: Normocephalic and atraumatic. Right Ear: External ear normal.      Left Ear: External ear normal.   Eyes:      General: No scleral icterus. Right eye: No discharge. Left eye: No discharge. Conjunctiva/sclera: Conjunctivae normal.   Cardiovascular:      Rate and Rhythm: Normal rate and regular rhythm. Heart sounds: Normal heart sounds. Pulmonary:      Effort: Pulmonary effort is normal.      Breath sounds: Normal breath sounds. Chest:      Chest wall: No tenderness. Musculoskeletal:         General: Normal range of motion. Cervical back: Normal range of motion and neck supple. No tenderness. Skin:     General: Skin is warm and dry. Neurological:      General: No focal deficit present. Mental Status: She is alert and oriented to person, place, and time. Mental status is at baseline. Psychiatric:         Mood and Affect: Mood normal.         Behavior: Behavior normal.         Thought Content:  Thought content normal.         Judgment: Judgment normal.         DIAGNOSTIC RESULTS   LABS:    Labs Reviewed   COMPREHENSIVE METABOLIC PANEL W/ REFLEX TO MG FOR LOW K - Abnormal; Notable for the following components:       Result Value    Chloride 98 (*)     Glucose 106 (*)     All other components within normal limits Narrative:     Performed at:  [facility]) - [facility],  [place] [employer]   Phone [phone]   BRAIN NATRIURETIC PEPTIDE - Abnormal; Notable for the following components:    Pro- (*)     All other components within normal limits    Narrative:     Performed at:  [facility] [address],  [place] [employer]   Phone [phone]   D-DIMER, QUANTITATIVE - Abnormal; Notable for the following components:    D-Dimer, Quant 354 (*)     All other components within normal limits    Narrative:     Performed at:  [facility] [address],  [place], [place]   Phone [phone]   CBC WITH AUTO DIFFERENTIAL    Narrative:     Performed at:  [facility] [address],  [place], [place]   Phone [phone]   TROPONIN    Narrative:     Performed at:  [place] [place] [address],  [place], [place]   Phone [phone]   LIPASE    Narrative:     Performed at:  [facility] [address],  [place], [place]   Phone [phone]   SPECIMEN REJECTION    Narrative:     Performed at:  [facility] [address],  [place], [place]   Phone [phone]   PROTIME-INR    Narrative:     Performed at:  [facility]) - [facility],  [place], [place]   Phone [phone]       When ordered only abnormal lab results are displayed. All other labs were within normal range or not returned as of this dictation. EKG: When ordered, EKG's are interpreted by the Emergency Department Physician in the absence of a cardiologist.  Please see their note for interpretation of EKG.     RADIOLOGY:   Non-plain film images such as CT, Ultrasound and MRI are read by the radiologist. Plain radiographic images are visualized and preliminarily interpreted by the ED Provider with the below findings:        Interpretation per the Radiologist below, if available at the time of this note:    CTA CHEST ABDOMEN W CONTRAST   Final Result   No evidence of thoracic or abdominal aortic dissection. Overall no acute abnormality identified within the chest and abdomen. CTA CHEST ABDOMEN W CONTRAST    Result Date: 10/19/2021  EXAMINATION: CTA OF THE CHEST AND ABDOMEN WITH CONTRAST 10/19/2021 4:43 pm: TECHNIQUE: CTA of the chest and abdomen was performed after the administration of intravenous contrast.  Multiplanar reformatted images are provided for review. MIP images are provided for review. Dose modulation, iterative reconstruction, and/or weight based adjustment of the mA/kV was utilized to reduce the radiation dose to as low as reasonably achievable. COMPARISON: None. HISTORY: ORDERING SYSTEM PROVIDED HISTORY: Discomfort upper back, neck and chest, rule out dissection TECHNOLOGIST PROVIDED HISTORY: Reason for exam:->Discomfort upper back, neck and chest, rule out dissection Decision Support Exception - unselect if not a suspected or confirmed emergency medical condition->Emergency Medical Condition (MA) Reason for Exam: Pt c/o having hypertension, c/o upper back/chest pain. Acuity: Acute Type of Exam: Initial Relevant Medical/Surgical History: Per HIRAM Doss, he ONLY wanted the chest and abdomen scanned, not the pelvis. FINDINGS: CTA CHEST: Thoracic aorta: The thoracic aorta is normal in caliber without evidence of dissection. Visualized great vessels are unremarkable in appearance. Mediastinum: The thyroid is heterogeneously enlarged, and has been previously evaluated with ultrasound. No mediastinal or hilar lymphadenopathy identified. Esophagus is unremarkable. Limited imaging of the pulmonary arteries is unremarkable, with no evidence of central pulmonary embolism.  Lungs/pleura: A 6 mm nodule is noted in the left lower lobe, along with a pleural based 11 mm nodule in the right lower lobe, unchanged since 2014 and considered benign. No suspicious lung lesions are identified. No focal infiltrates are seen. No pneumothorax. No pleural effusion. Soft Tissues/Bones: No acute or suspicious bony abnormalities are detected within the chest.  Visualized extra thoracic soft tissues unremarkable. CTA ABDOMEN: Abdominal aorta/branches: The abdominal aorta is normal in caliber without evidence of dissection. The celiac artery and its branches are widely patent. No aneurysms. The superior mesenteric artery is widely patent. Renal arteries are patent bilaterally. Inferior mesenteric artery is diminutive but patent. Organs: No acute or suspicious hepatic abnormality. Diffuse hepatic steatosis is noted. Gallbladder is surgically absent. Normal arterial phase imaging of the spleen. Benign bilateral adrenal lesions are noted, seen as far back as 2014, and requiring no specific follow-up. Pancreas is unremarkable. No acute or suspicious renal abnormalities. GI/Bowel: Visualized bowel is unremarkable. Note that the pelvis was not imaged on the current exam, and therefore the entirety of the bowel is not visualized. Peritoneum/Retroperitoneum: No lymphadenopathy. Bones/Soft Tissues: Visualized extra-abdominal soft tissues are unremarkable. No acute bony abnormalities detected within the abdomen. No evidence of thoracic or abdominal aortic dissection. Overall no acute abnormality identified within the chest and abdomen.            PROCEDURES   Unless otherwise noted below, none     Procedures    CRITICAL CARE TIME   N/A    CONSULTS:  None      EMERGENCY DEPARTMENT COURSE and DIFFERENTIAL DIAGNOSIS/MDM:   Vitals:    Vitals:    10/19/21 1859 10/19/21 1930 10/19/21 2030 10/19/21 2102   BP:  (!) 142/61 (!) 142/58 (!) 150/60   Pulse: 54 68 55 57   Resp:  16 16 18   Temp:       TempSrc:       SpO2:  98% 99% 98%   Weight:       Height: Patient was given the following medications:  Medications   0.9 % sodium chloride bolus (0 mLs IntraVENous Stopped 10/19/21 2127)   iopamidol (ISOVUE-370) 76 % injection 85 mL (85 mLs IntraVENous Given 10/19/21 1941)   aluminum & magnesium hydroxide-simethicone (MAALOX) 30 mL, lidocaine viscous hcl (XYLOCAINE) 5 mL (GI COCKTAIL) ( Oral Given 10/19/21 2126)   famotidine (PEPCID) injection 20 mg (20 mg IntraVENous Given 10/19/21 2135)           Not able to physically reproduce the patient's complaint. She clearly exhibits a discomfort. Her D-dimer is 354 and a did adjust out at 365 based on age. However I would concern with dissection and did place order for CTA chest and abdomen. This resulted is no evidence of dissection or other vascular, pulmonary or bone abnormality. Patient given GI cocktail with questionable improvement. At this point I would exclude intrathoracic problem such as dissection or PE. I will diagnose GERD as well as myofascial thoracic pain. Patient does have uncontrolled hypertension. Patient's initial blood pressure 184/73. The patient blood pressure discharge is 150/60. She is on amlodipine 5 mg take 1 tablet 30. She has plenty at home. She does not take on a regular basis. I have asked her to initiate antihypertensive treatment with amlodipine 5 mg daily. She will follow up with her PCP regarding further treatment of blood pressure. I did asked the patient to utilize Tylenol for pain control. I did ask her to purchase OTC Pepcid 20 mg take this twice daily. She is to follow-up with healthcare provider regarding the continuation or discontinuation of the Pepcid. The patient did express understanding of her diagnosis and the treatment plan. FINAL IMPRESSION      1. Acute thoracic myofascial strain, initial encounter    2. Gastroesophageal reflux disease with esophagitis without hemorrhage    3.  Uncontrolled hypertension          DISPOSITION/PLAN   DISPOSITION Decision To Discharge 10/19/2021 09:28:47 PM      PATIENT REFERRED TO:  Meenu Worthington MD  [address] [address]  [phone]    Schedule an appointment as soon as possible for a visit on 10/25/2021      [facility] ED  [address] [address]  [phone]  Go to   If symptoms worsen      DISCHARGE MEDICATIONS:  Discharge Medication List as of 10/19/2021  9:31 PM          DISCONTINUED MEDICATIONS:  Discharge Medication List as of 10/19/2021  9:31 PM                 (Please note that portions of this note were completed with a voice recognition program.  Efforts were made to edit the dictations but occasionally words are mis-transcribed. )    Adrian Infante PA-C (electronically signed)           Adrian Infante PA-C  10/20/21 3236

## 2021-10-20 NOTE — ED NOTES
Pt ambulated independently to bathroom. Pt steady and stable on feet. Pt tolerated activity well. Pt returned to bed and denies any further needs at this time. Daughter at bedside.      Rebekah Ortez RN  10/19/21 4805

## 2021-10-27 ENCOUNTER — OFFICE VISIT (OUTPATIENT)
Dept: ENDOCRINOLOGY | Age: 71
End: 2021-10-27
Payer: COMMERCIAL

## 2021-10-27 VITALS
OXYGEN SATURATION: 99 % | HEIGHT: 67 IN | HEART RATE: 56 BPM | DIASTOLIC BLOOD PRESSURE: 68 MMHG | BODY MASS INDEX: 29.76 KG/M2 | SYSTOLIC BLOOD PRESSURE: 140 MMHG | WEIGHT: 189.6 LBS

## 2021-10-27 DIAGNOSIS — E03.8 SUBCLINICAL HYPOTHYROIDISM: ICD-10-CM

## 2021-10-27 DIAGNOSIS — E04.2 MULTIPLE THYROID NODULES: Primary | ICD-10-CM

## 2021-10-27 DIAGNOSIS — R94.6 ABNORMAL RESULTS OF THYROID FUNCTION STUDIES: ICD-10-CM

## 2021-10-27 DIAGNOSIS — R79.89 HIGH SERUM THYROID STIMULATING HORMONE (TSH): ICD-10-CM

## 2021-10-27 PROCEDURE — 99204 OFFICE O/P NEW MOD 45 MIN: CPT | Performed by: INTERNAL MEDICINE

## 2021-10-27 NOTE — PROGRESS NOTES
Patient ID: Dillan Page is a 66 y.o. female    Chief Complaint: multiple thyroid nodules     HPI:   Dillan Page is here for initial evaluation of multiple thyroid nodules     Thyroid nodules seen on Ct scan Feb 2021     She reports h/o thyroid nodule FNA > 10 years ago. US thyroid March 2021   Right thyroid lobe:  4.7 x 2.0 x 1.3 cm    Left thyroid lobe:  5.0 x 2.2 x 1.7 cm    Isthmus:  1.9 cm   1: Right inferior cystic nodule of 8 x 7 x 8  mm   2: Right mid isoechoic nodule of 15 x 11 x 9 mm   3: Left mid isoechoic nodule of 38 28 x 21 mm       High TSH:   TSH 4.06 - June 2021   TSH 9.07, Free T4 1.1 - Feb 2021   TSH 4.31, Free T4 1.1 - Feb 2021     Energy levels:some days are up and some day are low   Weight is stable   Hair are getting brittle   Has heat intolerance. Has hot flashes. Mild anxiety   Denies tremors, palpitations, sleep issues   No neck pain, denies compressive neck symptoms     Family history of thyroid disorder: One sister on both daughters are hypothyroid. Grand daughter had thyroid removed ( no cancer)     No neck radiation  No recent iodine loading Oct 2021   Pepcid , advil, Vit D . No other food supplements, herbs or medications including Biotin  No Recent URTI      The following portions of the patient's history were reviewed and updated as appropriate:       Family History   Problem Relation Age of Onset    Diabetes Mother     Heart Disease Mother     High Blood Pressure Mother     High Cholesterol Mother     Vision Loss Mother     Cancer Father     Heart Attack Sister     Heart Failure Brother     Heart Attack Maternal Grandmother     Asthma Maternal Grandmother             Social History     Socioeconomic History    Marital status:       Spouse name: Not on file    Number of children: Not on file    Years of education: Not on file    Highest education level: Not on file   Occupational History    Not on file   Tobacco Use    Smoking status: Never Smoker    Smokeless tobacco: Never Used   Vaping Use    Vaping Use: Never used   Substance and Sexual Activity    Alcohol use: No    Drug use: No    Sexual activity: Yes     Partners: Male   Other Topics Concern    Not on file   Social History Narrative    Not on file     Social Determinants of Health     Financial Resource Strain: Low Risk     Difficulty of Paying Living Expenses: Not hard at all   Food Insecurity: No Food Insecurity    Worried About Running Out of Food in the Last Year: Never true    [address] in the Last Year: Never true   Transportation Needs:     Lack of Transportation (Medical):      Lack of Transportation (Non-Medical):    Physical Activity:     Days of Exercise per Week:     Minutes of Exercise per Session:    Stress:     Feeling of Stress :    Social Connections:     Frequency of Communication with Friends and Family:     Frequency of Social Gatherings with Friends and Family:     Attends Congregational Services:     Active Member of Clubs or Organizations:     Attends Club or Organization Meetings:     Marital Status:    Intimate Partner Violence:     Fear of Current or Ex-Partner:     Emotionally Abused:     Physically Abused:     Sexually Abused:            Past Medical History:   Diagnosis Date    Acid reflux 10/2021    GERD (gastroesophageal reflux disease)     Hyperlipidemia     Hypertension          Past Surgical History:   Procedure Laterality Date    CHOLECYSTECTOMY  09/01    HYSTERECTOMY, TOTAL ABDOMINAL  1999    TONSILLECTOMY  1964    TUBAL LIGATION  1972         No Known Allergies        Current Outpatient Medications:     amLODIPine (NORVASC) 5 MG tablet, Take 1 tablet by mouth daily, Disp: 30 tablet, Rfl: 5    lisinopril-hydroCHLOROthiazide (PRINZIDE;ZESTORETIC) 20-12.5 MG per tablet, Take 1 tablet by mouth 2 times daily, Disp: 60 tablet, Rfl: 5    famotidine (PEPCID) 20 MG tablet, TAKE ONE TABLET BY MOUTH DAILY, Disp: 30 tablet, Rfl: 3   clotrimazole-betamethasone (LOTRISONE) 1-0.05 % cream, Apply to affected area twice a day as needed, Disp: 45 g, Rfl: 1    allopurinol (ZYLOPRIM) 100 MG tablet, Take 1 tablet by mouth daily For  gout, Disp: 30 tablet, Rfl: 5    lovastatin (MEVACOR) 40 MG tablet, Take 1 tablet by mouth daily, Disp: 90 tablet, Rfl: 1    cetirizine (ZYRTEC) 10 MG tablet, Take 0.5 tablets by mouth daily, Disp: 30 tablet, Rfl: 1    fluticasone (FLONASE) 50 MCG/ACT nasal spray, 2 sprays by Each Nostril route daily, Disp: 16 g, Rfl: 0    ASPIRIN 81 PO, Take 1 tablet by mouth daily, Disp: , Rfl:     atenolol (TENORMIN) 50 MG tablet, Take 1 tablet by mouth 2 times daily, Disp: 180 tablet, Rfl: 3    Cholecalciferol (VITAMIN D) 2000 UNITS CAPS capsule, Take by mouth Indications: otc, Disp: , Rfl:     citalopram (CELEXA) 10 MG tablet, Take 1 tablet by mouth daily (Patient not taking: Reported on 10/27/2021), Disp: 30 tablet, Rfl: 5      Review of Systems:  Constitutional: Negative for fever, chills. HENT: Negative for congestion, ear pain, rhinorrhea,  sore throat and trouble swallowing. Eyes: Negative for photophobia, redness, itching. Respiratory: Negative for cough, shortness of breath and sputum. Cardiovascular: Negative for chest pain and leg swelling. Gastrointestinal: Negative for nausea, vomiting, abdominal pain, diarrhea, constipation. Endocrine: Negative for polydipsia, polyphagia and polyuria. Genitourinary: Negative for dysuria, urgency, frequency, hematuria and flank pain. Musculoskeletal: Negative for myalgias, back pain, arthralgias. Skin/Nail: Negative for rash, itching. Normal nails. Neurological: Negative for seizures, light-headedness, numbness and headaches. Hematological/ Lymph nodes: Negative for adenopathy. Does not bruise/bleed easily. Psychiatric/Behavioral: Negative for suicidal ideas and decreased concentration.           Physical Exam:  BP (!) 153/76 (Site: Right Upper Arm, Position: Sitting, Cuff Size: Medium Adult)   Pulse 56   Ht 5' 7\" (1.702 m)   Wt 189 lb 9.6 oz (86 kg)   SpO2 99%   BMI 29.70 kg/m²   Constitutional: Patient is oriented to person, place, and time. Patient appears well-developed and well-nourished. HENT:    Head: Normocephalic and atraumatic. Eyes: Conjunctivae and EOM are normal.    Neck: Normal range of motion. Thyroid normal, left thyroid nodule palpable. Cardiovascular: Normal rate, regular rhythm and normal heart sounds. Pulmonary/Chest: Effort normal and breath sounds normal.   Neurological: Patient is alert and oriented to person, place, and time. Patient has normal reflexes. No hand tremors. Skin: Skin is warm and dry. Psychiatric: Patient has a normal mood and affect.  Patient behavior is normal.     Lab Review:    Admission on 10/19/2021, Discharged on 10/19/2021   Component Date Value Ref Range Status    WBC 10/19/2021 6.9  4.0 - 11.0 K/uL Final    RBC 10/19/2021 4.97  4.00 - 5.20 M/uL Final    Hemoglobin 10/19/2021 15.4  12.0 - 16.0 g/dL Final    Hematocrit 10/19/2021 46.0  36.0 - 48.0 % Final    MCV 10/19/2021 92.6  80.0 - 100.0 fL Final    MCH 10/19/2021 31.1  26.0 - 34.0 pg Final    MCHC 10/19/2021 33.5  31.0 - 36.0 g/dL Final    RDW 10/19/2021 14.4  12.4 - 15.4 % Final    Platelets 64/13/1552 231  135 - 450 K/uL Final    MPV 10/19/2021 7.1  5.0 - 10.5 fL Final    Neutrophils % 10/19/2021 64.4  % Final    Lymphocytes % 10/19/2021 21.0  % Final    Monocytes % 10/19/2021 8.9  % Final    Eosinophils % 10/19/2021 5.2  % Final    Basophils % 10/19/2021 0.5  % Final    Neutrophils Absolute 10/19/2021 4.5  1.7 - 7.7 K/uL Final    Lymphocytes Absolute 10/19/2021 1.4  1.0 - 5.1 K/uL Final    Monocytes Absolute 10/19/2021 0.6  0.0 - 1.3 K/uL Final    Eosinophils Absolute 10/19/2021 0.4  0.0 - 0.6 K/uL Final    Basophils Absolute 10/19/2021 0.0  0.0 - 0.2 K/uL Final    Sodium 10/19/2021 137  136 - 145 mmol/L Final    Potassium reflex Magnesium 10/19/2021 3.9  3.5 - 5.1 mmol/L Final    Chloride 10/19/2021 98* 99 - 110 mmol/L Final    CO2 10/19/2021 26  21 - 32 mmol/L Final    Anion Gap 10/19/2021 13  3 - 16 Final    Glucose 10/19/2021 106* 70 - 99 mg/dL Final    BUN 10/19/2021 16  7 - 20 mg/dL Final    CREATININE 10/19/2021 0.8  0.6 - 1.2 mg/dL Final    GFR Non- 10/19/2021 >60  >60 Final    GFR  10/19/2021 >60  >60 Final    Calcium 10/19/2021 9.8  8.3 - 10.6 mg/dL Final    Total Protein 10/19/2021 7.0  6.4 - 8.2 g/dL Final    Albumin 10/19/2021 4.6  3.4 - 5.0 g/dL Final    Albumin/Globulin Ratio 10/19/2021 1.9  1.1 - 2.2 Final    Total Bilirubin 10/19/2021 0.5  0.0 - 1.0 mg/dL Final    Alkaline Phosphatase 10/19/2021 76  40 - 129 U/L Final    ALT 10/19/2021 21  10 - 40 U/L Final    AST 10/19/2021 18  15 - 37 U/L Final    Globulin 10/19/2021 2.4  Not Established g/dL Final    Troponin 10/19/2021 <0.01  <0.01 ng/mL Final    Ventricular Rate 10/19/2021 65  BPM Final    Atrial Rate 10/19/2021 65  BPM Final    P-R Interval 10/19/2021 176  ms Final    QRS Duration 10/19/2021 106  ms Final    Q-T Interval 10/19/2021 440  ms Final    QTc Calculation (Bazett) 10/19/2021 457  ms Final    R Axis 10/19/2021 -18  degrees Final    T Axis 10/19/2021 27  degrees Final    Diagnosis 10/19/2021 Normal sinus rhythmBaseline artifactVoltage criteria for left ventricular hypertrophyNon-specific intra-ventricular conduction delayNonspecific ST abnormalityAbnormal ECGWhen compared with ECG of 04-FEB-2021 15:27,Premature supraventricular complexes are no longer PresentConfirmed by LUIS Leon MD ([id number]) on 10/20/2021 7:42:18 AM   Final    Pro-BNP 10/19/2021 311* 0 - 124 pg/mL Final    Lipase 10/19/2021 33.0  13.0 - 60.0 U/L Final    Rejected Test 10/19/2021 PT DIMER   Final    Reason for Rejection 10/19/2021 see below   Final    Protime 10/19/2021 10.9  9.9 - 12.7 sec Final    INR 10/19/2021 0.97 0.88 - 1.12 Final    D-Dimer, Quant 10/19/2021 354* 0 - 229 ng/mL DDU Final   Office Visit on 06/01/2021   Component Date Value Ref Range Status    Cholesterol, Total 06/01/2021 171  0 - 199 mg/dL Final    Triglycerides 06/01/2021 217* 0 - 150 mg/dL Final    HDL 06/01/2021 38* 40 - 60 mg/dL Final    LDL Calculated 06/01/2021 90  <100 mg/dL Final    VLDL Cholesterol Calculated 06/01/2021 43  Not Established mg/dL Final    Sodium 06/01/2021 142  136 - 145 mmol/L Final    Potassium 06/01/2021 4.4  3.5 - 5.1 mmol/L Final    Chloride 06/01/2021 103  99 - 110 mmol/L Final    CO2 06/01/2021 28  21 - 32 mmol/L Final    Anion Gap 06/01/2021 11  3 - 16 Final    Glucose 06/01/2021 116* 70 - 99 mg/dL Final    BUN 06/01/2021 16  7 - 20 mg/dL Final    CREATININE 06/01/2021 0.8  0.6 - 1.2 mg/dL Final    GFR Non- 06/01/2021 >60  >60 Final    GFR  06/01/2021 >60  >60 Final    Calcium 06/01/2021 9.4  8.3 - 10.6 mg/dL Final    Total Protein 06/01/2021 6.3* 6.4 - 8.2 g/dL Final    Albumin 06/01/2021 4.4  3.4 - 5.0 g/dL Final    Albumin/Globulin Ratio 06/01/2021 2.3* 1.1 - 2.2 Final    Total Bilirubin 06/01/2021 0.5  0.0 - 1.0 mg/dL Final    Alkaline Phosphatase 06/01/2021 61  40 - 129 U/L Final    ALT 06/01/2021 17  10 - 40 U/L Final    AST 06/01/2021 20  15 - 37 U/L Final    Globulin 06/01/2021 1.9  g/dL Final    Uric Acid, Serum 06/01/2021 9.1* 2.6 - 6.0 mg/dL Final   Admission on 02/02/2021, Discharged on 02/04/2021   Component Date Value Ref Range Status    Ventricular Rate 02/02/2021 195  BPM Final    Atrial Rate 02/02/2021 192  BPM Final    QRS Duration 02/02/2021 96  ms Final    Q-T Interval 02/02/2021 258  ms Final    QTc Calculation (Bazett) 02/02/2021 464  ms Final    R Axis 02/02/2021 -28  degrees Final    T Axis 02/02/2021 151  degrees Final    Diagnosis 02/02/2021 Supraventricular tachycardiaLeft ventricular hypertrophy with repolarization abnormalityAbnormal ECGWhen compared with ECG of 25-JUL-2020 17:59,Premature atrial complexes are no longer PresentConfirmed by Queta Hanna MD, Antoinette Velez ([id number]) on 2/2/2021 2:57:52 PM   Final    WBC 02/02/2021 10.5  4.0 - 11.0 K/uL Final    RBC 02/02/2021 5.55* 4.00 - 5.20 M/uL Final    Hemoglobin 02/02/2021 16.8* 12.0 - 16.0 g/dL Final    Hematocrit 02/02/2021 50.6* 36.0 - 48.0 % Final    MCV 02/02/2021 91.1  80.0 - 100.0 fL Final    MCH 02/02/2021 30.3  26.0 - 34.0 pg Final    MCHC 02/02/2021 33.2  31.0 - 36.0 g/dL Final    RDW 02/02/2021 14.1  12.4 - 15.4 % Final    Platelets 50/55/5450 321  135 - 450 K/uL Final    MPV 02/02/2021 7.5  5.0 - 10.5 fL Final    Neutrophils % 02/02/2021 45.5  % Final    Lymphocytes % 02/02/2021 38.4  % Final    Monocytes % 02/02/2021 12.0  % Final    Eosinophils % 02/02/2021 3.5  % Final    Basophils % 02/02/2021 0.6  % Final    Neutrophils Absolute 02/02/2021 4.8  1.7 - 7.7 K/uL Final    Lymphocytes Absolute 02/02/2021 4.0  1.0 - 5.1 K/uL Final    Monocytes Absolute 02/02/2021 1.3  0.0 - 1.3 K/uL Final    Eosinophils Absolute 02/02/2021 0.4  0.0 - 0.6 K/uL Final    Basophils Absolute 02/02/2021 0.1  0.0 - 0.2 K/uL Final    Sodium 02/02/2021 136  136 - 145 mmol/L Final    Potassium 02/02/2021 3.5  3.5 - 5.1 mmol/L Final    Chloride 02/02/2021 99  99 - 110 mmol/L Final    CO2 02/02/2021 25  21 - 32 mmol/L Final    Anion Gap 02/02/2021 12  3 - 16 Final    Glucose 02/02/2021 109* 70 - 99 mg/dL Final    BUN 02/02/2021 19  7 - 20 mg/dL Final    CREATININE 02/02/2021 0.9  0.6 - 1.2 mg/dL Final    GFR Non- 02/02/2021 >60  >60 Final    GFR  02/02/2021 >60  >60 Final    Calcium 02/02/2021 9.9  8.3 - 10.6 mg/dL Final    Total Protein 02/02/2021 6.8  6.4 - 8.2 g/dL Final    Albumin 02/02/2021 4.2  3.4 - 5.0 g/dL Final    Albumin/Globulin Ratio 02/02/2021 1.6  1.1 - 2.2 Final    Total Bilirubin 02/02/2021 0.5  0.0 - 1.0 mg/dL Final  Alkaline Phosphatase 02/02/2021 66  40 - 129 U/L Final    ALT 02/02/2021 18  10 - 40 U/L Final    AST 02/02/2021 18  15 - 37 U/L Final    Globulin 02/02/2021 2.6  g/dL Final    Troponin 02/02/2021 <0.01  <0.01 ng/mL Final    Color, UA 02/02/2021 Yellow  Straw/Yellow Final    Clarity, UA 02/02/2021 Clear  Clear Final    Glucose, Ur 02/02/2021 Negative  Negative mg/dL Final    Bilirubin Urine 02/02/2021 Negative  Negative Final    Ketones, Urine 02/02/2021 Negative  Negative mg/dL Final    Specific Gravity, UA 02/02/2021 1.010  1.005 - 1.030 Final    Blood, Urine 02/02/2021 Negative  Negative Final    pH, UA 02/02/2021 6.0  5.0 - 8.0 Final    Protein, UA 02/02/2021 Negative  Negative mg/dL Final    Urobilinogen, Urine 02/02/2021 0.2  <2.0 E.U./dL Final    Nitrite, Urine 02/02/2021 POSITIVE* Negative Final    Leukocyte Esterase, Urine 02/02/2021 SMALL* Negative Final    Microscopic Examination 02/02/2021 YES   Final    Urine Type 02/02/2021 NotGiven   Final    Lactic Acid 02/02/2021 3.0* 0.4 - 2.0 mmol/L Final    Ventricular Rate 02/02/2021 99  BPM Final    Atrial Rate 02/02/2021 99  BPM Final    P-R Interval 02/02/2021 218  ms Final    QRS Duration 02/02/2021 108  ms Final    Q-T Interval 02/02/2021 344  ms Final    QTc Calculation (Bazett) 02/02/2021 441  ms Final    P Axis 02/02/2021 42  degrees Final    R Axis 02/02/2021 -35  degrees Final    T Axis 02/02/2021 149  degrees Final    Diagnosis 02/02/2021 Atrial tachycardia with 2:1 A-V conductionLeft ventricular hypertrophy with repolarization abnormalityNon-specific intra-ventricular conduction delayAbnormal ECGWhen compared with ECG of 02-FEB-2021 13:58,Atrial tachycardia is persistent with a change from 1:1 to 2:1 AV conductionConfirmed by Doris Daniel MD, Gerri Lanza ([id number]) on 2/3/2021 6:14:15 PM   Final    D-Dimer, Quant 02/02/2021 507* 0 - 229 ng/mL DDU Final    Pro-BNP 02/02/2021 1,952* 0 - 124 pg/mL Final    aPTT 02/02/2021 31.1 24.2 - 36.2 sec Final    Protime 02/02/2021 11.6  10.0 - 13.2 sec Final    INR 02/02/2021 1.00  0.86 - 1.14 Final    WBC, UA 02/02/2021 6-9* 0 - 5 /HPF Final    RBC, UA 02/02/2021 0-2  0 - 4 /HPF Final    Epithelial Cells, UA 02/02/2021 2-5  0 - 5 /HPF Final    Bacteria, UA 02/02/2021 2+* None Seen /HPF Final    Ventricular Rate 02/02/2021 99  BPM Final    Atrial Rate 02/02/2021 99  BPM Final    P-R Interval 02/02/2021 214  ms Final    QRS Duration 02/02/2021 106  ms Final    Q-T Interval 02/02/2021 374  ms Final    QTc Calculation (Bazett) 02/02/2021 479  ms Final    P Axis 02/02/2021 93  degrees Final    R Axis 02/02/2021 -39  degrees Final    T Axis 02/02/2021 138  degrees Final    Diagnosis 02/02/2021 Atrial tachycardia with 2:1 A-V conductionNon-specific intra-ventricular conduction delayLeft ventricular hypertrophy with repolarization abnormalityAbnormal ECGWhen compared with ECG of 02-FEB-2021 15:04,No significant change was foundConfirmed by Ricky Cruz MD, Thelma Mistry ([id number]) on 2/3/2021 6:15:22 PM   Final    Procalcitonin 02/02/2021 0.04  0.00 - 0.15 ng/mL Final    TSH Reflex FT4 02/02/2021 9.07* 0.27 - 4.20 uIU/mL Final    Lactic Acid 02/02/2021 1.5  0.4 - 2.0 mmol/L Final    Lactic Acid 02/03/2021 1.0  0.4 - 2.0 mmol/L Final    Troponin 02/02/2021 <0.01  <0.01 ng/mL Final    Troponin 02/02/2021 <0.01  <0.01 ng/mL Final    Troponin 02/03/2021 <0.01  <0.01 ng/mL Final    Troponin 02/03/2021 <0.01  <0.01 ng/mL Final    Blood Culture, Routine 02/02/2021 No Growth after 4 days of incubation. Final    Culture, Blood 2 02/02/2021 No Growth after 4 days of incubation.    Final    Hemoglobin A1C 02/02/2021 5.8  See comment % Final    eAG 02/02/2021 119.8  mg/dL Final    T4 Free 02/02/2021 1.1  0.9 - 1.8 ng/dL Final    TSH 02/02/2021 4.31* 0.27 - 4.20 uIU/mL Final    Troponin 02/03/2021 <0.01  <0.01 ng/mL Final    Lactic Acid 02/03/2021 1.3  0.4 - 2.0 mmol/L Final    Ventricular Rate 02/03/2021 103  BPM Final    Atrial Rate 02/03/2021 103  BPM Final    P-R Interval 02/03/2021 208  ms Final    QRS Duration 02/03/2021 102  ms Final    Q-T Interval 02/03/2021 314  ms Final    QTc Calculation (Bazett) 02/03/2021 411  ms Final    P Axis 02/03/2021 -26  degrees Final    R Axis 02/03/2021 -41  degrees Final    T Axis 02/03/2021 173  degrees Final    Diagnosis 02/03/2021 Atrial tachycardiaLeft axis deviationNon-specific intra-ventricular conduction delayMinimal voltage criteria for LVH, may be normal variantST & T wave abnormality, consider lateral ischemiaAbnormal ECGWhen compared with ECG of 02-FEB-2021 15:13,Nonspecific T wave abnormality, worse in Anterior leadsQT has shortenedConfirmed by Stephanie Castro MD, Estela Shea ([id number]) on 2/3/2021 6:17:02 PM   Final    T4 Free 02/02/2021 1.1  0.9 - 1.8 ng/dL Final    Lactic Acid 02/03/2021 1.8  0.4 - 2.0 mmol/L Final    Pro-BNP 02/03/2021 2,677* 0 - 124 pg/mL Final    Sodium 02/03/2021 141  136 - 145 mmol/L Final    Potassium reflex Magnesium 02/03/2021 3.3* 3.5 - 5.1 mmol/L Final    Chloride 02/03/2021 110  99 - 110 mmol/L Final    CO2 02/03/2021 24  21 - 32 mmol/L Final    Anion Gap 02/03/2021 7  3 - 16 Final    Glucose 02/03/2021 106* 70 - 99 mg/dL Final    BUN 02/03/2021 17  7 - 20 mg/dL Final    CREATININE 02/03/2021 0.8  0.6 - 1.2 mg/dL Final    GFR Non- 02/03/2021 >60  >60 Final    GFR  02/03/2021 >60  >60 Final    Calcium 02/03/2021 8.8  8.3 - 10.6 mg/dL Final    Total Protein 02/03/2021 5.5* 6.4 - 8.2 g/dL Final    Albumin 02/03/2021 3.7  3.4 - 5.0 g/dL Final    Albumin/Globulin Ratio 02/03/2021 2.1  1.1 - 2.2 Final    Total Bilirubin 02/03/2021 0.3  0.0 - 1.0 mg/dL Final    Alkaline Phosphatase 02/03/2021 52  40 - 129 U/L Final    ALT 02/03/2021 15  10 - 40 U/L Final    AST 02/03/2021 13* 15 - 37 U/L Final    Globulin 02/03/2021 1.8  g/dL Final    Magnesium 02/03/2021 1.90  1.80 - 2.40 mg/dL Final    POC Glucose 02/03/2021 105* 70 - 99 mg/dl Final    Performed on 02/03/2021 ACCU-CHEK   Final    Lactic Acid 02/03/2021 1.5  0.4 - 2.0 mmol/L Final    Left Ventricular Ejection Fraction 02/04/2021 53   Final-Edited    LVEF MODALITY 02/04/2021 ECHO   Final-Edited    POC Glucose 02/03/2021 114* 70 - 99 mg/dl Final    Performed on 02/03/2021 ACCU-CHEK   Final    Ventricular Rate 02/04/2021 108  BPM Final    Atrial Rate 02/04/2021 108  BPM Final    P-R Interval 02/04/2021 212  ms Final    QRS Duration 02/04/2021 98  ms Final    Q-T Interval 02/04/2021 330  ms Final    QTc Calculation (Bazett) 02/04/2021 442  ms Final    P Axis 02/04/2021 9  degrees Final    R Axis 02/04/2021 -40  degrees Final    T Axis 02/04/2021 161  degrees Final    Diagnosis 02/04/2021 Atrial tachycardia Atrial flutter with 2 to 1 blockLeft axis deviationNon-specific intra-ventricular conduction delayNonspecific ST & T wave changesMinimal voltage criteria for LVH, may be normal variantST & T wave abnormality, consider lateral ischemiaAbnormal ECGWhen compared with ECG of 03-FEB-2021 09:22,No significant change was foundConfirmed by Mario Rice MD, Susannah Araujo ([id number]) on 2/4/2021 1:59:23 PM   Final    Ventricular Rate 02/04/2021 56  BPM Final    Atrial Rate 02/04/2021 56  BPM Final    P-R Interval 02/04/2021 194  ms Final    QRS Duration 02/04/2021 104  ms Final    Q-T Interval 02/04/2021 454  ms Final    QTc Calculation (Bazett) 02/04/2021 438  ms Final    P Axis 02/04/2021 78  degrees Final    R Axis 02/04/2021 -35  degrees Final    T Axis 02/04/2021 -1  degrees Final    Diagnosis 02/04/2021 Sinus bradycardia with Premature supraventricular complexesLeft axis deviationMinimal voltage criteria for LVH, may be normal variantNonspecific ST and T wave abnormalityAbnormal ECGWhen compared with ECG of 04-FEB-2021 13:30,Sinus rhythm has replaced Atrial tachycardiaConfirmed by Mario Rice MD, Susannah Araujo ([id number]) on 2/4/2021 4:50:35 PM   Final   Office Visit on 12/08/2020   Component Date Value Ref Range Status    Cholesterol, Total 12/08/2020 168  0 - 199 mg/dL Final    Triglycerides 12/08/2020 183* 0 - 150 mg/dL Final    HDL 12/08/2020 37* 40 - 60 mg/dL Final    LDL Calculated 12/08/2020 94  <100 mg/dL Final    VLDL Cholesterol Calculated 12/08/2020 37  Not Established mg/dL Final    Sodium 12/08/2020 140  136 - 145 mmol/L Final    Potassium 12/08/2020 4.4  3.5 - 5.1 mmol/L Final    Chloride 12/08/2020 102  99 - 110 mmol/L Final    CO2 12/08/2020 28  21 - 32 mmol/L Final    Anion Gap 12/08/2020 10  3 - 16 Final    Glucose 12/08/2020 105* 70 - 99 mg/dL Final    BUN 12/08/2020 18  7 - 20 mg/dL Final    CREATININE 12/08/2020 0.7  0.6 - 1.2 mg/dL Final    GFR Non- 12/08/2020 >60  >60 Final    GFR  12/08/2020 >60  >60 Final    Calcium 12/08/2020 9.7  8.3 - 10.6 mg/dL Final    Total Protein 12/08/2020 6.3* 6.4 - 8.2 g/dL Final    Albumin 12/08/2020 4.3  3.4 - 5.0 g/dL Final    Albumin/Globulin Ratio 12/08/2020 2.2  1.1 - 2.2 Final    Total Bilirubin 12/08/2020 0.5  0.0 - 1.0 mg/dL Final    Alkaline Phosphatase 12/08/2020 68  40 - 129 U/L Final    ALT 12/08/2020 16  10 - 40 U/L Final    AST 12/08/2020 15  15 - 37 U/L Final    Globulin 12/08/2020 2.0  g/dL Final        CTA CHEST ABDOMEN W CONTRAST    Result Date: 10/19/2021  EXAMINATION: CTA OF THE CHEST AND ABDOMEN WITH CONTRAST 10/19/2021 4:43 pm: TECHNIQUE: CTA of the chest and abdomen was performed after the administration of intravenous contrast.  Multiplanar reformatted images are provided for review. MIP images are provided for review. Dose modulation, iterative reconstruction, and/or weight based adjustment of the mA/kV was utilized to reduce the radiation dose to as low as reasonably achievable. COMPARISON: None.  HISTORY: ORDERING SYSTEM PROVIDED HISTORY: Discomfort upper back, neck and chest, rule out dissection TECHNOLOGIST PROVIDED HISTORY: Reason for exam:->Discomfort upper back, neck and chest, rule out dissection Decision Support Exception - unselect if not a suspected or confirmed emergency medical condition->Emergency Medical Condition (MA) Reason for Exam: Pt c/o having hypertension, c/o upper back/chest pain. Acuity: Acute Type of Exam: Initial Relevant Medical/Surgical History: Per HIRAM Doll, he ONLY wanted the chest and abdomen scanned, not the pelvis. FINDINGS: CTA CHEST: Thoracic aorta: The thoracic aorta is normal in caliber without evidence of dissection. Visualized great vessels are unremarkable in appearance. Mediastinum: The thyroid is heterogeneously enlarged, and has been previously evaluated with ultrasound. No mediastinal or hilar lymphadenopathy identified. Esophagus is unremarkable. Limited imaging of the pulmonary arteries is unremarkable, with no evidence of central pulmonary embolism. Lungs/pleura: A 6 mm nodule is noted in the left lower lobe, along with a pleural based 11 mm nodule in the right lower lobe, unchanged since 2014 and considered benign. No suspicious lung lesions are identified. No focal infiltrates are seen. No pneumothorax. No pleural effusion. Soft Tissues/Bones: No acute or suspicious bony abnormalities are detected within the chest.  Visualized extra thoracic soft tissues unremarkable. CTA ABDOMEN: Abdominal aorta/branches: The abdominal aorta is normal in caliber without evidence of dissection. The celiac artery and its branches are widely patent. No aneurysms. The superior mesenteric artery is widely patent. Renal arteries are patent bilaterally. Inferior mesenteric artery is diminutive but patent. Organs: No acute or suspicious hepatic abnormality. Diffuse hepatic steatosis is noted. Gallbladder is surgically absent. Normal arterial phase imaging of the spleen. Benign bilateral adrenal lesions are noted, seen as far back as 2014, and requiring no specific follow-up. Pancreas is unremarkable. No acute or suspicious renal abnormalities. GI/Bowel: Visualized bowel is unremarkable. Note that the pelvis was not imaged on the current exam, and therefore the entirety of the bowel is not visualized. Peritoneum/Retroperitoneum: No lymphadenopathy. Bones/Soft Tissues: Visualized extra-abdominal soft tissues are unremarkable. No acute bony abnormalities detected within the abdomen. No evidence of thoracic or abdominal aortic dissection. Overall no acute abnormality identified within the chest and abdomen. Assessment/Plan: Meryle Beecham was seen today for consultation and thyroid problem. Diagnoses and all orders for this visit:    Multiple thyroid nodules  -     US FINE NEEDLE ASPIRATION; Future    High serum thyroid stimulating hormone (TSH)  -     TSH without Reflex; Future  -     T4, Free; Future  -     Thyroid Peroxidase Antibody; Future  -     Anti-Thyroglobulin Antibody; Future    Subclinical hypothyroidism    Abnormal results of thyroid function studies   -     TSH without Reflex; Future  -     T4, Free; Future        1: Multiple thyroid nodules   Reviewed images independently     recommend F[facility] of left sided nodule at [facility]     Will call for results     Thyroid nodules are common, present in 50% of women and 33% of men. Risk of cancer is generally < 10%    FNA thyroid has False negative rates of about 3%. Non diagnostic rate of <10%. Will do genetic testing if needed. 2: High TSH   Subclinical hypothyroidism     Will treat if TSH goes more than 10 or more than 7 with antibodies     Recheck numbers and thyroid antibodies     Education: Reviewed how to properly take thyroid replacement. Instructed to take daily with water on an empty stomach without concomitant vitamins or calcium or other medicine. Wait for 30-45 minutes before eating.  If patient is confident they missed a day of pills, they can take the missed dose with their next tablet (only for levothyroxine).     RTC in 3 months       Electronically signed by Jaime Stoner MD on 10/27/2021 at 2:34 PM

## 2021-10-28 ENCOUNTER — TELEPHONE (OUTPATIENT)
Dept: ENDOCRINOLOGY | Age: 71
End: 2021-10-28

## 2021-10-28 LAB
ANTI-THYROGLOB ABS: 23 IU/ML
T4 FREE: 1 NG/DL (ref 0.9–1.8)
THYROID PEROXIDASE (TPO) ABS: 73 IU/ML
TSH SERPL DL<=0.05 MIU/L-ACNC: 2.97 UIU/ML (ref 0.27–4.2)

## 2021-10-28 NOTE — TELEPHONE ENCOUNTER
----- Message from Leydi Naranjo MD sent at 10/28/2021  8:25 AM EDT -----  Thyroid levels are normal   Will continue to monitor

## 2021-10-30 ASSESSMENT — ENCOUNTER SYMPTOMS
SORE THROAT: 0
COUGH: 0

## 2021-10-30 NOTE — PROGRESS NOTES
agrees on taking citalopram 10 mg once a day. Letter    As far as her thyroid nodules are concerned she does have an appointment with the endocrinologist and she will probably end up having FNA which she has been due for some time and she is aware that. Lab Results   Component Value Date    TSHFT4 9.07 (H) 02/02/2021    TSH 2.97 10/27/2021   She was put onPepcid for her l ask and hopefully that should take care of her reflux. Review of Systems   Constitutional: Negative for activity change. HENT: Negative. Negative for sore throat. Respiratory: Negative for cough. Cardiovascular: Negative for chest pain. Gastrointestinal:        Taking pepcid OTC for gerd   Musculoskeletal: Negative for neck pain. Neurological: Negative for dizziness and headaches. Speech difficulty:    Hematological: Does not bruise/bleed easily. Psychiatric/Behavioral: The patient is nervous/anxious. Objective   Physical Exam  Vitals and nursing note reviewed. Constitutional:       Appearance: She is well-developed. HENT:      Head: Normocephalic. Right Ear: External ear normal.      Left Ear: External ear normal.      Nose: Nose normal.      Mouth/Throat:      Pharynx: No oropharyngeal exudate. Neck:      Thyroid: No thyromegaly. Cardiovascular:      Rate and Rhythm: Normal rate. Pulmonary:      Effort: Pulmonary effort is normal.   Abdominal:      General: Abdomen is flat. Tenderness: There is no abdominal tenderness. Musculoskeletal:         General: Normal range of motion. Cervical back: Normal range of motion and neck supple. Neurological:      General: No focal deficit present. Psychiatric:         Mood and Affect: Mood normal.     INstruction:   Continue meds       Reviewed  previous notes, test results and face to face with the patient discussing the diagnosis and importance of compliance with the treatment plan as well as documenting on the day of the visit.       An electronic signature was used to authenticate this note.     --Nicole Birmingham MD

## 2021-11-08 ENCOUNTER — HOSPITAL ENCOUNTER (OUTPATIENT)
Dept: MAMMOGRAPHY | Age: 71
Discharge: HOME | End: 2021-11-08
Payer: COMMERCIAL

## 2021-11-08 DIAGNOSIS — Z12.31 BREAST CANCER SCREENING BY MAMMOGRAM: ICD-10-CM

## 2021-11-08 PROCEDURE — 77067 SCR MAMMO BI INCL CAD: CPT

## 2021-11-30 ENCOUNTER — TELEPHONE (OUTPATIENT)
Dept: FAMILY MEDICINE CLINIC | Age: 71
End: 2021-11-30

## 2021-11-30 RX ORDER — NITROFURANTOIN 25; 75 MG/1; MG/1
100 CAPSULE ORAL 2 TIMES DAILY
Qty: 14 CAPSULE | Refills: 0 | Status: SHIPPED | OUTPATIENT
Start: 2021-11-30 | End: 2021-12-07

## 2021-11-30 NOTE — TELEPHONE ENCOUNTER
10/20/2021    Future Appointments   Date Time Provider Praveen Wilkerson   1/26/2022 12:40 PM Nechama Spurling, MD [facility]

## 2021-11-30 NOTE — TELEPHONE ENCOUNTER
----- Message from Rashel Mccurdy sent at 11/30/2021 11:28 AM EST -----  Subject: Message to Provider    QUESTIONS  Information for Provider? patient has UTI and would like an antibiotic   prescribed.  ---------------------------------------------------------------------------  --------------  CALL BACK INFO  What is the best way for the office to contact you? OK to leave message on   voicemail  Preferred Call Back Phone Number? [phone]  ---------------------------------------------------------------------------  --------------  SCRIPT ANSWERS  Relationship to Patient?  Self

## 2021-11-30 NOTE — TELEPHONE ENCOUNTER
----- Message from Nicolas Boo sent at 11/30/2021 11:28 AM EST -----  Subject: Message to Provider    QUESTIONS  Information for Provider? patient has UTI and would like an antibiotic   prescribed.  ---------------------------------------------------------------------------  --------------  CALL BACK INFO  What is the best way for the office to contact you? OK to leave message on   voicemail  Preferred Call Back Phone Number? [phone]  ---------------------------------------------------------------------------  --------------  SCRIPT ANSWERS  Relationship to Patient?  Self

## 2021-12-13 ENCOUNTER — TELEPHONE (OUTPATIENT)
Dept: FAMILY MEDICINE CLINIC | Age: 71
End: 2021-12-13

## 2021-12-13 DIAGNOSIS — E78.00 PURE HYPERCHOLESTEROLEMIA: ICD-10-CM

## 2021-12-13 DIAGNOSIS — E79.0 HYPERURICEMIA: ICD-10-CM

## 2021-12-13 RX ORDER — LOVASTATIN 40 MG/1
40 TABLET ORAL DAILY
Qty: 90 TABLET | Refills: 1 | Status: SHIPPED | OUTPATIENT
Start: 2021-12-13 | End: 2022-06-17

## 2021-12-13 RX ORDER — ALLOPURINOL 100 MG/1
100 TABLET ORAL DAILY
Qty: 90 TABLET | Refills: 1 | Status: SHIPPED | OUTPATIENT
Start: 2021-12-13 | End: 2022-06-17

## 2021-12-13 NOTE — TELEPHONE ENCOUNTER
Patient needs a refill on Lovastatin and Allopurinol. They need a 90 day supply.      Mail order or local pharmacy: local    Pharmacy: [facility]     Patient  or mail to patient(If mail order):    Last OV 10/20/21    Future Appointments   Date Time Provider Praveen Wilkerson   1/26/2022 12:40 PM Sharon Alonzo MD [facility]

## 2021-12-13 NOTE — TELEPHONE ENCOUNTER
10/20/2021    Future Appointments   Date Time Provider Praveen Wilkerson   1/26/2022 12:40 PM Hang Butterfield MD [facility]

## 2021-12-20 ENCOUNTER — TELEPHONE (OUTPATIENT)
Dept: FAMILY MEDICINE CLINIC | Age: 71
End: 2021-12-20

## 2021-12-20 DIAGNOSIS — J01.90 ACUTE NON-RECURRENT SINUSITIS, UNSPECIFIED LOCATION: ICD-10-CM

## 2021-12-20 RX ORDER — AZITHROMYCIN 250 MG/1
250 TABLET, FILM COATED ORAL SEE ADMIN INSTRUCTIONS
Qty: 6 TABLET | Refills: 0 | Status: SHIPPED | OUTPATIENT
Start: 2021-12-20 | End: 2021-12-25

## 2021-12-20 NOTE — TELEPHONE ENCOUNTER
Ish Sharif called for patient. Katya Funez has  nasal congestion and \"little bit of yellow drainage. \"  Also, cough producing \"little bit of yellow mucus. \"     Patient has not been around anyone with Covid. Ish Saner is asking for zpak for patient. She is requesting this be done before patient's appointment tomorrow. Ish Saner wants patient well before [place]. Pharmacy is [facility]. Future Appointments   Date Time Provider Praveen Wilkerson   12/21/2021 10:40 AM Lea Turner MD [facility]   1/26/2022 12:40 PM Barb Cowan MD [facility]   Past appointment was 10/20/21.

## 2021-12-21 ENCOUNTER — VIRTUAL VISIT (OUTPATIENT)
Dept: FAMILY MEDICINE CLINIC | Age: 71
End: 2021-12-21
Payer: COMMERCIAL

## 2021-12-21 DIAGNOSIS — J01.00 ACUTE NON-RECURRENT MAXILLARY SINUSITIS: Primary | ICD-10-CM

## 2021-12-21 PROCEDURE — G2012 BRIEF CHECK IN BY MD/QHP: HCPCS | Performed by: INTERNAL MEDICINE

## 2021-12-21 NOTE — PROGRESS NOTES
Jovanny Dixon is a 66 y.o. female evaluated via telephone on 12/21/2021. Consent:  She and/or health care decision maker is aware that that she may receive a bill for this telephone service, depending on her insurance coverage, and has provided verbal consent to proceed: Yes      Documentation:  I communicated with the patient and/or health care decision maker about URI  Details of this discussion including any medical advice provided:     Sinus infection- been going on for 4 days, was not able to f get her prescription last night, has runny nose, sinus congestion, cough productive with yellowish phlegm , no fever or body aches, Covid vaccinated 2 weeks ago. Daughter will get home test today. I affirm this is a Patient Initiated Episode with a Patient who has not had a related appointment within my department in the past 7 days or scheduled within the next 24 hours. Patient identification was verified at the start of the visit: Yes    Total Time: minutes: 5-10 minutes    The visit was conducted pursuant to the emergency declaration under the [facility], [address] [place] [address] authority and the [employer] General Act. Patient identification was verified, and a caregiver was present when appropriate. The patient was located in a state where the provider was credentialed to provide care.     Note: not billable if this call serves to triage the patient into an appointment for the relevant concern      Marlee Whitney MD

## 2021-12-28 ENCOUNTER — TELEPHONE (OUTPATIENT)
Dept: FAMILY MEDICINE CLINIC | Age: 71
End: 2021-12-28

## 2021-12-28 NOTE — TELEPHONE ENCOUNTER
[facility] She said she will [facility]. Advised her that she can go to [facility] for help with directions.

## 2022-01-11 ENCOUNTER — TELEPHONE (OUTPATIENT)
Dept: FAMILY MEDICINE CLINIC | Age: 72
End: 2022-01-11

## 2022-01-11 NOTE — TELEPHONE ENCOUNTER
Pt called stating she is having sick symptoms. I offered a virtual appointment, pt stated she can't wait until then and asked if Dr. Blane Lopez would call something in. The zpak seemed to help. Pharmacy    [address], [place] [address]. [facility]        Last appt.   12/21/2021    Future Appointments   Date Time Provider Praveen Wilkerson   1/26/2022 12:40 PM Dori Moya MD [facility]

## 2022-01-12 DIAGNOSIS — J01.90 ACUTE SINUSITIS, RECURRENCE NOT SPECIFIED, UNSPECIFIED LOCATION: Primary | ICD-10-CM

## 2022-01-12 RX ORDER — AZITHROMYCIN 250 MG/1
250 TABLET, FILM COATED ORAL SEE ADMIN INSTRUCTIONS
Qty: 6 TABLET | Refills: 0 | Status: CANCELLED | OUTPATIENT
Start: 2022-01-12 | End: 2022-01-17

## 2022-01-12 RX ORDER — AZITHROMYCIN 250 MG/1
250 TABLET, FILM COATED ORAL SEE ADMIN INSTRUCTIONS
Qty: 6 TABLET | Refills: 0 | Status: SHIPPED | OUTPATIENT
Start: 2022-01-12 | End: 2022-01-17

## 2022-03-02 DIAGNOSIS — K21.9 GASTROESOPHAGEAL REFLUX DISEASE WITHOUT ESOPHAGITIS: ICD-10-CM

## 2022-03-03 RX ORDER — FAMOTIDINE 20 MG/1
TABLET, FILM COATED ORAL
Qty: 30 TABLET | Refills: 3 | Status: SHIPPED | OUTPATIENT
Start: 2022-03-03 | End: 2022-04-28 | Stop reason: SDUPTHER

## 2022-04-18 DIAGNOSIS — I10 ESSENTIAL HYPERTENSION: ICD-10-CM

## 2022-04-19 RX ORDER — LISINOPRIL AND HYDROCHLOROTHIAZIDE 20; 12.5 MG/1; MG/1
TABLET ORAL
Qty: 60 TABLET | Refills: 0 | Status: SHIPPED | OUTPATIENT
Start: 2022-04-19 | End: 2022-04-28 | Stop reason: SDUPTHER

## 2022-04-21 NOTE — TELEPHONE ENCOUNTER
Patient said that she she her COLOGUARD back and she never got results. Per Cydney Lolling at [facility], patient returned COLOGUARD kit back in January 2022. It was past the year marker from when it was ordered in October 1, 2020, so it couldn't be processed. Advised that patient was notified.     Informed patient and sent her a FIT test.

## 2022-04-28 DIAGNOSIS — K21.9 GASTROESOPHAGEAL REFLUX DISEASE WITHOUT ESOPHAGITIS: ICD-10-CM

## 2022-04-28 DIAGNOSIS — I10 ESSENTIAL HYPERTENSION: ICD-10-CM

## 2022-04-28 RX ORDER — ATENOLOL 50 MG/1
50 TABLET ORAL 2 TIMES DAILY
Qty: 60 TABLET | Refills: 1 | Status: SHIPPED | OUTPATIENT
Start: 2022-04-28 | End: 2022-06-17

## 2022-04-28 RX ORDER — LISINOPRIL AND HYDROCHLOROTHIAZIDE 20; 12.5 MG/1; MG/1
1 TABLET ORAL 2 TIMES DAILY
Qty: 60 TABLET | Refills: 1 | Status: SHIPPED | OUTPATIENT
Start: 2022-04-28 | End: 2022-07-12

## 2022-04-28 RX ORDER — AMLODIPINE BESYLATE 5 MG/1
5 TABLET ORAL DAILY
Qty: 30 TABLET | Refills: 1 | Status: SHIPPED | OUTPATIENT
Start: 2022-04-28 | End: 2022-07-12

## 2022-04-28 RX ORDER — FAMOTIDINE 20 MG/1
20 TABLET, FILM COATED ORAL NIGHTLY PRN
Qty: 30 TABLET | Refills: 3 | Status: SHIPPED | OUTPATIENT
Start: 2022-04-28 | End: 2022-10-19

## 2022-05-25 ENCOUNTER — OFFICE VISIT (OUTPATIENT)
Dept: FAMILY MEDICINE CLINIC | Age: 72
End: 2022-05-25
Payer: COMMERCIAL

## 2022-05-25 VITALS
TEMPERATURE: 96.9 F | WEIGHT: 188.2 LBS | HEART RATE: 52 BPM | DIASTOLIC BLOOD PRESSURE: 80 MMHG | SYSTOLIC BLOOD PRESSURE: 132 MMHG | HEIGHT: 67 IN | RESPIRATION RATE: 16 BRPM | OXYGEN SATURATION: 100 % | BODY MASS INDEX: 29.54 KG/M2

## 2022-05-25 DIAGNOSIS — R73.01 IMPAIRED FASTING BLOOD SUGAR: ICD-10-CM

## 2022-05-25 DIAGNOSIS — Z12.11 ENCOUNTER FOR FIT (FECAL IMMUNOCHEMICAL TEST) SCREENING: ICD-10-CM

## 2022-05-25 DIAGNOSIS — E78.00 PURE HYPERCHOLESTEROLEMIA: ICD-10-CM

## 2022-05-25 DIAGNOSIS — E79.0 HYPERURICEMIA: ICD-10-CM

## 2022-05-25 DIAGNOSIS — I10 ESSENTIAL HYPERTENSION: Primary | ICD-10-CM

## 2022-05-25 DIAGNOSIS — I10 ESSENTIAL HYPERTENSION: ICD-10-CM

## 2022-05-25 PROBLEM — I47.1 SVT (SUPRAVENTRICULAR TACHYCARDIA) (HCC): Status: RESOLVED | Noted: 2021-02-02 | Resolved: 2022-05-25

## 2022-05-25 PROBLEM — I47.10 SVT (SUPRAVENTRICULAR TACHYCARDIA): Status: RESOLVED | Noted: 2021-02-02 | Resolved: 2022-05-25

## 2022-05-25 LAB
A/G RATIO: 3.1 (ref 1.1–2.2)
ALBUMIN SERPL-MCNC: 4.7 G/DL (ref 3.4–5)
ALP BLD-CCNC: 69 U/L (ref 40–129)
ALT SERPL-CCNC: 14 U/L (ref 10–40)
ANION GAP SERPL CALCULATED.3IONS-SCNC: 15 MMOL/L (ref 3–16)
AST SERPL-CCNC: 14 U/L (ref 15–37)
BILIRUB SERPL-MCNC: 0.5 MG/DL (ref 0–1)
BUN BLDV-MCNC: 18 MG/DL (ref 7–20)
CALCIUM SERPL-MCNC: 10.1 MG/DL (ref 8.3–10.6)
CHLORIDE BLD-SCNC: 99 MMOL/L (ref 99–110)
CHOLESTEROL, TOTAL: 166 MG/DL (ref 0–199)
CO2: 25 MMOL/L (ref 21–32)
CONTROL: NORMAL
CREAT SERPL-MCNC: 0.9 MG/DL (ref 0.6–1.2)
GFR AFRICAN AMERICAN: >60
GFR NON-AFRICAN AMERICAN: >60
GLUCOSE BLD-MCNC: 102 MG/DL (ref 70–99)
HDLC SERPL-MCNC: 37 MG/DL (ref 40–60)
HEMOCCULT STL QL: NEGATIVE
LDL CHOLESTEROL CALCULATED: 90 MG/DL
POTASSIUM SERPL-SCNC: 4.2 MMOL/L (ref 3.5–5.1)
SODIUM BLD-SCNC: 139 MMOL/L (ref 136–145)
TOTAL PROTEIN: 6.2 G/DL (ref 6.4–8.2)
TRIGL SERPL-MCNC: 196 MG/DL (ref 0–150)
VLDLC SERPL CALC-MCNC: 39 MG/DL

## 2022-05-25 PROCEDURE — 82274 ASSAY TEST FOR BLOOD FECAL: CPT | Performed by: INTERNAL MEDICINE

## 2022-05-25 PROCEDURE — 99214 OFFICE O/P EST MOD 30 MIN: CPT | Performed by: INTERNAL MEDICINE

## 2022-05-25 PROCEDURE — 1123F ACP DISCUSS/DSCN MKR DOCD: CPT | Performed by: INTERNAL MEDICINE

## 2022-05-25 ASSESSMENT — PATIENT HEALTH QUESTIONNAIRE - PHQ9
SUM OF ALL RESPONSES TO PHQ9 QUESTIONS 1 & 2: 0
SUM OF ALL RESPONSES TO PHQ QUESTIONS 1-9: 0
1. LITTLE INTEREST OR PLEASURE IN DOING THINGS: 0
2. FEELING DOWN, DEPRESSED OR HOPELESS: 0

## 2022-05-25 ASSESSMENT — ENCOUNTER SYMPTOMS
SORE THROAT: 0
COUGH: 0

## 2022-05-25 NOTE — PROGRESS NOTES
Chiara Marquez (:  1950) is a 66 y.o. female,Established patient, here for evaluation of the following chief complaint(s):  Hypertension  HLD       ASSESSMENT/PLAN:  .1. Essential hypertension    - Comprehensive Metabolic Panel; Future    2. Pure hypercholesterolemia    - Lipid Panel; Future    3. Impaired fasting blood sugar    - Hemoglobin A1C; Future    4. Encounter for FIT (fecal immunochemical test) screening    - POCT Fecal Immunochemical Test (FIT)    5. Hyperuricemia  -on allopurinol   vbvv    Follow up Oct 17 ,2022 for wellness    Continue meds. Current Outpatient Medications   Medication Sig Dispense Refill    amLODIPine (NORVASC) 5 MG tablet Take 1 tablet by mouth daily 30 tablet 1    atenolol (TENORMIN) 50 MG tablet Take 1 tablet by mouth in the morning and at bedtime 60 tablet 1    famotidine (PEPCID) 20 MG tablet Take 1 tablet by mouth nightly as needed (gerd) 30 tablet 3    lisinopril-hydroCHLOROthiazide (PRINZIDE;ZESTORETIC) 20-12.5 MG per tablet Take 1 tablet by mouth in the morning and at bedtime 60 tablet 1    lovastatin (MEVACOR) 40 MG tablet Take 1 tablet by mouth daily 90 tablet 1    allopurinol (ZYLOPRIM) 100 MG tablet Take 1 tablet by mouth daily For  gout 90 tablet 1    clotrimazole-betamethasone (LOTRISONE) 1-0.05 % cream Apply to affected area twice a day as needed 45 g 1    cetirizine (ZYRTEC) 10 MG tablet Take 0.5 tablets by mouth daily 30 tablet 1    fluticasone (FLONASE) 50 MCG/ACT nasal spray 2 sprays by Each Nostril route daily 16 g 0    ASPIRIN 81 PO Take 1 tablet by mouth daily      Cholecalciferol (VITAMIN D) 2000 UNITS CAPS capsule Take by mouth Indications: otc      citalopram (CELEXA) 10 MG tablet Take 1 tablet by mouth daily (Patient not taking: Reported on 2022) 30 tablet 5     No current facility-administered medications for this visit.          Subjective   SUBJECTIVE/OBJECTIVE:  HPI  CC- HYpertension- controlled with atenolol 50 mg BID, amlodipine 5 mg daily, lisinopril /hctz 20/12.5 mg  1 tab twice a day. No chest pain or shortness of breath. Chemistry        Component Value Date/Time     10/19/2021 1828    K 3.9 10/19/2021 1828    CL 98 (L) 10/19/2021 1828    CO2 26 10/19/2021 1828    BUN 16 10/19/2021 1828    CREATININE 0.8 10/19/2021 1828        Component Value Date/Time    CALCIUM 9.8 10/19/2021 1828    ALKPHOS 76 10/19/2021 1828    AST 18 10/19/2021 1828    ALT 21 10/19/2021 1828    BILITOT 0.5 10/19/2021 1828        . Hyperlipidemia- on lovastatin 40 mg daily, no myalgia  Lab Results   Component Value Date    CHOL 171 06/01/2021    CHOL 168 12/08/2020    CHOL 151 06/24/2020     Lab Results   Component Value Date    TRIG 217 (H) 06/01/2021    TRIG 183 (H) 12/08/2020    TRIG 178 (H) 06/24/2020     Lab Results   Component Value Date    HDL 38 (L) 06/01/2021    HDL 37 (L) 12/08/2020    HDL 36 (L) 06/24/2020     Lab Results   Component Value Date    LDLCALC 90 06/01/2021    LDLCALC 94 12/08/2020    [address] 06/24/2020     Has impaired fssting blood sugar controlled with diet. On allopurinol 100 mg for hyperuricemia. No gout problem. Review of Systems   Constitutional: Negative for activity change. HENT: Negative. Negative for sore throat. Respiratory: Negative for cough. Cardiovascular: Negative for chest pain. Gastrointestinal:        Taking pepcid OTC for gerd   Musculoskeletal: Negative for neck pain. Neurological: Negative for dizziness and headaches. Speech difficulty:    Hematological: Does not bruise/bleed easily. Psychiatric/Behavioral: The patient is not nervous/anxious. Objective   Physical Exam  Vitals and nursing note reviewed. Constitutional:       Appearance: She is well-developed. HENT:      Head: Normocephalic. Right Ear: External ear normal.      Left Ear: External ear normal.      Nose: Nose normal.      Mouth/Throat:      Pharynx: No oropharyngeal exudate.    Neck: Thyroid: No thyromegaly. Cardiovascular:      Rate and Rhythm: Normal rate. Pulmonary:      Effort: Pulmonary effort is normal.   Abdominal:      General: Abdomen is flat. Tenderness: There is no abdominal tenderness. Musculoskeletal:         General: Normal range of motion. Cervical back: Normal range of motion and neck supple. Neurological:      General: No focal deficit present. Psychiatric:         Mood and Affect: Mood normal.            On this date 5/25/2022 I have spent minutes reviewing previous notes, test results and face to face with the patient discussing the diagnosis and importance of compliance with the treatment plan as well as documenting on the day of the visit. An electronic signature was used to authenticate this note.     --Meenu Ang MD

## 2022-05-26 LAB
ESTIMATED AVERAGE GLUCOSE: 114 MG/DL
HBA1C MFR BLD: 5.6 %

## 2022-06-01 ENCOUNTER — TELEPHONE (OUTPATIENT)
Dept: FAMILY MEDICINE CLINIC | Age: 72
End: 2022-06-01

## 2022-06-15 DIAGNOSIS — E79.0 HYPERURICEMIA: ICD-10-CM

## 2022-06-15 DIAGNOSIS — E78.00 PURE HYPERCHOLESTEROLEMIA: ICD-10-CM

## 2022-06-16 NOTE — TELEPHONE ENCOUNTER
Last ov 05/25/2022   Future Appointments   Date Time Provider Praveen Wilkerson   10/17/2022 11:30 AM MD EMERITA Solomon

## 2022-06-17 DIAGNOSIS — I10 ESSENTIAL HYPERTENSION: ICD-10-CM

## 2022-06-17 RX ORDER — ALLOPURINOL 100 MG/1
TABLET ORAL
Qty: 90 TABLET | Refills: 1 | Status: SHIPPED | OUTPATIENT
Start: 2022-06-17 | End: 2022-09-23 | Stop reason: SDUPTHER

## 2022-06-17 RX ORDER — LOVASTATIN 40 MG/1
TABLET ORAL
Qty: 90 TABLET | Refills: 1 | Status: SHIPPED | OUTPATIENT
Start: 2022-06-17 | End: 2022-09-23 | Stop reason: SDUPTHER

## 2022-06-17 RX ORDER — ATENOLOL 50 MG/1
TABLET ORAL
Qty: 60 TABLET | Refills: 3 | Status: SHIPPED | OUTPATIENT
Start: 2022-06-17 | End: 2022-10-11

## 2022-06-17 NOTE — TELEPHONE ENCOUNTER
Future Appointments   Date Time Provider Praveen Wilkerson   10/17/2022 11:30 AM MD EMERITA Lazo - [facility] 5/25/2022

## 2022-07-11 DIAGNOSIS — I10 ESSENTIAL HYPERTENSION: ICD-10-CM

## 2022-07-12 RX ORDER — LISINOPRIL AND HYDROCHLOROTHIAZIDE 20; 12.5 MG/1; MG/1
TABLET ORAL
Qty: 60 TABLET | Refills: 5 | Status: SHIPPED | OUTPATIENT
Start: 2022-07-12 | End: 2022-08-18 | Stop reason: SDUPTHER

## 2022-07-12 RX ORDER — AMLODIPINE BESYLATE 5 MG/1
TABLET ORAL
Qty: 30 TABLET | Refills: 5 | Status: SHIPPED | OUTPATIENT
Start: 2022-07-12 | End: 2022-08-18 | Stop reason: SDUPTHER

## 2022-08-18 ENCOUNTER — TELEPHONE (OUTPATIENT)
Dept: FAMILY MEDICINE CLINIC | Age: 72
End: 2022-08-18

## 2022-08-18 DIAGNOSIS — I10 ESSENTIAL HYPERTENSION: ICD-10-CM

## 2022-08-18 RX ORDER — LISINOPRIL AND HYDROCHLOROTHIAZIDE 20; 12.5 MG/1; MG/1
TABLET ORAL
Qty: 60 TABLET | Refills: 5 | Status: CANCELLED | OUTPATIENT
Start: 2022-08-18

## 2022-08-18 RX ORDER — LISINOPRIL AND HYDROCHLOROTHIAZIDE 20; 12.5 MG/1; MG/1
1 TABLET ORAL 2 TIMES DAILY
Qty: 60 TABLET | Refills: 5 | Status: SHIPPED | OUTPATIENT
Start: 2022-08-18 | End: 2022-10-17

## 2022-08-18 RX ORDER — AMLODIPINE BESYLATE 5 MG/1
TABLET ORAL
Qty: 30 TABLET | Refills: 5 | Status: CANCELLED | OUTPATIENT
Start: 2022-08-18

## 2022-08-18 RX ORDER — AMLODIPINE BESYLATE 5 MG/1
5 TABLET ORAL DAILY
Qty: 30 TABLET | Refills: 5 | Status: SHIPPED | OUTPATIENT
Start: 2022-08-18

## 2022-08-18 NOTE — TELEPHONE ENCOUNTER
Future Appointments   Date Time Provider Praveen Wilkerson   10/17/2022 11:30 AM MD EMERITA Rivera - [facility] 5/25/2022

## 2022-08-18 NOTE — TELEPHONE ENCOUNTER
Patient informed that [facility] Nguyễn did not get amlodipine 5 mg and lisinopril-hydrochlorothiazide refill. [facility]ide called [facility] to transfer medication there. [facility] is out of medication. She asked that rx be sent to [facility]. Future Appointments   Date Time Provider Praveen Wilkerson   10/17/2022 11:30 AM MD EMERITA Walden - [facility]   Past appointment was 5/25/22.

## 2022-08-18 NOTE — TELEPHONE ENCOUNTER
Patient needs a refill on lisinopril-hydroCHLOROthiazide (PRINZIDE;ZESTORETIC) 20-12.5 MG per tablet  . They need a 30 day supply.          Pharmacy: [facility]

## 2022-08-19 RX ORDER — AMLODIPINE BESYLATE 5 MG/1
TABLET ORAL
Qty: 30 TABLET | Refills: 5 | OUTPATIENT
Start: 2022-08-19

## 2022-09-23 ENCOUNTER — TELEPHONE (OUTPATIENT)
Dept: FAMILY MEDICINE CLINIC | Age: 72
End: 2022-09-23

## 2022-09-23 DIAGNOSIS — E79.0 HYPERURICEMIA: ICD-10-CM

## 2022-09-23 DIAGNOSIS — E78.00 PURE HYPERCHOLESTEROLEMIA: ICD-10-CM

## 2022-09-23 RX ORDER — LOVASTATIN 40 MG/1
40 TABLET ORAL NIGHTLY
Qty: 90 TABLET | Refills: 2 | Status: SHIPPED | OUTPATIENT
Start: 2022-09-23

## 2022-09-23 RX ORDER — LOVASTATIN 40 MG/1
TABLET ORAL
Qty: 90 TABLET | Refills: 1 | Status: CANCELLED | OUTPATIENT
Start: 2022-09-23

## 2022-09-23 RX ORDER — ALLOPURINOL 100 MG/1
TABLET ORAL
Qty: 90 TABLET | Refills: 1 | Status: CANCELLED | OUTPATIENT
Start: 2022-09-23

## 2022-09-23 RX ORDER — ALLOPURINOL 100 MG/1
100 TABLET ORAL DAILY
Qty: 90 TABLET | Refills: 2 | Status: SHIPPED | OUTPATIENT
Start: 2022-09-23

## 2022-09-23 NOTE — TELEPHONE ENCOUNTER
Patient needs refill:  (She changed pharmacies)  90 day  Allopurinol 100 mg  Lovastatin 40 mg    [id number] [facility]    5/25/2022 last appt  Future Appointments   Date Time Provider Praveen Wilkerson   10/17/2022 11:30 AM Donna Mays MD [facility]

## 2022-10-11 DIAGNOSIS — I10 ESSENTIAL HYPERTENSION: ICD-10-CM

## 2022-10-11 RX ORDER — ATENOLOL 50 MG/1
TABLET ORAL
Qty: 109 TABLET | Refills: 2 | Status: SHIPPED | OUTPATIENT
Start: 2022-10-11

## 2022-10-11 NOTE — TELEPHONE ENCOUNTER
Future Appointments   Date Time Provider Praveen Wilkerson   10/17/2022 11:30 AM MD EMERITA Lacey - [facility]     LOV 5/25/2022

## 2022-10-17 ENCOUNTER — OFFICE VISIT (OUTPATIENT)
Dept: FAMILY MEDICINE CLINIC | Age: 72
End: 2022-10-17
Payer: COMMERCIAL

## 2022-10-17 VITALS
WEIGHT: 189.8 LBS | BODY MASS INDEX: 31.62 KG/M2 | RESPIRATION RATE: 16 BRPM | SYSTOLIC BLOOD PRESSURE: 130 MMHG | HEART RATE: 51 BPM | TEMPERATURE: 96.9 F | HEIGHT: 65 IN | OXYGEN SATURATION: 99 % | DIASTOLIC BLOOD PRESSURE: 70 MMHG

## 2022-10-17 DIAGNOSIS — Z00.00 MEDICARE ANNUAL WELLNESS VISIT, SUBSEQUENT: ICD-10-CM

## 2022-10-17 DIAGNOSIS — K21.9 GASTROESOPHAGEAL REFLUX DISEASE WITHOUT ESOPHAGITIS: ICD-10-CM

## 2022-10-17 DIAGNOSIS — E78.00 PURE HYPERCHOLESTEROLEMIA: ICD-10-CM

## 2022-10-17 DIAGNOSIS — R73.01 IMPAIRED FASTING BLOOD SUGAR: ICD-10-CM

## 2022-10-17 DIAGNOSIS — E04.2 MULTIPLE THYROID NODULES: ICD-10-CM

## 2022-10-17 DIAGNOSIS — I10 ESSENTIAL HYPERTENSION: ICD-10-CM

## 2022-10-17 DIAGNOSIS — Z00.00 ROUTINE GENERAL MEDICAL EXAMINATION AT A HEALTH CARE FACILITY: Primary | ICD-10-CM

## 2022-10-17 PROCEDURE — G0008 ADMIN INFLUENZA VIRUS VAC: HCPCS | Performed by: INTERNAL MEDICINE

## 2022-10-17 PROCEDURE — G0439 PPPS, SUBSEQ VISIT: HCPCS | Performed by: INTERNAL MEDICINE

## 2022-10-17 PROCEDURE — 1123F ACP DISCUSS/DSCN MKR DOCD: CPT | Performed by: INTERNAL MEDICINE

## 2022-10-17 PROCEDURE — 90694 VACC AIIV4 NO PRSRV 0.5ML IM: CPT | Performed by: INTERNAL MEDICINE

## 2022-10-17 RX ORDER — ZOSTER VACCINE RECOMBINANT, ADJUVANTED 50 MCG/0.5
0.5 KIT INTRAMUSCULAR SEE ADMIN INSTRUCTIONS
Qty: 0.5 ML | Refills: 0 | Status: SHIPPED | OUTPATIENT
Start: 2022-10-17 | End: 2023-04-15

## 2022-10-17 SDOH — ECONOMIC STABILITY: FOOD INSECURITY: WITHIN THE PAST 12 MONTHS, YOU WORRIED THAT YOUR FOOD WOULD RUN OUT BEFORE YOU GOT MONEY TO BUY MORE.: NEVER TRUE

## 2022-10-17 SDOH — ECONOMIC STABILITY: FOOD INSECURITY: WITHIN THE PAST 12 MONTHS, THE FOOD YOU BOUGHT JUST DIDN'T LAST AND YOU DIDN'T HAVE MONEY TO GET MORE.: NEVER TRUE

## 2022-10-17 ASSESSMENT — PATIENT HEALTH QUESTIONNAIRE - PHQ9
SUM OF ALL RESPONSES TO PHQ QUESTIONS 1-9: 0
SUM OF ALL RESPONSES TO PHQ QUESTIONS 1-9: 0
1. LITTLE INTEREST OR PLEASURE IN DOING THINGS: 0
SUM OF ALL RESPONSES TO PHQ9 QUESTIONS 1 & 2: 0
SUM OF ALL RESPONSES TO PHQ QUESTIONS 1-9: 0
SUM OF ALL RESPONSES TO PHQ QUESTIONS 1-9: 0
2. FEELING DOWN, DEPRESSED OR HOPELESS: 0

## 2022-10-17 ASSESSMENT — LIFESTYLE VARIABLES
HOW MANY STANDARD DRINKS CONTAINING ALCOHOL DO YOU HAVE ON A TYPICAL DAY: PATIENT DOES NOT DRINK
HOW OFTEN DO YOU HAVE A DRINK CONTAINING ALCOHOL: NEVER

## 2022-10-17 ASSESSMENT — SOCIAL DETERMINANTS OF HEALTH (SDOH): HOW HARD IS IT FOR YOU TO PAY FOR THE VERY BASICS LIKE FOOD, HOUSING, MEDICAL CARE, AND HEATING?: NOT HARD AT ALL

## 2022-10-17 NOTE — PROGRESS NOTES
Medicare Annual Wellness Visit    Annabella Stovall is here for Medicare AWV    Assessment & Plan            Recommendations for Preventive Services Due: see orders and patient instructions/AVS.  Recommended screening schedule for the next 5-10 years is provided to the patient in written form: see Patient Instructions/AVS.                Patient's complete Health Risk Assessment and screening values have been reviewed and are found in Flowsheets. The following problems were reviewed today and where indicated follow up appointments were made and/or referrals ordered. Positive Risk Factor Screenings with Interventions:             General Health and ACP:  General  In general, how would you say your health is?: Good  In the past 7 days, have you experienced any of the following: New or Increased Pain, New or Increased Fatigue, Loneliness, Social Isolation, Stress or Anger?: No  Do you get the social and emotional support that you need?: Yes  Do you have a Living Will?: Yes    Advance Directives     Power of  Living Will ACP-Advance Directive ACP-Power of     Not on File Not on File Not on File Not on File        General Health Risk Interventions:  none    Health Habits/Nutrition:  Physical Activity: Insufficiently Active    Days of Exercise per Week: 2 days    Minutes of Exercise per Session: 30 min     Have you lost any weight without trying in the past 3 months?: No  Body mass index: (!) 31.39  Have you seen the dentist within the past year?: Yes  Health Habits/Nutrition Interventions:  none    Hearing/Vision:  Do you or your family notice any trouble with your hearing that hasn't been managed with hearing aids?: No  Do you have difficulty driving, watching TV, or doing any of your daily activities because of your eyesight?: No  Have you had an eye exam within the past year?: (!) No  No results found.   Hearing/Vision Interventions:  none            Objective   Vitals:    10/17/22 1131   BP: 130/70 Site: Right Upper Arm   Position: Sitting   Cuff Size: Medium Adult   Pulse: 51   Resp: 16   Temp: 96.9 °F (36.1 °C)   TempSrc: Temporal   SpO2: 99%   Weight: 189 lb 12.8 oz (86.1 kg)   Height: 5' 5.2\" (1.656 m)      Body mass index is 31.39 kg/m². No Known Allergies  Prior to Visit Medications    Medication Sig Taking?  Authorizing Provider   zoster recombinant adjuvanted vaccine [facility]) 50 MCG/0.5ML SUSR injection Inject 0.5 mLs into the muscle See Admin Instructions 1 dose now and repeat in 2-6 months Yes Malaika Carney MD   atenolol (TENORMIN) 50 MG tablet TAKE ONE TABLET BY MOUTH EVERY MORNING AND TAKE ONE TABLET AT BEDTIME Yes Malaika Carney MD   lovastatin (MEVACOR) 40 MG tablet Take 1 tablet by mouth nightly Yes Malaika Carney MD   allopurinol (ZYLOPRIM) 100 MG tablet Take 1 tablet by mouth daily Yes Malaika Carney MD   lisinopril-hydroCHLOROthiazide (PRINZIDE;ZESTORETIC) 20-12.5 MG per tablet Take 1 tablet by mouth 2 times daily Yes Malaika Carney MD   amLODIPine (NORVASC) 5 MG tablet Take 1 tablet by mouth daily Yes Malaika Carney MD   famotidine (PEPCID) 20 MG tablet Take 1 tablet by mouth nightly as needed (gerd) Yes Malaika Carney MD   clotrimazole-betamethasone (LOTRISONE) 1-0.05 % cream Apply to affected area twice a day as needed Yes Malaika Carney MD   cetirizine (ZYRTEC) 10 MG tablet Take 0.5 tablets by mouth daily Yes Malaika Carney MD   ASPIRIN 81 PO Take 1 tablet by mouth daily Yes Historical Provider, MD   Cholecalciferol (VITAMIN D) 2000 UNITS CAPS capsule Take by mouth Indications: otc Yes Historical Provider, MD   citalopram (CELEXA) 10 MG tablet Take 1 tablet by mouth daily  Patient not taking: No sig reported  Malaika Carney MD   fluticasone Laine Nguyen) 50 MCG/ACT nasal spray 2 sprays by Each Nostril route daily  Patient not taking: Reported on 10/17/2022  Malaika Carney MD   famotidine (PEPCID) 20 MG tablet Take 1 tablet by mouth daily  Malaika Carney MD [facility] (Including outside providers/suppliers regularly involved in providing care):   Patient Care Team:  Charity Deal MD as PCP - General (Family Medicine)  Charity Deal MD as PCP - [address] [place] [address] Dr Lazo Provider     Reviewed and updated this visit:  Tobacco  Allergies  Meds  Problems  Med Hx  Surg Hx  Soc Hx  Fam Hx                 Medicare Annual Wellness Visit    Nick Campuzano is here for Medicare AWV    Assessment & Plan   Essential hypertension  -     Basic Metabolic Panel; Future  Pure hypercholesterolemia  Impaired fasting blood sugar  -     Basic Metabolic Panel; Future  Medicare annual wellness visit, subsequent  Routine general medical examination at a health care facility    Recommendations for Preventive Services Due: see orders and patient instructions/AVS.  Recommended screening schedule for the next 5-10 years is provided to the patient in written form: see Patient Instructions/AVS.     Return in 5 months (on 3/7/2023) for Medicare Annual Wellness Visit in 1 year. Subjective       Patient's complete Health Risk Assessment and screening values have been reviewed and are found in Flowsheets. The following problems were reviewed today and where indicated follow up appointments were made and/or referrals ordered.     Positive Risk Factor Screenings with Interventions:             General Health and ACP:  General  In general, how would you say your health is?: Good  In the past 7 days, have you experienced any of the following: New or Increased Pain, New or Increased Fatigue, Loneliness, Social Isolation, Stress or Anger?: No  Do you get the social and emotional support that you need?: Yes  Do you have a Living Will?: Yes    Advance Directives     Power of  Living Will ACP-Advance Directive ACP-Power of     Not on File Not on File Not on File Not on File        General Health Risk Interventions:      Health Habits/Nutrition:  Physical Activity: Insufficiently Active    Days of Exercise per Week: 2 days    Minutes of Exercise per Session: 30 min     Have you lost any weight without trying in the past 3 months?: No  Body mass index: (!) 31.39  Have you seen the dentist within the past year?: Yes  Health Habits/Nutrition Interventions:      Hearing/Vision:  Do you or your family notice any trouble with your hearing that hasn't been managed with hearing aids?: No  Do you have difficulty driving, watching TV, or doing any of your daily activities because of your eyesight?: No  Have you had an eye exam within the past year?: (!) No  No results found. Hearing/Vision Interventions:              Objective   Vitals:    10/17/22 1131   BP: 130/70   Site: Right Upper Arm   Position: Sitting   Cuff Size: Medium Adult   Pulse: 51   Resp: 16   Temp: 96.9 °F (36.1 °C)   TempSrc: Temporal   SpO2: 99%   Weight: 189 lb 12.8 oz (86.1 kg)   Height: 5' 5.2\" (1.656 m)      Body mass index is 31.39 kg/m². No Known Allergies  Prior to Visit Medications    Medication Sig Taking?  Authorizing Provider   zoster recombinant adjuvanted vaccine [facility]) 50 MCG/0.5ML SUSR injection Inject 0.5 mLs into the muscle See Admin Instructions 1 dose now and repeat in 2-6 months Yes Joby Ovalle MD   atenolol (TENORMIN) 50 MG tablet TAKE ONE TABLET BY MOUTH EVERY MORNING AND TAKE ONE TABLET AT BEDTIME Yes Joby Ovalle MD   lovastatin (MEVACOR) 40 MG tablet Take 1 tablet by mouth nightly Yes Joby Ovalle MD   allopurinol (ZYLOPRIM) 100 MG tablet Take 1 tablet by mouth daily Yes Joby Ovalle MD   lisinopril-hydroCHLOROthiazide (PRINZIDE;ZESTORETIC) 20-12.5 MG per tablet Take 1 tablet by mouth 2 times daily Yes Joby Ovalle MD   amLODIPine (NORVASC) 5 MG tablet Take 1 tablet by mouth daily Yes Joby Ovalle MD   famotidine (PEPCID) 20 MG tablet Take 1 tablet by mouth nightly as needed (gerd) Yes Joby Ovalle MD   clotrimazole-betamethasone (LOTRISONE) 1-0.05 % cream Apply to affected area twice a day as needed Yes Marin Acosta MD   cetirizine (ZYRTEC) 10 MG tablet Take 0.5 tablets by mouth daily Yes Marin Acosta MD   ASPIRIN 81 PO Take 1 tablet by mouth daily Yes Historical Provider, MD   Cholecalciferol (VITAMIN D) 2000 UNITS CAPS capsule Take by mouth Indications: otc Yes Historical Provider, MD   citalopram (CELEXA) 10 MG tablet Take 1 tablet by mouth daily  Patient not taking: No sig reported  Marin Acosta MD   fluticasone Marita Duck) 50 MCG/ACT nasal spray 2 sprays by Each Nostril route daily  Patient not taking: Reported on 10/17/2022  Marin Acosta MD   famotidine (PEPCID) 20 MG tablet Take 1 tablet by mouth daily  Marin Acosta MD       [facility] (Including outside providers/suppliers regularly involved in providing care):   Patient Care Team:  Marin Acosta MD as PCP - General (Family Medicine)  Marin Acosta MD as PCP - [facility] Empaneled Provider     Reviewed and updated this visit:  Tobacco  Allergies  Meds  Problems  Med Hx  Surg Hx  Soc Hx  Fam Hx

## 2022-10-19 DIAGNOSIS — K21.9 GASTROESOPHAGEAL REFLUX DISEASE WITHOUT ESOPHAGITIS: ICD-10-CM

## 2022-10-19 RX ORDER — FAMOTIDINE 20 MG/1
TABLET, FILM COATED ORAL
Qty: 60 TABLET | Refills: 0 | Status: SHIPPED | OUTPATIENT
Start: 2022-10-19

## 2022-10-19 NOTE — TELEPHONE ENCOUNTER
10/17/2022      Future Appointments   Date Time Provider Praveen Wilkerson   3/8/2023 11:30 AM MD EMERITA Santiago

## 2022-12-15 DIAGNOSIS — K21.9 GASTROESOPHAGEAL REFLUX DISEASE WITHOUT ESOPHAGITIS: ICD-10-CM

## 2022-12-15 RX ORDER — FAMOTIDINE 20 MG/1
TABLET, FILM COATED ORAL
Qty: 60 TABLET | Refills: 0 | Status: SHIPPED | OUTPATIENT
Start: 2022-12-15

## 2022-12-15 NOTE — TELEPHONE ENCOUNTER
10/17/2022    Future Appointments   Date Time Provider Praveen Wilkerson   3/8/2023 11:30 AM MD EMERITA Valenzuela

## 2023-01-09 DIAGNOSIS — I10 ESSENTIAL HYPERTENSION: ICD-10-CM

## 2023-01-09 RX ORDER — AMLODIPINE BESYLATE 5 MG/1
TABLET ORAL
Qty: 30 TABLET | Refills: 2 | Status: SHIPPED | OUTPATIENT
Start: 2023-01-09

## 2023-01-09 NOTE — TELEPHONE ENCOUNTER
10/17/2022    Future Appointments   Date Time Provider Praveen Wilkerson   3/8/2023 11:30 AM MD EMERITA Urbina

## 2023-01-16 DIAGNOSIS — I10 ESSENTIAL HYPERTENSION: ICD-10-CM

## 2023-01-16 RX ORDER — LISINOPRIL AND HYDROCHLOROTHIAZIDE 20; 12.5 MG/1; MG/1
TABLET ORAL
Qty: 60 TABLET | Refills: 4 | Status: SHIPPED | OUTPATIENT
Start: 2023-01-16

## 2023-01-16 NOTE — TELEPHONE ENCOUNTER
Future Appointments   Date Time Provider Praveen Wilkerson   3/8/2023 11:30 AM MD EMERITA Story - LILIBETH DANIEL 10/17/2022

## 2023-02-08 ENCOUNTER — SCHEDULED TELEPHONE ENCOUNTER (OUTPATIENT)
Dept: PRIMARY CARE CLINIC | Age: 73
End: 2023-02-08
Payer: COMMERCIAL

## 2023-02-08 DIAGNOSIS — R05.1 ACUTE COUGH: ICD-10-CM

## 2023-02-08 DIAGNOSIS — R09.81 NASAL CONGESTION: ICD-10-CM

## 2023-02-08 DIAGNOSIS — J01.01 ACUTE RECURRENT MAXILLARY SINUSITIS: Primary | ICD-10-CM

## 2023-02-08 DIAGNOSIS — J30.2 SEASONAL ALLERGIC RHINITIS, UNSPECIFIED TRIGGER: ICD-10-CM

## 2023-02-08 PROCEDURE — 99442 PR PHYS/QHP TELEPHONE EVALUATION 11-20 MIN: CPT | Performed by: NURSE PRACTITIONER

## 2023-02-08 RX ORDER — AMOXICILLIN AND CLAVULANATE POTASSIUM 875; 125 MG/1; MG/1
1 TABLET, FILM COATED ORAL 2 TIMES DAILY WITH MEALS
Qty: 20 TABLET | Refills: 0 | Status: SHIPPED | OUTPATIENT
Start: 2023-02-08 | End: 2023-02-18

## 2023-02-08 RX ORDER — BENZONATATE 100 MG/1
100 CAPSULE ORAL 3 TIMES DAILY PRN
Qty: 30 CAPSULE | Refills: 0 | Status: SHIPPED | OUTPATIENT
Start: 2023-02-08 | End: 2023-02-18

## 2023-02-08 RX ORDER — GREEN TEA/HOODIA GORDONII 315-12.5MG
1 CAPSULE ORAL DAILY
Qty: 30 TABLET | Refills: 0 | Status: SHIPPED | OUTPATIENT
Start: 2023-02-08 | End: 2023-03-10

## 2023-02-08 RX ORDER — FLUTICASONE PROPIONATE 50 MCG
2 SPRAY, SUSPENSION (ML) NASAL DAILY
Qty: 16 G | Refills: 0 | Status: SHIPPED | OUTPATIENT
Start: 2023-02-08

## 2023-02-08 ASSESSMENT — ENCOUNTER SYMPTOMS
SORE THROAT: 1
SINUS PAIN: 1
COUGH: 1
SINUS PRESSURE: 1

## 2023-02-08 NOTE — LETTER
I had the pleasure of seeing Marc Carbone today for a primary care Virtualist video visit. Our team would love your overall feedback on this visit. Please hit shift and click the following link to let us know if the Virtualist service met your expectations. [email or web address]      Electronically signed by RADHA Jacobs CNP on 2/8/23 at 2:13 PM EST.

## 2023-02-08 NOTE — PROGRESS NOTES
Gill Paredes is a 74 y.o. female evaluated via telephone on 2/8/2023 for Other (Sinus pain and pressure in the maxillary area along with nasal congestion with yellow drainage, nonproductive cough with postnasal drip, ears and throat are hurting/Symptoms started 2 weeks ago/Negative at home COVID test)  . Assessment & Plan   1. Acute recurrent maxillary sinusitis  Problem  -     amoxicillin-clavulanate (AUGMENTIN) 875-125 MG per tablet; Take 1 tablet by mouth 2 times daily (with meals) for 10 days, Disp-20 tablet, R-0Normal  -     fluticasone (FLONASE) 50 MCG/ACT nasal spray; 2 sprays by Each Nostril route daily, Disp-16 g, R-0Normal  See patient instructions    2. Seasonal allergic rhinitis, unspecified trigger  Problem  -     fluticasone (FLONASE) 50 MCG/ACT nasal spray; 2 sprays by Each Nostril route daily, Disp-16 g, R-0Normal  Continue over-the-counter Zyrtec or allergy pill that you have been taking    3. Nasal congestion  Problem  -     fluticasone (FLONASE) 50 MCG/ACT nasal spray; 2 sprays by Each Nostril route daily, Disp-16 g, R-0Normal    4. Acute cough  Problem  -     benzonatate (TESSALON) 100 MG capsule; Take 1 capsule by mouth 3 times daily as needed for Cough, Disp-30 capsule, R-0Normal  See patient instructions    Follow-up with PCP if symptoms do not improve or if they worsen       Subjective   Prior to Visit Medications    Medication Sig Taking?  Authorizing Provider   Probiotic Acidophilus (FLORANEX) TABS Take 1 tablet by mouth daily Yes Anamika Rincon APRN - ALEJANDRA   amoxicillin-clavulanate (AUGMENTIN) 875-125 MG per tablet Take 1 tablet by mouth 2 times daily (with meals) for 10 days Yes RADHA Foster - CNP   fluticasone (FLONASE) 50 MCG/ACT nasal spray 2 sprays by Each Nostril route daily Yes Anamika Rincon APRN - CNP   benzonatate (TESSALON) 100 MG capsule Take 1 capsule by mouth 3 times daily as needed for Cough Yes RADHA Foster - CNP lisinopril-hydroCHLOROthiazide (PRINZIDE;ZESTORETIC) 20-12.5 MG per tablet TAKE ONE TABLET BY MOUTH EVERY MORNING AND TAKE ONE TABLET BY MOUTH AT BEDTIME  Anthony Arechiga MD   amLODIPine (NORVASC) 5 MG tablet TAKE ONE TABLET BY MOUTH EVERY DAY  Anthony Arechiga MD   famotidine (PEPCID) 20 MG tablet TAKE ONE TABLET BY MOUTH EVERY EVENING AS NEEDED (gerd)  Terrea Meka, APRN - CNP   zoster recombinant adjuvanted vaccine [facility]) 50 MCG/0.5ML SUSR injection Inject 0.5 mLs into the muscle See Admin Instructions 1 dose now and repeat in 2-6 months  Anthony Arechiga MD   atenolol (TENORMIN) 50 MG tablet TAKE ONE TABLET BY MOUTH EVERY MORNING AND TAKE ONE TABLET AT BEDTIME  Anthony Arechiga MD   lovastatin (MEVACOR) 40 MG tablet Take 1 tablet by mouth nightly  Anthony Arechiga MD   allopurinol (ZYLOPRIM) 100 MG tablet Take 1 tablet by mouth daily  Anthony Arechiga MD   citalopram (CELEXA) 10 MG tablet Take 1 tablet by mouth daily  Patient not taking: No sig reported  Anthony Arechiga MD   clotrimazole-betamethasone (LOTRISONE) 1-0.05 % cream Apply to affected area twice a day as needed  Anthony Arechiga MD   cetirizine (ZYRTEC) 10 MG tablet Take 0.5 tablets by mouth daily  Anthony Arechiga MD   famotidine (PEPCID) 20 MG tablet Take 1 tablet by mouth daily  Anthony Arechiga MD   ASPIRIN 81 PO Take 1 tablet by mouth daily  Historical Provider, MD   Cholecalciferol (VITAMIN D) 2000 UNITS CAPS capsule Take by mouth Indications: otc  Historical Provider, MD     This is a 77-year-old female patient of Dr. Yoanna Henry consenting to a telephone visit today  She has complaints of sinus pain and pressure in the maxillary area along with nasal congestion with yellow drainage, she also is experiencing postnasal drip, nonproductive cough, ears and throat are hurting x2 weeks now. She did an at home COVID test that was negative. She has been treating herself with Delsym and an allergy pill Zyrtec with no relief.   She states that she has been suffering with this too long now      Review of Systems   HENT:  Positive for congestion, ear pain, sinus pressure, sinus pain and sore throat. Respiratory:  Positive for cough. All other systems reviewed and are negative. Objective   Patient-Reported Vitals  No data recorded       Total Time: minutes: 11-20 minutes     Surya Tello was evaluated through a synchronous (real-time) audio only encounter. Patient identification was verified at the start of the visit. She (or guardian if applicable) is aware that this is a billable service, which includes applicable co-pays. This visit was conducted with patient's (and/or legal guardian's) verbal consent. She has not had a related appointment within my department in the past 7 days or scheduled within the next 24 hours. The patient was located at Home: [address] [place] [address].   The provider was located at Home ([facility]): [address] [place] [address], APRN - [facility]

## 2023-02-08 NOTE — Clinical Note
I had the pleasure of seeing Maritza Walton today for a primary care Virtualist video visit. Our team would love your overall feedback on this visit. Please hit shift and click the following link to let us know if the Virtualist service met your expectations. [email or web address]   Electronically signed by RADHA Patel CNP on 2/8/23 at 2:13 PM EST.

## 2023-02-09 DIAGNOSIS — I10 ESSENTIAL HYPERTENSION: ICD-10-CM

## 2023-02-09 RX ORDER — LISINOPRIL AND HYDROCHLOROTHIAZIDE 20; 12.5 MG/1; MG/1
1 TABLET ORAL DAILY
Qty: 180 TABLET | Refills: 3 | Status: SHIPPED | OUTPATIENT
Start: 2023-02-09

## 2023-02-09 NOTE — TELEPHONE ENCOUNTER
[facility] REQUESTING SCRIPT OF 1/26/23 FOR LISINOPRIL-HCTZ BE SWITCHED TO A 90 DAY SUPPLY.     10/17/2022     Future Appointments   Date Time Provider Praveen Wilkerson   3/8/2023 11:30 AM MD EMERITA Blanco

## 2023-03-08 ENCOUNTER — OFFICE VISIT (OUTPATIENT)
Dept: FAMILY MEDICINE CLINIC | Age: 73
End: 2023-03-08

## 2023-03-08 VITALS
HEART RATE: 52 BPM | RESPIRATION RATE: 16 BRPM | WEIGHT: 188.4 LBS | BODY MASS INDEX: 31.39 KG/M2 | SYSTOLIC BLOOD PRESSURE: 134 MMHG | TEMPERATURE: 97 F | DIASTOLIC BLOOD PRESSURE: 80 MMHG | HEIGHT: 65 IN | OXYGEN SATURATION: 98 %

## 2023-03-08 DIAGNOSIS — E79.0 HYPERURICEMIA: ICD-10-CM

## 2023-03-08 DIAGNOSIS — R73.01 IMPAIRED FASTING BLOOD SUGAR: ICD-10-CM

## 2023-03-08 DIAGNOSIS — K21.9 GASTROESOPHAGEAL REFLUX DISEASE WITHOUT ESOPHAGITIS: ICD-10-CM

## 2023-03-08 DIAGNOSIS — I10 ESSENTIAL HYPERTENSION: ICD-10-CM

## 2023-03-08 DIAGNOSIS — I10 ESSENTIAL HYPERTENSION: Primary | ICD-10-CM

## 2023-03-08 DIAGNOSIS — E78.00 PURE HYPERCHOLESTEROLEMIA: ICD-10-CM

## 2023-03-08 DIAGNOSIS — N30.01 ACUTE CYSTITIS WITH HEMATURIA: ICD-10-CM

## 2023-03-08 LAB
A/G RATIO: 2 (ref 1.1–2.2)
ALBUMIN SERPL-MCNC: 4.2 G/DL (ref 3.4–5)
ALP BLD-CCNC: 73 U/L (ref 40–129)
ALT SERPL-CCNC: 14 U/L (ref 10–40)
ANION GAP SERPL CALCULATED.3IONS-SCNC: 12 MMOL/L (ref 3–16)
AST SERPL-CCNC: 15 U/L (ref 15–37)
BILIRUB SERPL-MCNC: 0.5 MG/DL (ref 0–1)
BILIRUBIN, POC: NORMAL
BLOOD URINE, POC: NORMAL
BUN BLDV-MCNC: 18 MG/DL (ref 7–20)
CALCIUM SERPL-MCNC: 9.4 MG/DL (ref 8.3–10.6)
CHLORIDE BLD-SCNC: 101 MMOL/L (ref 99–110)
CHOLESTEROL, TOTAL: 180 MG/DL (ref 0–199)
CLARITY, POC: NORMAL
CO2: 25 MMOL/L (ref 21–32)
COLOR, POC: NORMAL
CREAT SERPL-MCNC: 0.8 MG/DL (ref 0.6–1.2)
GFR SERPL CREATININE-BSD FRML MDRD: >60 ML/MIN/{1.73_M2}
GLUCOSE BLD-MCNC: 106 MG/DL (ref 70–99)
GLUCOSE URINE, POC: NORMAL
HDLC SERPL-MCNC: 38 MG/DL (ref 40–60)
KETONES, POC: NORMAL
LDL CHOLESTEROL CALCULATED: 94 MG/DL
LEUKOCYTE EST, POC: NORMAL
NITRITE, POC: NORMAL
PH, POC: 6
POTASSIUM SERPL-SCNC: 4.2 MMOL/L (ref 3.5–5.1)
PROTEIN, POC: NORMAL
SODIUM BLD-SCNC: 138 MMOL/L (ref 136–145)
SPECIFIC GRAVITY, POC: 1.02
TOTAL PROTEIN: 6.3 G/DL (ref 6.4–8.2)
TRIGL SERPL-MCNC: 239 MG/DL (ref 0–150)
UROBILINOGEN, POC: 0.2
VLDLC SERPL CALC-MCNC: 48 MG/DL

## 2023-03-08 RX ORDER — NITROFURANTOIN 25; 75 MG/1; MG/1
100 CAPSULE ORAL 2 TIMES DAILY
Qty: 14 CAPSULE | Refills: 0 | Status: SHIPPED | OUTPATIENT
Start: 2023-03-08 | End: 2023-03-15

## 2023-03-08 SDOH — ECONOMIC STABILITY: INCOME INSECURITY: HOW HARD IS IT FOR YOU TO PAY FOR THE VERY BASICS LIKE FOOD, HOUSING, MEDICAL CARE, AND HEATING?: NOT HARD AT ALL

## 2023-03-08 SDOH — ECONOMIC STABILITY: FOOD INSECURITY: WITHIN THE PAST 12 MONTHS, THE FOOD YOU BOUGHT JUST DIDN'T LAST AND YOU DIDN'T HAVE MONEY TO GET MORE.: OFTEN TRUE

## 2023-03-08 SDOH — ECONOMIC STABILITY: FOOD INSECURITY: WITHIN THE PAST 12 MONTHS, YOU WORRIED THAT YOUR FOOD WOULD RUN OUT BEFORE YOU GOT MONEY TO BUY MORE.: OFTEN TRUE

## 2023-03-08 SDOH — ECONOMIC STABILITY: HOUSING INSECURITY
IN THE LAST 12 MONTHS, WAS THERE A TIME WHEN YOU DID NOT HAVE A STEADY PLACE TO SLEEP OR SLEPT IN A SHELTER (INCLUDING NOW)?: NO

## 2023-03-08 ASSESSMENT — PATIENT HEALTH QUESTIONNAIRE - PHQ9
1. LITTLE INTEREST OR PLEASURE IN DOING THINGS: 0
SUM OF ALL RESPONSES TO PHQ QUESTIONS 1-9: 0
SUM OF ALL RESPONSES TO PHQ QUESTIONS 1-9: 0
SUM OF ALL RESPONSES TO PHQ9 QUESTIONS 1 & 2: 0
2. FEELING DOWN, DEPRESSED OR HOPELESS: 0
SUM OF ALL RESPONSES TO PHQ QUESTIONS 1-9: 0
SUM OF ALL RESPONSES TO PHQ QUESTIONS 1-9: 0

## 2023-03-10 LAB
ORGANISM: ABNORMAL
URINE CULTURE, ROUTINE: ABNORMAL

## 2023-03-12 ASSESSMENT — ENCOUNTER SYMPTOMS
SORE THROAT: 0
COUGH: 0

## 2023-03-13 NOTE — PROGRESS NOTES
2        Dee Coe (: 1950) is a 74 y.o. female, here for evaluation of the following medical concerns:    HPI;  Acute cystitis- has symptoms of frequency and dysuria  for the past few days  Urine dipstick shows small blood and small leucocytes. .  Micro exam: not done. Urine culture sent. Started on macrobid pending culture. Treatment Adherence:   Medication compliance:  compliant most of the time  Diet compliance:  compliant most of the time  Weight trend: stable  Current exercise: no regular exercise  Barriers: none    Hypertension:  Home blood pressure monitoring: Yes -. Patient denies chest pain and shortness of breath. Antihypertensive medication side effects: no medication side effects noted. Use of agents associated with hypertension: none. Sodium (mmol/L)   Date Value   2023 138    BUN (mg/dL)   Date Value   2023 18    Glucose (mg/dL)   Date Value   2023 106 (H)      Potassium (mmol/L)   Date Value   2023 4.2     Potassium reflex Magnesium (mmol/L)   Date Value   10/19/2021 3.9    Creatinine (mg/dL)   Date Value   2023 0.8         Hyperlipidemia:  No new myalgias or GI upset on mevacor 40 mg. Lab Results   Component Value Date    CHOL 180 2023    TRIG 239 (H) 2023    HDL 38 (L) 2023    LDLCALC 94 2023     Lab Results   Component Value Date    ALT 14 2023    AST 15 2023        HYperuricemia controlled with allopurinol 73602 mg daily. IMpaired fasting blood sugar  controlled with diet. Review of Systems   Constitutional:  Negative for activity change. HENT: Negative. Negative for sore throat. Respiratory:  Negative for cough. Cardiovascular:  Negative for chest pain. Gastrointestinal:         Taking pepcid OTC for gerd   Genitourinary:  Positive for decreased urine volume, dysuria, frequency and urgency. Musculoskeletal:  Negative for neck pain. Neurological:  Negative for dizziness and headaches. Speech difficulty: . Hematological:  Does not bruise/bleed easily. Psychiatric/Behavioral:  The patient is not nervous/anxious. Current Outpatient Medications   Medication Sig Dispense Refill    nitrofurantoin, macrocrystal-monohydrate, (MACROBID) 100 MG capsule Take 1 capsule by mouth 2 times daily for 7 days 14 capsule 0    lisinopril-hydroCHLOROthiazide (PRINZIDE;ZESTORETIC) 20-12.5 MG per tablet Take 1 tablet by mouth daily 180 tablet 3    fluticasone (FLONASE) 50 MCG/ACT nasal spray 2 sprays by Each Nostril route daily 16 g 0    amLODIPine (NORVASC) 5 MG tablet TAKE ONE TABLET BY MOUTH EVERY DAY 30 tablet 2    famotidine (PEPCID) 20 MG tablet TAKE ONE TABLET BY MOUTH EVERY EVENING AS NEEDED (gerd) 60 tablet 0    atenolol (TENORMIN) 50 MG tablet TAKE ONE TABLET BY MOUTH EVERY MORNING AND TAKE ONE TABLET AT BEDTIME 109 tablet 2    lovastatin (MEVACOR) 40 MG tablet Take 1 tablet by mouth nightly 90 tablet 2    allopurinol (ZYLOPRIM) 100 MG tablet Take 1 tablet by mouth daily 90 tablet 2    clotrimazole-betamethasone (LOTRISONE) 1-0.05 % cream Apply to affected area twice a day as needed 45 g 1    cetirizine (ZYRTEC) 10 MG tablet Take 0.5 tablets by mouth daily 30 tablet 1    ASPIRIN 81 PO Take 1 tablet by mouth daily      Cholecalciferol (VITAMIN D) 2000 UNITS CAPS capsule Take by mouth Indications: otc      citalopram (CELEXA) 10 MG tablet Take 1 tablet by mouth daily (Patient not taking: Reported on 3/8/2023) 30 tablet 5     No current facility-administered medications for this visit. Social History     Socioeconomic History    Marital status:       Spouse name: Not on file    Number of children: Not on file    Years of education: Not on file    Highest education level: Not on file   Occupational History    Not on file   Tobacco Use    Smoking status: Never    Smokeless tobacco: Never   Vaping Use    Vaping Use: Never used   Substance and Sexual Activity    Alcohol use: No    Drug use: No    Sexual activity: Yes     Partners: Male   Other Topics Concern    Not on file   Social History Narrative    Not on file     Social Determinants of Health     Financial Resource Strain: Low Risk     Difficulty of Paying Living Expenses: Not hard at all   Food Insecurity: Food Insecurity Present    Worried About [address] in the Last Year: Often true    [address] in the Last Year: Often true   Transportation Needs: Unknown    Lack of Transportation (Medical): Not on file    Lack of Transportation (Non-Medical): No   Physical Activity: Insufficiently Active    Days of Exercise per Week: 2 days    Minutes of Exercise per Session: 30 min   Stress: Not on file   Social Connections: Not on file   Intimate Partner Violence: Not on file   Housing Stability: Unknown    Unable to Pay for Housing in the Last Year: Not on file    Number of Places Lived in the Last Year: Not on file    Unstable Housing in the Last Year: No       Vitals:    03/08/23 1132   BP: 134/80   Pulse: 52   Resp: 16   Temp: 97 °F (36.1 °C)   SpO2: 98%        Body mass index is 31.16 kg/m². Physical Exam  Vitals and nursing note reviewed. Constitutional:       Appearance: She is well-developed. HENT:      Head: Normocephalic. Right Ear: External ear normal.      Left Ear: External ear normal.      Nose: Nose normal.      Mouth/Throat:      Pharynx: No oropharyngeal exudate. Neck:      Thyroid: No thyromegaly. Cardiovascular:      Rate and Rhythm: Normal rate. Pulmonary:      Effort: Pulmonary effort is normal.   Abdominal:      General: Abdomen is flat. Tenderness: There is no abdominal tenderness. Musculoskeletal:         General: Normal range of motion. Cervical back: Normal range of motion and neck supple. Neurological:      General: No focal deficit present. Psychiatric:         Mood and Affect: Mood normal.     ASSESSMENT/PLAN:  1.  Acute cystitis with hematuria    - Culture, Urine  - POCT Urinalysis No Micro (Auto)  - nitrofurantoin, macrocrystal-monohydrate, (MACROBID) 100 MG capsule; Take 1 capsule by mouth 2 times daily for 7 days  Dispense: 14 capsule; Refill: 0    2. Essential hypertension    - Comprehensive Metabolic Panel; Future    3. Impaired fasting blood sugar    - Comprehensive Metabolic Panel; Future    4. Hyperuricemia    -on allopurinol 100 mg daily    5. Gastroesophageal reflux disease without esophagitis  -taking pepcid OTC    6. Pure hypercholesterolemia    - Lipid Panel; Future      Reviewed previous notes, tests results and face to face with the patient discussing the diagnosis and importance  of compliance with the treatments as well as documenting on the day of visit. Answered questions and encouraged to call  for any other concerns. Return in about 5 months (around 8/1/2023). An electronic signature was used to authenticate this note.     --Kirstin Walters MD on 03/12/23 at 9:45 PM

## 2023-03-22 DIAGNOSIS — I10 ESSENTIAL HYPERTENSION: ICD-10-CM

## 2023-03-22 RX ORDER — ATENOLOL 50 MG/1
TABLET ORAL
Qty: 180 TABLET | Refills: 2 | Status: SHIPPED | OUTPATIENT
Start: 2023-03-22

## 2023-03-22 NOTE — TELEPHONE ENCOUNTER
3/8/2023    Future Appointments   Date Time Provider Praveen Wilkerson   8/1/2023 11:00 AM MD EMERITA Addison

## 2023-03-29 DIAGNOSIS — K21.9 GASTROESOPHAGEAL REFLUX DISEASE WITHOUT ESOPHAGITIS: ICD-10-CM

## 2023-03-31 ENCOUNTER — TELEPHONE (OUTPATIENT)
Dept: FAMILY MEDICINE CLINIC | Age: 73
End: 2023-03-31

## 2023-03-31 DIAGNOSIS — I10 ESSENTIAL HYPERTENSION: ICD-10-CM

## 2023-03-31 RX ORDER — LISINOPRIL AND HYDROCHLOROTHIAZIDE 20; 12.5 MG/1; MG/1
1 TABLET ORAL 2 TIMES DAILY
Qty: 180 TABLET | Refills: 3 | Status: SHIPPED | OUTPATIENT
Start: 2023-03-31

## 2023-03-31 RX ORDER — FAMOTIDINE 20 MG/1
TABLET, FILM COATED ORAL
Qty: 60 TABLET | Refills: 3 | Status: SHIPPED | OUTPATIENT
Start: 2023-03-31

## 2023-04-17 DIAGNOSIS — I10 ESSENTIAL HYPERTENSION: ICD-10-CM

## 2023-04-18 RX ORDER — AMLODIPINE BESYLATE 5 MG/1
TABLET ORAL
Qty: 30 TABLET | Refills: 2 | Status: SHIPPED | OUTPATIENT
Start: 2023-04-18

## 2023-06-29 DIAGNOSIS — E79.0 HYPERURICEMIA: ICD-10-CM

## 2023-06-29 RX ORDER — ALLOPURINOL 100 MG/1
TABLET ORAL
Qty: 90 TABLET | Refills: 2 | Status: SHIPPED | OUTPATIENT
Start: 2023-06-29

## 2023-07-12 DIAGNOSIS — I10 ESSENTIAL HYPERTENSION: ICD-10-CM

## 2023-07-12 DIAGNOSIS — E78.00 PURE HYPERCHOLESTEROLEMIA: ICD-10-CM

## 2023-07-12 RX ORDER — LOVASTATIN 40 MG/1
TABLET ORAL
Qty: 90 TABLET | Refills: 2 | Status: SHIPPED | OUTPATIENT
Start: 2023-07-12

## 2023-07-12 RX ORDER — AMLODIPINE BESYLATE 5 MG/1
TABLET ORAL
Qty: 90 TABLET | Refills: 2 | Status: SHIPPED | OUTPATIENT
Start: 2023-07-12

## 2023-08-03 ENCOUNTER — OFFICE VISIT (OUTPATIENT)
Dept: FAMILY MEDICINE CLINIC | Age: 73
End: 2023-08-03

## 2023-08-03 VITALS
WEIGHT: 189.4 LBS | BODY MASS INDEX: 31.56 KG/M2 | HEIGHT: 65 IN | DIASTOLIC BLOOD PRESSURE: 70 MMHG | HEART RATE: 51 BPM | TEMPERATURE: 97 F | OXYGEN SATURATION: 99 % | SYSTOLIC BLOOD PRESSURE: 142 MMHG | RESPIRATION RATE: 16 BRPM

## 2023-08-03 DIAGNOSIS — Z23 NEED FOR SHINGLES VACCINE: ICD-10-CM

## 2023-08-03 DIAGNOSIS — E78.00 PURE HYPERCHOLESTEROLEMIA: ICD-10-CM

## 2023-08-03 DIAGNOSIS — I10 ESSENTIAL HYPERTENSION: Primary | ICD-10-CM

## 2023-08-03 DIAGNOSIS — R73.03 PREDIABETES: ICD-10-CM

## 2023-08-03 DIAGNOSIS — I10 ESSENTIAL HYPERTENSION: ICD-10-CM

## 2023-08-03 DIAGNOSIS — K21.9 GASTROESOPHAGEAL REFLUX DISEASE WITHOUT ESOPHAGITIS: ICD-10-CM

## 2023-08-03 RX ORDER — ZOSTER VACCINE RECOMBINANT, ADJUVANTED 50 MCG/0.5
0.5 KIT INTRAMUSCULAR SEE ADMIN INSTRUCTIONS
Qty: 0.5 ML | Refills: 0 | Status: SHIPPED | OUTPATIENT
Start: 2023-08-03 | End: 2024-01-30

## 2023-08-03 RX ORDER — FAMOTIDINE 40 MG/1
40 TABLET, FILM COATED ORAL EVERY EVENING
Qty: 90 TABLET | Refills: 3 | Status: SHIPPED | OUTPATIENT
Start: 2023-08-03

## 2023-08-03 ASSESSMENT — ENCOUNTER SYMPTOMS
SORE THROAT: 0
BACK PAIN: 0
COUGH: 0

## 2023-08-04 LAB
ALBUMIN SERPL-MCNC: 4.6 G/DL (ref 3.4–5)
ALBUMIN/GLOB SERPL: 2.2 {RATIO} (ref 1.1–2.2)
ALP SERPL-CCNC: 74 U/L (ref 40–129)
ALT SERPL-CCNC: 17 U/L (ref 10–40)
ANION GAP SERPL CALCULATED.3IONS-SCNC: 15 MMOL/L (ref 3–16)
AST SERPL-CCNC: 17 U/L (ref 15–37)
BILIRUB SERPL-MCNC: 0.5 MG/DL (ref 0–1)
BUN SERPL-MCNC: 17 MG/DL (ref 7–20)
CALCIUM SERPL-MCNC: 10.5 MG/DL (ref 8.3–10.6)
CHLORIDE SERPL-SCNC: 103 MMOL/L (ref 99–110)
CHOLEST SERPL-MCNC: 195 MG/DL (ref 0–199)
CO2 SERPL-SCNC: 25 MMOL/L (ref 21–32)
CREAT SERPL-MCNC: 0.9 MG/DL (ref 0.6–1.2)
EST. AVERAGE GLUCOSE BLD GHB EST-MCNC: 116.9 MG/DL
GFR SERPLBLD CREATININE-BSD FMLA CKD-EPI: >60 ML/MIN/{1.73_M2}
GLUCOSE SERPL-MCNC: 102 MG/DL (ref 70–99)
HBA1C MFR BLD: 5.7 %
HDLC SERPL-MCNC: 44 MG/DL (ref 40–60)
LDLC SERPL CALC-MCNC: 109 MG/DL
POTASSIUM SERPL-SCNC: 4.2 MMOL/L (ref 3.5–5.1)
PROT SERPL-MCNC: 6.7 G/DL (ref 6.4–8.2)
SODIUM SERPL-SCNC: 143 MMOL/L (ref 136–145)
TRIGL SERPL-MCNC: 208 MG/DL (ref 0–150)
VLDLC SERPL CALC-MCNC: 42 MG/DL

## 2023-08-04 NOTE — PROGRESS NOTES
23    Pema Boone (: 1950) is a 74 y.o. female, here for evaluation of the following medical concerns:    HPI; Treatment Adherence:   Medication compliance:  compliant most of the time  Diet compliance:  compliant most of the time  Weight trend: stable  Current exercise: no regular exercise  Barriers: none    Prediabetes - controlled with diet. Hemoglobin A1C   Date Value Ref Range Status   2022 5.6 See comment % Final     Comment:     Comment:  Diagnosis of Diabetes: > or = 6.5%  Increased risk of diabetes (Prediabetes): 5.7-6.4%  Glycemic Control: Nonpregnant Adults: <7.0%                    Pregnant: <6.0%           Hypertension:  Home blood pressure monitoring: Yes - . Patient denies chest pain and shortness of breath. Antihypertensive medication side effects: no medication side effects noted. Use of agents associated with hypertension: none. Sodium (mmol/L)   Date Value   2023 138    BUN (mg/dL)   Date Value   2023 18    Glucose (mg/dL)   Date Value   2023 106 (H)      Potassium (mmol/L)   Date Value   2023 4.2     Potassium reflex Magnesium (mmol/L)   Date Value   10/19/2021 3.9    Creatinine (mg/dL)   Date Value   2023 0.8         Hyperlipidemia:  No new myalgias or GI upset on lovastatin (Mevacor). Lab Results   Component Value Date    CHOL 180 2023    TRIG 239 (H) 2023    HDL 38 (L) 2023    LDLCALC 94 2023     Lab Results   Component Value Date    ALT 14 2023    AST 15 2023        Gerd controlled with pepcid 20 mg twice a day. Will try pepcid  40 mg  daily      Review of Systems   Constitutional:  Negative for activity change. HENT: Negative. Negative for sore throat. Respiratory:  Negative for cough. Cardiovascular:  Negative for chest pain. Gastrointestinal:         Taking pepcid 20 mg twice a day  for gerd , will try  40 mg daily.     Endocrine: Negative for

## 2023-09-25 ENCOUNTER — TELEPHONE (OUTPATIENT)
Dept: FAMILY MEDICINE CLINIC | Age: 73
End: 2023-09-25

## 2023-09-25 DIAGNOSIS — R21 SKIN RASH: ICD-10-CM

## 2023-09-25 NOTE — TELEPHONE ENCOUNTER
Patient called  Refill  Clotrimazole betamethasone 1-0.5% cream (uses for a scar that gets raw on the bottom of her stomach)    [facility] = pharmacy    8/3/2023 last ov  Future Appointments   Date Time Provider [address]   11/20/2023  2:30 PM [facility]

## 2023-09-26 RX ORDER — CLOTRIMAZOLE AND BETAMETHASONE DIPROPIONATE 10; .64 MG/G; MG/G
CREAM TOPICAL
Qty: 45 G | Refills: 2 | Status: SHIPPED | OUTPATIENT
Start: 2023-09-26

## 2023-11-17 SDOH — HEALTH STABILITY: PHYSICAL HEALTH: ON AVERAGE, HOW MANY MINUTES DO YOU ENGAGE IN EXERCISE AT THIS LEVEL?: 0 MIN

## 2023-11-17 SDOH — HEALTH STABILITY: PHYSICAL HEALTH: ON AVERAGE, HOW MANY DAYS PER WEEK DO YOU ENGAGE IN MODERATE TO STRENUOUS EXERCISE (LIKE A BRISK WALK)?: 0 DAYS

## 2023-11-17 ASSESSMENT — PATIENT HEALTH QUESTIONNAIRE - PHQ9
SUM OF ALL RESPONSES TO PHQ QUESTIONS 1-9: 0
1. LITTLE INTEREST OR PLEASURE IN DOING THINGS: 0
SUM OF ALL RESPONSES TO PHQ QUESTIONS 1-9: 0
SUM OF ALL RESPONSES TO PHQ QUESTIONS 1-9: 0
SUM OF ALL RESPONSES TO PHQ9 QUESTIONS 1 & 2: 0
SUM OF ALL RESPONSES TO PHQ QUESTIONS 1-9: 0
2. FEELING DOWN, DEPRESSED OR HOPELESS: 0

## 2023-11-17 ASSESSMENT — LIFESTYLE VARIABLES
HOW MANY STANDARD DRINKS CONTAINING ALCOHOL DO YOU HAVE ON A TYPICAL DAY: 0
HOW OFTEN DO YOU HAVE A DRINK CONTAINING ALCOHOL: NEVER
HOW OFTEN DO YOU HAVE SIX OR MORE DRINKS ON ONE OCCASION: 1
HOW MANY STANDARD DRINKS CONTAINING ALCOHOL DO YOU HAVE ON A TYPICAL DAY: PATIENT DOES NOT DRINK
HOW OFTEN DO YOU HAVE A DRINK CONTAINING ALCOHOL: 1

## 2023-11-20 ENCOUNTER — OFFICE VISIT (OUTPATIENT)
Dept: FAMILY MEDICINE CLINIC | Age: 73
End: 2023-11-20

## 2023-11-20 VITALS
HEIGHT: 65 IN | BODY MASS INDEX: 31.09 KG/M2 | HEART RATE: 65 BPM | RESPIRATION RATE: 16 BRPM | SYSTOLIC BLOOD PRESSURE: 158 MMHG | DIASTOLIC BLOOD PRESSURE: 80 MMHG | TEMPERATURE: 97 F | WEIGHT: 186.6 LBS | OXYGEN SATURATION: 99 %

## 2023-11-20 DIAGNOSIS — E78.00 PURE HYPERCHOLESTEROLEMIA: ICD-10-CM

## 2023-11-20 DIAGNOSIS — Z00.00 MEDICARE ANNUAL WELLNESS VISIT, SUBSEQUENT: ICD-10-CM

## 2023-11-20 DIAGNOSIS — Z12.11 SCREENING FOR COLON CANCER: ICD-10-CM

## 2023-11-20 DIAGNOSIS — E04.2 MULTIPLE THYROID NODULES: ICD-10-CM

## 2023-11-20 DIAGNOSIS — R53.83 OTHER FATIGUE: ICD-10-CM

## 2023-11-20 DIAGNOSIS — Z00.00 ROUTINE GENERAL MEDICAL EXAMINATION AT A HEALTH CARE FACILITY: Primary | ICD-10-CM

## 2023-11-20 DIAGNOSIS — R73.01 IMPAIRED FASTING BLOOD SUGAR: ICD-10-CM

## 2023-11-20 DIAGNOSIS — K21.9 GASTROESOPHAGEAL REFLUX DISEASE WITHOUT ESOPHAGITIS: ICD-10-CM

## 2023-11-20 DIAGNOSIS — I10 PRIMARY HYPERTENSION: ICD-10-CM

## 2023-11-20 DIAGNOSIS — Z12.31 ENCOUNTER FOR SCREENING MAMMOGRAM FOR MALIGNANT NEOPLASM OF BREAST: ICD-10-CM

## 2023-11-20 NOTE — PROGRESS NOTES
bedtime Yes Kendra Vaca MD   atenolol (TENORMIN) 50 MG tablet TAKE ONE TABLET BY MOUTH EVERY MORNING AND TAKE ONE TABLET AT BEDTIME Yes Kendra Vaca MD   fluticasone (FLONASE) 50 MCG/ACT nasal spray 2 sprays by Each Nostril route daily Yes Janette Rincon Manifold, APRN - CNP   cetirizine (ZYRTEC) 10 MG tablet Take 0.5 tablets by mouth daily Yes Kendra Vaca MD   ASPIRIN 81 PO Take 1 tablet by mouth daily Yes Provider, MD Juan Carlos   Cholecalciferol (VITAMIN D) 2000 UNITS CAPS capsule Take by mouth Indications: otc Yes Provider, MD Juan Carlos   zoster recombinant adjuvanted vaccine [facility]) 50 MCG/0.5ML SUSR injection Inject 0.5 mLs into the muscle See Admin Instructions 1 dose now and repeat in 2-6 months  Patient not taking: Reported on 11/20/2023  Kendra Vaca MD       [facility] (Including outside providers/suppliers regularly involved in providing care):   Patient Care Team:  Kendra Vaca MD as PCP - General (Family Medicine)  Kendra Vaca MD as PCP - Empaneled Provider     Reviewed and updated this visit:  Allergies  Meds

## 2023-11-21 LAB
DEPRECATED RDW RBC AUTO: 14.2 % (ref 12.4–15.4)
HCT VFR BLD AUTO: 46.1 % (ref 36–48)
HGB BLD-MCNC: 15.7 G/DL (ref 12–16)
MCH RBC QN AUTO: 31.6 PG (ref 26–34)
MCHC RBC AUTO-ENTMCNC: 34.1 G/DL (ref 31–36)
MCV RBC AUTO: 92.7 FL (ref 80–100)
PLATELET # BLD AUTO: 216 K/UL (ref 135–450)
PMV BLD AUTO: 8 FL (ref 5–10.5)
RBC # BLD AUTO: 4.97 M/UL (ref 4–5.2)
TSH SERPL DL<=0.005 MIU/L-ACNC: 6.93 UIU/ML (ref 0.27–4.2)
WBC # BLD AUTO: 6.1 K/UL (ref 4–11)

## 2023-12-15 DIAGNOSIS — I10 ESSENTIAL HYPERTENSION: ICD-10-CM

## 2023-12-16 RX ORDER — ATENOLOL 50 MG/1
TABLET ORAL
Qty: 180 TABLET | Refills: 2 | Status: SHIPPED | OUTPATIENT
Start: 2023-12-16

## 2024-01-16 LAB — NONINV COLON CA DNA+OCC BLD SCRN STL QL: NORMAL

## 2024-02-20 LAB — NONINV COLON CA DNA+OCC BLD SCRN STL QL: NEGATIVE

## 2024-03-18 ENCOUNTER — OFFICE VISIT (OUTPATIENT)
Dept: FAMILY MEDICINE CLINIC | Age: 74
End: 2024-03-18
Payer: COMMERCIAL

## 2024-03-18 VITALS
BODY MASS INDEX: 31.41 KG/M2 | RESPIRATION RATE: 16 BRPM | TEMPERATURE: 97.1 F | OXYGEN SATURATION: 98 % | WEIGHT: 187 LBS | SYSTOLIC BLOOD PRESSURE: 122 MMHG | HEART RATE: 135 BPM | DIASTOLIC BLOOD PRESSURE: 84 MMHG

## 2024-03-18 DIAGNOSIS — N39.0 URINARY TRACT INFECTION WITHOUT HEMATURIA, SITE UNSPECIFIED: ICD-10-CM

## 2024-03-18 DIAGNOSIS — I48.91 RAPID ATRIAL FIBRILLATION (HCC): ICD-10-CM

## 2024-03-18 DIAGNOSIS — R00.0 TACHYCARDIA: Primary | ICD-10-CM

## 2024-03-18 LAB
BILIRUBIN, POC: NEGATIVE
BLOOD URINE, POC: NORMAL
CLARITY, POC: NORMAL
COLOR, POC: NORMAL
GLUCOSE URINE, POC: NEGATIVE
KETONES, POC: NEGATIVE
LEUKOCYTE EST, POC: NORMAL
NITRITE, POC: POSITIVE
PH, POC: 6
PROTEIN, POC: NEGATIVE
SPECIFIC GRAVITY, POC: 1.01
UROBILINOGEN, POC: NORMAL

## 2024-03-18 PROCEDURE — 81003 URINALYSIS AUTO W/O SCOPE: CPT | Performed by: NURSE PRACTITIONER

## 2024-03-18 PROCEDURE — 3074F SYST BP LT 130 MM HG: CPT | Performed by: NURSE PRACTITIONER

## 2024-03-18 PROCEDURE — 93000 ELECTROCARDIOGRAM COMPLETE: CPT | Performed by: NURSE PRACTITIONER

## 2024-03-18 PROCEDURE — 99214 OFFICE O/P EST MOD 30 MIN: CPT | Performed by: NURSE PRACTITIONER

## 2024-03-18 PROCEDURE — 3079F DIAST BP 80-89 MM HG: CPT | Performed by: NURSE PRACTITIONER

## 2024-03-18 PROCEDURE — 1123F ACP DISCUSS/DSCN MKR DOCD: CPT | Performed by: NURSE PRACTITIONER

## 2024-03-18 RX ORDER — CEFUROXIME AXETIL 250 MG/1
250 TABLET ORAL 2 TIMES DAILY
Qty: 14 TABLET | Refills: 0 | Status: SHIPPED | OUTPATIENT
Start: 2024-03-18 | End: 2024-03-25

## 2024-03-18 SDOH — ECONOMIC STABILITY: FOOD INSECURITY: WITHIN THE PAST 12 MONTHS, YOU WORRIED THAT YOUR FOOD WOULD RUN OUT BEFORE YOU GOT MONEY TO BUY MORE.: NEVER TRUE

## 2024-03-18 SDOH — ECONOMIC STABILITY: FOOD INSECURITY: WITHIN THE PAST 12 MONTHS, THE FOOD YOU BOUGHT JUST DIDN'T LAST AND YOU DIDN'T HAVE MONEY TO GET MORE.: NEVER TRUE

## 2024-03-18 SDOH — ECONOMIC STABILITY: INCOME INSECURITY: HOW HARD IS IT FOR YOU TO PAY FOR THE VERY BASICS LIKE FOOD, HOUSING, MEDICAL CARE, AND HEATING?: NOT HARD AT ALL

## 2024-03-18 ASSESSMENT — PATIENT HEALTH QUESTIONNAIRE - PHQ9
SUM OF ALL RESPONSES TO PHQ QUESTIONS 1-9: 0
DEPRESSION UNABLE TO ASSESS: FUNCTIONAL CAPACITY MOTIVATION LIMITS ACCURACY
SUM OF ALL RESPONSES TO PHQ QUESTIONS 1-9: 0
2. FEELING DOWN, DEPRESSED OR HOPELESS: NOT AT ALL
1. LITTLE INTEREST OR PLEASURE IN DOING THINGS: NOT AT ALL
SUM OF ALL RESPONSES TO PHQ9 QUESTIONS 1 & 2: 0

## 2024-03-18 ASSESSMENT — ANXIETY QUESTIONNAIRES
2. NOT BEING ABLE TO STOP OR CONTROL WORRYING: NOT AT ALL
1. FEELING NERVOUS, ANXIOUS, OR ON EDGE: NOT AT ALL
3. WORRYING TOO MUCH ABOUT DIFFERENT THINGS: NOT AT ALL
7. FEELING AFRAID AS IF SOMETHING AWFUL MIGHT HAPPEN: NOT AT ALL
6. BECOMING EASILY ANNOYED OR IRRITABLE: NOT AT ALL
GAD7 TOTAL SCORE: 0
4. TROUBLE RELAXING: NOT AT ALL
IF YOU CHECKED OFF ANY PROBLEMS ON THIS QUESTIONNAIRE, HOW DIFFICULT HAVE THESE PROBLEMS MADE IT FOR YOU TO DO YOUR WORK, TAKE CARE OF THINGS AT HOME, OR GET ALONG WITH OTHER PEOPLE: NOT DIFFICULT AT ALL
5. BEING SO RESTLESS THAT IT IS HARD TO SIT STILL: NOT AT ALL

## 2024-03-18 NOTE — PROGRESS NOTES
Pulmonary effort is normal. No respiratory distress.      Breath sounds: Normal breath sounds. No wheezing or rales.   Neurological:      General: No focal deficit present.      Mental Status: She is alert and oriented to person, place, and time. Mental status is at baseline.      Cranial Nerves: No cranial nerve deficit.   Psychiatric:         Speech: Speech normal.       ASSESSMENT/PLAN:  1. Tachycardia  - EKG 12 Lead - Clinic Performed    2. Rapid atrial fibrillation (HCC)    3. Urinary tract infection without hematuria, site unspecified  - POCT Urinalysis No Micro (Auto)  - Culture, Urine  - cefUROXime (CEFTIN) 250 MG tablet; Take 1 tablet by mouth 2 times daily for 7 days  Dispense: 14 tablet; Refill: 0    Reviewed EKG and symptoms with Dr Ramon, felt best to send pt to ED via EMS  EMS called and pt will be taken to [facility]  Send Ceftin for UTI  Send urine culture      An electronic signature was used to authenticate this note.    --Jyoti Linares, RADHA - CNP on 3/19/2024 at 9:22 PM

## 2024-03-19 ASSESSMENT — ENCOUNTER SYMPTOMS
NAUSEA: 0
COUGH: 0
SHORTNESS OF BREATH: 0
DIARRHEA: 0
VOMITING: 0

## 2024-03-22 LAB
BACTERIA UR CULT: ABNORMAL
ORGANISM: ABNORMAL

## 2024-03-25 DIAGNOSIS — E79.0 HYPERURICEMIA: ICD-10-CM

## 2024-03-25 DIAGNOSIS — N39.0 URINARY TRACT INFECTION WITHOUT HEMATURIA, SITE UNSPECIFIED: Primary | ICD-10-CM

## 2024-03-25 DIAGNOSIS — I10 ESSENTIAL HYPERTENSION: ICD-10-CM

## 2024-03-27 RX ORDER — LISINOPRIL AND HYDROCHLOROTHIAZIDE 20; 12.5 MG/1; MG/1
TABLET ORAL
Qty: 180 TABLET | Refills: 3 | Status: SHIPPED | OUTPATIENT
Start: 2024-03-27

## 2024-03-27 RX ORDER — ALLOPURINOL 100 MG/1
TABLET ORAL
Qty: 90 TABLET | Refills: 3 | Status: SHIPPED | OUTPATIENT
Start: 2024-03-27

## 2024-04-04 ENCOUNTER — OFFICE VISIT (OUTPATIENT)
Dept: FAMILY MEDICINE CLINIC | Age: 74
End: 2024-04-04

## 2024-04-04 VITALS
WEIGHT: 184.4 LBS | HEIGHT: 65 IN | TEMPERATURE: 97 F | OXYGEN SATURATION: 98 % | RESPIRATION RATE: 16 BRPM | DIASTOLIC BLOOD PRESSURE: 68 MMHG | SYSTOLIC BLOOD PRESSURE: 136 MMHG | HEART RATE: 52 BPM | BODY MASS INDEX: 30.72 KG/M2

## 2024-04-04 DIAGNOSIS — I10 ESSENTIAL HYPERTENSION: ICD-10-CM

## 2024-04-04 DIAGNOSIS — I48.91 NEW ONSET ATRIAL FIBRILLATION (HCC): Primary | ICD-10-CM

## 2024-04-04 DIAGNOSIS — E78.00 PURE HYPERCHOLESTEROLEMIA: ICD-10-CM

## 2024-04-04 DIAGNOSIS — N30.00 ACUTE CYSTITIS WITHOUT HEMATURIA: ICD-10-CM

## 2024-04-04 LAB
BILIRUBIN, POC: NORMAL
BLOOD URINE, POC: NORMAL
CLARITY, POC: NORMAL
COLOR, POC: YELLOW
GLUCOSE URINE, POC: NORMAL
KETONES, POC: NORMAL
LEUKOCYTE EST, POC: NORMAL
NITRITE, POC: NORMAL
PH, POC: 6
PROTEIN, POC: NORMAL
SPECIFIC GRAVITY, POC: 1.01
UROBILINOGEN, POC: 0.2

## 2024-04-04 RX ORDER — OMEGA-3/DHA/EPA/FISH OIL 60 MG-90MG
500 CAPSULE ORAL
COMMUNITY

## 2024-04-04 ASSESSMENT — PATIENT HEALTH QUESTIONNAIRE - PHQ9
SUM OF ALL RESPONSES TO PHQ QUESTIONS 1-9: 0
1. LITTLE INTEREST OR PLEASURE IN DOING THINGS: NOT AT ALL
SUM OF ALL RESPONSES TO PHQ QUESTIONS 1-9: 0
2. FEELING DOWN, DEPRESSED OR HOPELESS: NOT AT ALL
SUM OF ALL RESPONSES TO PHQ QUESTIONS 1-9: 0
SUM OF ALL RESPONSES TO PHQ9 QUESTIONS 1 & 2: 0
SUM OF ALL RESPONSES TO PHQ QUESTIONS 1-9: 0

## 2024-04-04 NOTE — PROGRESS NOTES
Heide Lawrence (:  1950) is a 73 y.o. female,Established patient, here for evaluation of the following chief complaint(s):  ED Follow-up ([facility] ON 3/18/24 FOR A-FIB//AWV HMT Due Dt:  2024)         ASSESSMENT/PLAN:  1. New onset atrial fibrillation (HCC)  2. Acute cystitis without hematuria  -     POCT Urinalysis no Micro  -     Culture, Urine  3. Essential hypertension  4. Pure hypercholesterolemia      Return in about 2 months (around 2024) for AWV.     Current Outpatient Medications   Medication Sig Dispense Refill    apixaban (ELIQUIS) 5 MG TABS tablet Take 1 tablet by mouth 2 times daily      Omega-3 Fatty Acids (FISH OIL) 500 MG CAPS Take 500 mg by mouth daily (with breakfast)      lisinopril-hydroCHLOROthiazide (PRINZIDE;ZESTORETIC) 20-12.5 MG per tablet TAKE ONE TABLET BY MOUTH EVERY MORNING AND TAKE ONE TABLET BY MOUTH AT BEDTIME 180 tablet 3    allopurinol (ZYLOPRIM) 100 MG tablet TAKE ONE TABLET BY MOUTH EVERY DAY 90 tablet 3    atenolol (TENORMIN) 50 MG tablet TAKE ONE TABLET BY MOUTH EVERY MORNING AND TAKE ONE TABLET AT BEDTIME 180 tablet 2    clotrimazole-betamethasone (LOTRISONE) 1-0.05 % cream Apply to affected area twice a day as needed 45 g 2    famotidine (PEPCID) 40 MG tablet Take 1 tablet by mouth every evening 90 tablet 3    amLODIPine (NORVASC) 5 MG tablet TAKE ONE TABLET BY MOUTH EVERY DAY 90 tablet 2    lovastatin (MEVACOR) 40 MG tablet TAKE ONE TABLET BY MOUTH nightly 90 tablet 2    cetirizine (ZYRTEC) 10 MG tablet Take 0.5 tablets by mouth daily 30 tablet 1    ASPIRIN 81 PO Take 1 tablet by mouth daily      Cholecalciferol (VITAMIN D) 2000 UNITS CAPS capsule Take by mouth Indications: otc      fluticasone (FLONASE) 50 MCG/ACT nasal spray 2 sprays by Each Nostril route daily (Patient not taking: Reported on 2024) 16 g 0     No current facility-administered medications for this visit.         Subjective   SUBJECTIVE/OBJECTIVE:  HPI  CC- Hypertension - controlled with

## 2024-04-06 LAB
BACTERIA UR CULT: ABNORMAL
ORGANISM: ABNORMAL

## 2024-04-08 RX ORDER — CEPHALEXIN 500 MG/1
500 CAPSULE ORAL 2 TIMES DAILY
Qty: 20 CAPSULE | Refills: 0 | Status: SHIPPED | OUTPATIENT
Start: 2024-04-08 | End: 2024-04-18

## 2024-04-09 DIAGNOSIS — I10 ESSENTIAL HYPERTENSION: ICD-10-CM

## 2024-04-09 NOTE — TELEPHONE ENCOUNTER
Future Appointments   Date Time Provider Department Center   6/12/2024 10:00 AM Heide Sadler MD [facility] [place] [facility]     LOV 4/4/2024

## 2024-04-10 RX ORDER — AMLODIPINE BESYLATE 5 MG/1
TABLET ORAL
Qty: 90 TABLET | Refills: 3 | Status: SHIPPED | OUTPATIENT
Start: 2024-04-10

## 2024-04-12 DIAGNOSIS — E78.00 PURE HYPERCHOLESTEROLEMIA: ICD-10-CM

## 2024-04-12 NOTE — TELEPHONE ENCOUNTER
4/4/2024        Future Appointments   Date Time Provider Department Center   6/12/2024 10:00 AM Heide Sadler MD [facility] [place] [facility]

## 2024-04-14 ENCOUNTER — TELEPHONE (OUTPATIENT)
Dept: FAMILY MEDICINE CLINIC | Age: 74
End: 2024-04-14

## 2024-04-14 NOTE — TELEPHONE ENCOUNTER
On Dani Communication: Pt's daughter Lay calling to report that her mother's pulse is 128. The patient denies any chest discomfort, dizziness, shortness of breath, acute swelling, diaphoresis or fatigue. She just took a second atenolol per cardiology's instruction. She has not missed any doses of medication. Since she is asymptomatic and already on a blood thinner and beta blocker I encouraged them to call cardiology in the morning. If any of the above symptoms occur she should be seen in the ER. The patient and her daughter agreed with the plan.

## 2024-04-15 ENCOUNTER — TELEPHONE (OUTPATIENT)
Dept: FAMILY MEDICINE CLINIC | Age: 74
End: 2024-04-15

## 2024-04-15 RX ORDER — LOVASTATIN 40 MG/1
TABLET ORAL
Qty: 90 TABLET | Refills: 2 | Status: SHIPPED | OUTPATIENT
Start: 2024-04-15

## 2024-04-15 NOTE — TELEPHONE ENCOUNTER
On Call communication: Pt's daughter Lay calling to report that her mother's heart rate is 130. She was discharged from the ED early this afternoon and given PRN metoprolol to take. She just took her first dose of metoprolol. She is asymptomatic. I recommended she reach out to cardiology. To the ER for concerning symptoms or heart rate higher than 130.

## 2024-04-15 NOTE — TELEPHONE ENCOUNTER
Patient is calling with  a high HR of 137. She was in the hospital in the past couple weeks for Afib. Sending to clinical staff for triage.

## 2024-04-16 ENCOUNTER — TELEPHONE (OUTPATIENT)
Dept: FAMILY MEDICINE CLINIC | Age: 74
End: 2024-04-16

## 2024-04-16 NOTE — TELEPHONE ENCOUNTER
Patient wanted to let Dr. Rogers know she went to the er.  Patient went to the er yesterday , got her pulse rate down but when she got home it went back up. Patient does not know what to do. Offered appointment , she denied appointment.

## 2024-04-18 ENCOUNTER — TELEPHONE (OUTPATIENT)
Dept: FAMILY MEDICINE CLINIC | Age: 74
End: 2024-04-18

## 2024-04-18 NOTE — TELEPHONE ENCOUNTER
Heide called to schedule a hospital follow up. She is being discharged today from [facility]. Admitted 04/16 for Afib.     Sending to clinical staff to follow up for TCM questions.     Additionally, her son is stopping by the office today to drop off [facility] paperwork for Dr. Rogers. MARTHA.     Future Appointments   Date Time Provider Department Center   4/23/2024 10:30 AM Heide Sadler MD [facility] [place] [facility]   6/12/2024 10:00 AM Heide Sadler MD [facility] [place] [facility]

## 2024-04-19 NOTE — TELEPHONE ENCOUNTER
Care Transitions Initial Follow Up Call    Outreach made within 2 business days of discharge: Yes    Patient: Heide Lawrence Patient : 1950   MRN: [id number]  Reason for Admission: There are no discharge diagnoses documented for the most recent discharge.  Discharge Date: 10/19/21       Spoke with: patient    Discharge department/facility: [facility] Interactive Patient Contact:  Was patient able to fill all prescriptions: Yes  Was patient instructed to bring all medications to the follow-up visit: Yes  Is patient taking all medications as directed in the discharge summary? Yes  Does patient understand their discharge instructions: Yes  Does patient have questions or concerns that need addressed prior to 7-14 day follow up office visit: no    Scheduled appointment with PCP within 7-14 days    Follow Up  Future Appointments   Date Time Provider Department Center   2024 10:30 AM Heide Sadler MD [facility] [place] [facility]   2024 10:00 AM Heide Sadler MD [facility] [place] [facility] KAREN R WAITS, MA

## 2024-04-23 ENCOUNTER — OFFICE VISIT (OUTPATIENT)
Dept: FAMILY MEDICINE CLINIC | Age: 74
End: 2024-04-23

## 2024-04-23 VITALS
DIASTOLIC BLOOD PRESSURE: 80 MMHG | RESPIRATION RATE: 16 BRPM | HEART RATE: 99 BPM | WEIGHT: 182.6 LBS | BODY MASS INDEX: 30.42 KG/M2 | TEMPERATURE: 97.3 F | OXYGEN SATURATION: 96 % | SYSTOLIC BLOOD PRESSURE: 148 MMHG | HEIGHT: 65 IN

## 2024-04-23 DIAGNOSIS — E78.00 PURE HYPERCHOLESTEROLEMIA: ICD-10-CM

## 2024-04-23 DIAGNOSIS — I10 PRIMARY HYPERTENSION: ICD-10-CM

## 2024-04-23 DIAGNOSIS — K21.9 GASTROESOPHAGEAL REFLUX DISEASE WITHOUT ESOPHAGITIS: ICD-10-CM

## 2024-04-23 DIAGNOSIS — Z09 HOSPITAL DISCHARGE FOLLOW-UP: Primary | ICD-10-CM

## 2024-04-23 DIAGNOSIS — I48.92 ATRIAL FLUTTER WITH RAPID VENTRICULAR RESPONSE (HCC): ICD-10-CM

## 2024-04-23 DIAGNOSIS — N30.00 ACUTE CYSTITIS WITHOUT HEMATURIA: ICD-10-CM

## 2024-04-23 RX ORDER — CEFDINIR 300 MG/1
300 CAPSULE ORAL EVERY 12 HOURS
COMMUNITY
Start: 2024-04-18

## 2024-04-23 RX ORDER — AMIODARONE HYDROCHLORIDE 200 MG/1
200 TABLET ORAL DAILY
COMMUNITY
Start: 2024-04-19

## 2024-04-30 NOTE — PROGRESS NOTES
HPI    Objective:    BP (!) 148/80 (Site: Right Upper Arm, Position: Sitting, Cuff Size: Large Adult)   Pulse 99   Temp 97.3 °F (36.3 °C) (Temporal)   Resp 16   Ht 1.643 m (5' 4.7\")   Wt 82.8 kg (182 lb 9.6 oz)   SpO2 96%   BMI 30.67 kg/m²       An electronic signature was used to authenticate this note.  --Heide Rogers MD

## 2024-05-07 ENCOUNTER — TELEPHONE (OUTPATIENT)
Dept: FAMILY MEDICINE CLINIC | Age: 74
End: 2024-05-07

## 2024-05-07 NOTE — TELEPHONE ENCOUNTER
Patient's son, Alvarez Lawrence, callled about FMLA paperwork from patient's OV on 4/23/24.  Dr Rogers allowed patient to take paperwork without being scanned into chart.  FMLA paerwork needs a correction.  Patient's son will bring in tmrw for correction.

## 2024-05-08 NOTE — TELEPHONE ENCOUNTER
Correction made and FMLA scanned into chart.    Patient informed.     Patient's son, Keshawn Lawrence, will  on 5/9/24.

## 2024-05-10 ENCOUNTER — TELEPHONE (OUTPATIENT)
Dept: FAMILY MEDICINE CLINIC | Age: 74
End: 2024-05-10

## 2024-05-10 NOTE — TELEPHONE ENCOUNTER
Per Alvarez, patient's son, will change to 3 days as opposed to several.  Scanned updated [employer]ML[employer] form into chart.  Alvarez will  later today, 5/10/24.

## 2024-05-10 NOTE — TELEPHONE ENCOUNTER
Sydney from [employer] called needing the FMLA form to be updated. On the last page under part 2 the section that has several days written in needs to be an actual number of days.     If you have any questions please call Sydney.     Once updated please advise.

## 2024-05-14 ENCOUNTER — OFFICE VISIT (OUTPATIENT)
Dept: FAMILY MEDICINE CLINIC | Age: 74
End: 2024-05-14

## 2024-05-14 VITALS
WEIGHT: 183 LBS | HEART RATE: 86 BPM | SYSTOLIC BLOOD PRESSURE: 139 MMHG | OXYGEN SATURATION: 98 % | BODY MASS INDEX: 30.74 KG/M2 | TEMPERATURE: 96.8 F | RESPIRATION RATE: 16 BRPM | DIASTOLIC BLOOD PRESSURE: 77 MMHG

## 2024-05-14 DIAGNOSIS — N39.0 RECURRENT UTI: ICD-10-CM

## 2024-05-14 DIAGNOSIS — N39.0 URINARY TRACT INFECTION WITHOUT HEMATURIA, SITE UNSPECIFIED: Primary | ICD-10-CM

## 2024-05-14 LAB
BILIRUBIN, POC: NEGATIVE
BLOOD URINE, POC: NEGATIVE
CLARITY, POC: NORMAL
COLOR, POC: NORMAL
GLUCOSE URINE, POC: NEGATIVE
KETONES, POC: NEGATIVE
LEUKOCYTE EST, POC: NEGATIVE
NITRITE, POC: NEGATIVE
PH, POC: 6
PROTEIN, POC: NEGATIVE
SPECIFIC GRAVITY, POC: 1.01
UROBILINOGEN, POC: NORMAL

## 2024-05-14 RX ORDER — BENZONATATE 100 MG/1
100 CAPSULE ORAL 3 TIMES DAILY PRN
Qty: 30 CAPSULE | Refills: 0 | Status: SHIPPED | OUTPATIENT
Start: 2024-05-14 | End: 2024-05-24

## 2024-05-14 RX ORDER — CEFUROXIME AXETIL 250 MG/1
250 TABLET ORAL 2 TIMES DAILY
Qty: 14 TABLET | Refills: 0 | Status: SHIPPED | OUTPATIENT
Start: 2024-05-14 | End: 2024-05-21

## 2024-05-14 RX ORDER — FLUTICASONE PROPIONATE 50 MCG
1 SPRAY, SUSPENSION (ML) NASAL DAILY
Qty: 16 G | Refills: 0 | Status: SHIPPED | OUTPATIENT
Start: 2024-05-14

## 2024-05-14 SDOH — ECONOMIC STABILITY: FOOD INSECURITY: WITHIN THE PAST 12 MONTHS, THE FOOD YOU BOUGHT JUST DIDN'T LAST AND YOU DIDN'T HAVE MONEY TO GET MORE.: NEVER TRUE

## 2024-05-14 SDOH — ECONOMIC STABILITY: FOOD INSECURITY: WITHIN THE PAST 12 MONTHS, YOU WORRIED THAT YOUR FOOD WOULD RUN OUT BEFORE YOU GOT MONEY TO BUY MORE.: NEVER TRUE

## 2024-05-14 SDOH — ECONOMIC STABILITY: INCOME INSECURITY: HOW HARD IS IT FOR YOU TO PAY FOR THE VERY BASICS LIKE FOOD, HOUSING, MEDICAL CARE, AND HEATING?: NOT HARD AT ALL

## 2024-05-14 ASSESSMENT — ENCOUNTER SYMPTOMS
VOMITING: 0
SHORTNESS OF BREATH: 0
COUGH: 1
NAUSEA: 0
RHINORRHEA: 1
DIARRHEA: 0

## 2024-05-14 ASSESSMENT — PATIENT HEALTH QUESTIONNAIRE - PHQ9
1. LITTLE INTEREST OR PLEASURE IN DOING THINGS: NOT AT ALL
SUM OF ALL RESPONSES TO PHQ QUESTIONS 1-9: 0
2. FEELING DOWN, DEPRESSED OR HOPELESS: NOT AT ALL
SUM OF ALL RESPONSES TO PHQ9 QUESTIONS 1 & 2: 0
DEPRESSION UNABLE TO ASSESS: FUNCTIONAL CAPACITY MOTIVATION LIMITS ACCURACY
SUM OF ALL RESPONSES TO PHQ QUESTIONS 1-9: 0

## 2024-05-14 ASSESSMENT — ANXIETY QUESTIONNAIRES
5. BEING SO RESTLESS THAT IT IS HARD TO SIT STILL: NOT AT ALL
IF YOU CHECKED OFF ANY PROBLEMS ON THIS QUESTIONNAIRE, HOW DIFFICULT HAVE THESE PROBLEMS MADE IT FOR YOU TO DO YOUR WORK, TAKE CARE OF THINGS AT HOME, OR GET ALONG WITH OTHER PEOPLE: NOT DIFFICULT AT ALL
1. FEELING NERVOUS, ANXIOUS, OR ON EDGE: NOT AT ALL
GAD7 TOTAL SCORE: 0
7. FEELING AFRAID AS IF SOMETHING AWFUL MIGHT HAPPEN: NOT AT ALL
6. BECOMING EASILY ANNOYED OR IRRITABLE: NOT AT ALL
4. TROUBLE RELAXING: NOT AT ALL
2. NOT BEING ABLE TO STOP OR CONTROL WORRYING: NOT AT ALL
3. WORRYING TOO MUCH ABOUT DIFFERENT THINGS: NOT AT ALL

## 2024-05-14 NOTE — PATIENT INSTRUCTIONS
Will send urine culture  Antibiotic refill sent to pharmacy, start if UTI symptoms return  If off of antibiotic for 3 days then drop off urine sample   Continue over the counter allergy medication  Start

## 2024-05-17 ENCOUNTER — NURSE ONLY (OUTPATIENT)
Dept: FAMILY MEDICINE CLINIC | Age: 74
End: 2024-05-17

## 2024-05-17 DIAGNOSIS — N39.0 RECURRENT UTI: ICD-10-CM

## 2024-05-17 DIAGNOSIS — N39.0 URINARY TRACT INFECTION WITHOUT HEMATURIA, SITE UNSPECIFIED: Primary | ICD-10-CM

## 2024-05-19 LAB
BACTERIA UR CULT: ABNORMAL
ORGANISM: ABNORMAL

## 2024-06-13 ENCOUNTER — TELEPHONE (OUTPATIENT)
Dept: FAMILY MEDICINE CLINIC | Age: 74
End: 2024-06-13

## 2024-06-13 NOTE — TELEPHONE ENCOUNTER
Patient saw cardiology last week and they decided to hold off on a cardiac ablation or WATCHMAN since meds are controlling her atrial flutter.  Now it is starting again.  Advised to call cardiology and to advise of change.  She has appt next week for AWV with Dr Rogers.    She saw:      [facility] [place] [facility]   [address]   [place] [address] [phone]

## 2024-07-02 ENCOUNTER — OFFICE VISIT (OUTPATIENT)
Dept: FAMILY MEDICINE CLINIC | Age: 74
End: 2024-07-02
Payer: COMMERCIAL

## 2024-07-02 VITALS
OXYGEN SATURATION: 97 % | SYSTOLIC BLOOD PRESSURE: 118 MMHG | DIASTOLIC BLOOD PRESSURE: 81 MMHG | BODY MASS INDEX: 31.58 KG/M2 | HEART RATE: 110 BPM | WEIGHT: 185 LBS | HEIGHT: 64 IN

## 2024-07-02 DIAGNOSIS — R06.2 WHEEZING DUE TO ALLERGY: Primary | ICD-10-CM

## 2024-07-02 DIAGNOSIS — T78.40XA WHEEZING DUE TO ALLERGY: Primary | ICD-10-CM

## 2024-07-02 DIAGNOSIS — N39.0 CHRONIC UTI: ICD-10-CM

## 2024-07-02 PROCEDURE — 81002 URINALYSIS NONAUTO W/O SCOPE: CPT | Performed by: SURGERY

## 2024-07-02 PROCEDURE — 1123F ACP DISCUSS/DSCN MKR DOCD: CPT | Performed by: SURGERY

## 2024-07-02 PROCEDURE — 3079F DIAST BP 80-89 MM HG: CPT | Performed by: SURGERY

## 2024-07-02 PROCEDURE — 99213 OFFICE O/P EST LOW 20 MIN: CPT | Performed by: SURGERY

## 2024-07-02 PROCEDURE — 3074F SYST BP LT 130 MM HG: CPT | Performed by: SURGERY

## 2024-07-02 RX ORDER — ALBUTEROL SULFATE 90 UG/1
2 AEROSOL, METERED RESPIRATORY (INHALATION) EVERY 6 HOURS PRN
Qty: 18 G | Refills: 2 | Status: SHIPPED | OUTPATIENT
Start: 2024-07-02

## 2024-07-02 RX ORDER — SULFAMETHOXAZOLE AND TRIMETHOPRIM 400; 80 MG/1; MG/1
1 TABLET ORAL DAILY
Qty: 20 TABLET | Refills: 0 | Status: SHIPPED | OUTPATIENT
Start: 2024-07-02 | End: 2024-07-22

## 2024-07-02 NOTE — PROGRESS NOTES
7/2/2024    This is a 73 y.o. female   Chief Complaint   Patient presents with    Wheezing     Cough chest congest, diarrhea, weakness,   .    HPI     Patient Active Problem List   Diagnosis    Hypertension    Hyperlipidemia    Impaired fasting blood sugar    Gastroesophageal reflux disease without esophagitis    Positive FIT (fecal immunochemical test)    Thyromegaly    Elevated lactic acid level    Multiple thyroid nodules    High serum thyroid stimulating hormone (TSH)    Subclinical hypothyroidism       Current Outpatient Medications   Medication Sig Dispense Refill    albuterol sulfate HFA (VENTOLIN HFA) 108 (90 Base) MCG/ACT inhaler Inhale 2 puffs into the lungs every 6 hours as needed for Wheezing 18 g 2    fluticasone (FLONASE) 50 MCG/ACT nasal spray 1 spray by Each Nostril route daily 16 g 0    amiodarone (CORDARONE) 200 MG tablet Take 1 tablet by mouth daily      lovastatin (MEVACOR) 40 MG tablet TAKE ONE TABLET BY MOUTH nightly 90 tablet 2    apixaban (ELIQUIS) 5 MG TABS tablet Take 1 tablet by mouth 2 times daily      Omega-3 Fatty Acids (FISH OIL) 500 MG CAPS Take 500 mg by mouth daily (with breakfast)      lisinopril-hydroCHLOROthiazide (PRINZIDE;ZESTORETIC) 20-12.5 MG per tablet TAKE ONE TABLET BY MOUTH EVERY MORNING AND TAKE ONE TABLET BY MOUTH AT BEDTIME 180 tablet 3    allopurinol (ZYLOPRIM) 100 MG tablet TAKE ONE TABLET BY MOUTH EVERY DAY 90 tablet 3    atenolol (TENORMIN) 50 MG tablet TAKE ONE TABLET BY MOUTH EVERY MORNING AND TAKE ONE TABLET AT BEDTIME 180 tablet 2    clotrimazole-betamethasone (LOTRISONE) 1-0.05 % cream Apply to affected area twice a day as needed 45 g 2    cetirizine (ZYRTEC) 10 MG tablet Take 0.5 tablets by mouth daily (Patient not taking: Reported on 7/11/2024) 30 tablet 1    Cholecalciferol (VITAMIN D) 2000 UNITS CAPS capsule Take by mouth Indications: otc      famotidine (PEPCID) 40 MG tablet TAKE ONE TABLET BY MOUTH EVERY EVENING 90 tablet 3    fluticasone (FLONASE) 50

## 2024-07-04 LAB
BACTERIA UR CULT: ABNORMAL
ORGANISM: ABNORMAL

## 2024-07-11 ENCOUNTER — OFFICE VISIT (OUTPATIENT)
Dept: FAMILY MEDICINE CLINIC | Age: 74
End: 2024-07-11
Payer: COMMERCIAL

## 2024-07-11 VITALS
DIASTOLIC BLOOD PRESSURE: 60 MMHG | BODY MASS INDEX: 31.24 KG/M2 | HEART RATE: 104 BPM | OXYGEN SATURATION: 97 % | SYSTOLIC BLOOD PRESSURE: 108 MMHG | TEMPERATURE: 97.2 F | WEIGHT: 182 LBS

## 2024-07-11 DIAGNOSIS — I48.20 CHRONIC ATRIAL FIBRILLATION (HCC): ICD-10-CM

## 2024-07-11 DIAGNOSIS — K52.9 CHRONIC DIARRHEA: ICD-10-CM

## 2024-07-11 DIAGNOSIS — R19.7 DIARRHEA, UNSPECIFIED TYPE: ICD-10-CM

## 2024-07-11 DIAGNOSIS — J01.00 ACUTE NON-RECURRENT MAXILLARY SINUSITIS: Primary | ICD-10-CM

## 2024-07-11 PROCEDURE — 3074F SYST BP LT 130 MM HG: CPT | Performed by: STUDENT IN AN ORGANIZED HEALTH CARE EDUCATION/TRAINING PROGRAM

## 2024-07-11 PROCEDURE — 1123F ACP DISCUSS/DSCN MKR DOCD: CPT | Performed by: STUDENT IN AN ORGANIZED HEALTH CARE EDUCATION/TRAINING PROGRAM

## 2024-07-11 PROCEDURE — 3078F DIAST BP <80 MM HG: CPT | Performed by: STUDENT IN AN ORGANIZED HEALTH CARE EDUCATION/TRAINING PROGRAM

## 2024-07-11 PROCEDURE — 99214 OFFICE O/P EST MOD 30 MIN: CPT | Performed by: STUDENT IN AN ORGANIZED HEALTH CARE EDUCATION/TRAINING PROGRAM

## 2024-07-11 RX ORDER — FLUTICASONE PROPIONATE 50 MCG
1 SPRAY, SUSPENSION (ML) NASAL DAILY
Qty: 16 G | Refills: 0 | Status: SHIPPED | OUTPATIENT
Start: 2024-07-11

## 2024-07-11 RX ORDER — AMOXICILLIN AND CLAVULANATE POTASSIUM 875; 125 MG/1; MG/1
1 TABLET, FILM COATED ORAL 2 TIMES DAILY
Qty: 14 TABLET | Refills: 0 | Status: CANCELLED | OUTPATIENT
Start: 2024-07-11 | End: 2024-07-18

## 2024-07-11 NOTE — PROGRESS NOTES
[facility]  [address]  [address]  [place], [place] [address]  Tel: [phone]      2024   SUBJECTIVE/OBJECTIVE  HPI    Heide Lawrence (:  1950) is a 73 y.o. female, here for evaluation of the following medical concerns:  Chief Complaint   Patient presents with    Diarrhea   Patient is a 73 y.o. female  has a past medical history of Acid reflux, Atrial flutter (HCC), GERD (gastroesophageal reflux disease), Hyperlipidemia, and Hypertension. who presents with diarrhea and URI symptoms..  URI symptoms/diarrhea  Patient reports worsening wheezing at night, was seen by Dr Lobo a week ago and was given inhaler and Bactrim.  Reports the inhaler has not helped,  also having diarrhea for a week and sinus symptoms.  Current diet includes toast, bananas, eggs, salads, chicken, mashed potatoes and noodles.  Symptoms started a week ago.  Associated symptoms include congestion, diarrhea, ear pain, headaches, a plugged ear sensation, rhinorrhea, sinus pain, sneezing, a sore throat (congested throat , worsen laying down) and wheezing. Pertinent negatives include no abdominal pain, chest pain, coughing, dysuria, joint pain, nausea, swollen glands or vomiting. Not using any nasal sprays. Takes every allergy medications, does not tolerate Zyrtec.     Diarrhea   This is a chronic problem. Episode onset: weeks. The problem occurs 5 to 10 times per day. The problem has been unchanged. The stool consistency is described as watery, intermittent with blood.. Associated symptoms include headaches and a URI. Pertinent negatives include no abdominal pain, bloating, coughing, fever, increased  flatus, myalgias, sweats, vomiting or weight loss. Risk factors include recent antibiotic use. She has tried bismuth subsalicylate for the symptoms.   Always having urinary symptoms. Has been on Macrobid not tolerated, Kefex, and Bactrim (on currently)    Has been on medrol for gout.   History of A-fib with

## 2024-07-11 NOTE — PATIENT INSTRUCTIONS
- Stoool studies   - Blood work today   - Elevated feet and compression socks   - Loperamide / Immodium 2mg ONCE DAILY nor more 1 week  - GAstro    - Try probiotics (Lactobacillus - Probiotic )  - Use Flonase twice daily 1 week , down to 1 daily after 1 week   Increase daily water intake while using expectorant.  A humidifier or steam inhalation (as during a hot shower)  -Sleep on propped up pillows, to keep the mucus from collecting at the back of your throat

## 2024-07-12 LAB
ANION GAP SERPL CALCULATED.3IONS-SCNC: 15 MMOL/L (ref 3–16)
BUN SERPL-MCNC: 17 MG/DL (ref 7–20)
CALCIUM SERPL-MCNC: 9.7 MG/DL (ref 8.3–10.6)
CHLORIDE SERPL-SCNC: 104 MMOL/L (ref 99–110)
CO2 SERPL-SCNC: 22 MMOL/L (ref 21–32)
CREAT SERPL-MCNC: 0.9 MG/DL (ref 0.6–1.2)
GFR SERPLBLD CREATININE-BSD FMLA CKD-EPI: 66 ML/MIN/{1.73_M2}
GLUCOSE SERPL-MCNC: 114 MG/DL (ref 70–99)
MAGNESIUM SERPL-MCNC: 2.3 MG/DL (ref 1.8–2.4)
POTASSIUM SERPL-SCNC: 4.2 MMOL/L (ref 3.5–5.1)
SODIUM SERPL-SCNC: 141 MMOL/L (ref 136–145)
TSH SERPL DL<=0.005 MIU/L-ACNC: 3.68 UIU/ML (ref 0.27–4.2)

## 2024-08-05 DIAGNOSIS — K21.9 GASTROESOPHAGEAL REFLUX DISEASE WITHOUT ESOPHAGITIS: ICD-10-CM

## 2024-08-05 RX ORDER — FAMOTIDINE 40 MG/1
40 TABLET, FILM COATED ORAL NIGHTLY
Qty: 90 TABLET | Refills: 3 | Status: SHIPPED | OUTPATIENT
Start: 2024-08-05

## 2024-08-26 ASSESSMENT — ENCOUNTER SYMPTOMS
SORE THROAT: 0
DIARRHEA: 1
WHEEZING: 1
COUGH: 1
VOMITING: 0
NAUSEA: 0

## 2024-08-28 ENCOUNTER — OFFICE VISIT (OUTPATIENT)
Dept: FAMILY MEDICINE CLINIC | Age: 74
End: 2024-08-28
Payer: COMMERCIAL

## 2024-08-28 VITALS
SYSTOLIC BLOOD PRESSURE: 120 MMHG | OXYGEN SATURATION: 99 % | HEART RATE: 61 BPM | DIASTOLIC BLOOD PRESSURE: 60 MMHG | RESPIRATION RATE: 16 BRPM | BODY MASS INDEX: 29.87 KG/M2 | WEIGHT: 174 LBS | TEMPERATURE: 97.5 F

## 2024-08-28 DIAGNOSIS — R30.0 DYSURIA: Primary | ICD-10-CM

## 2024-08-28 DIAGNOSIS — R73.03 PREDIABETES: ICD-10-CM

## 2024-08-28 DIAGNOSIS — Z13.220 SCREENING FOR LIPID DISORDERS: ICD-10-CM

## 2024-08-28 DIAGNOSIS — N39.0 RECURRENT UTI: ICD-10-CM

## 2024-08-28 LAB
BILIRUBIN, POC: NORMAL
BLOOD URINE, POC: NORMAL
CLARITY, POC: NORMAL
COLOR, POC: NORMAL
GLUCOSE URINE, POC: NORMAL
KETONES, POC: NORMAL
LEUKOCYTE EST, POC: NORMAL
NITRITE, POC: NORMAL
PH, POC: 6.5
PROTEIN, POC: NORMAL
SPECIFIC GRAVITY, POC: 1.01
UROBILINOGEN, POC: 0.2

## 2024-08-28 PROCEDURE — 99214 OFFICE O/P EST MOD 30 MIN: CPT | Performed by: STUDENT IN AN ORGANIZED HEALTH CARE EDUCATION/TRAINING PROGRAM

## 2024-08-28 PROCEDURE — 1123F ACP DISCUSS/DSCN MKR DOCD: CPT | Performed by: STUDENT IN AN ORGANIZED HEALTH CARE EDUCATION/TRAINING PROGRAM

## 2024-08-28 PROCEDURE — 81002 URINALYSIS NONAUTO W/O SCOPE: CPT | Performed by: STUDENT IN AN ORGANIZED HEALTH CARE EDUCATION/TRAINING PROGRAM

## 2024-08-28 PROCEDURE — 3078F DIAST BP <80 MM HG: CPT | Performed by: STUDENT IN AN ORGANIZED HEALTH CARE EDUCATION/TRAINING PROGRAM

## 2024-08-28 PROCEDURE — 3074F SYST BP LT 130 MM HG: CPT | Performed by: STUDENT IN AN ORGANIZED HEALTH CARE EDUCATION/TRAINING PROGRAM

## 2024-08-28 RX ORDER — SULFAMETHOXAZOLE/TRIMETHOPRIM 800-160 MG
1 TABLET ORAL 2 TIMES DAILY
Qty: 14 TABLET | Refills: 0 | Status: SHIPPED | OUTPATIENT
Start: 2024-08-28 | End: 2024-09-04

## 2024-08-28 SDOH — ECONOMIC STABILITY: FOOD INSECURITY: WITHIN THE PAST 12 MONTHS, THE FOOD YOU BOUGHT JUST DIDN'T LAST AND YOU DIDN'T HAVE MONEY TO GET MORE.: NEVER TRUE

## 2024-08-28 SDOH — ECONOMIC STABILITY: FOOD INSECURITY: WITHIN THE PAST 12 MONTHS, YOU WORRIED THAT YOUR FOOD WOULD RUN OUT BEFORE YOU GOT MONEY TO BUY MORE.: NEVER TRUE

## 2024-08-28 SDOH — ECONOMIC STABILITY: INCOME INSECURITY: HOW HARD IS IT FOR YOU TO PAY FOR THE VERY BASICS LIKE FOOD, HOUSING, MEDICAL CARE, AND HEATING?: NOT VERY HARD

## 2024-08-28 ASSESSMENT — PATIENT HEALTH QUESTIONNAIRE - PHQ9
SUM OF ALL RESPONSES TO PHQ9 QUESTIONS 1 & 2: 0
1. LITTLE INTEREST OR PLEASURE IN DOING THINGS: NOT AT ALL
SUM OF ALL RESPONSES TO PHQ QUESTIONS 1-9: 0
SUM OF ALL RESPONSES TO PHQ QUESTIONS 1-9: 0
2. FEELING DOWN, DEPRESSED OR HOPELESS: NOT AT ALL
SUM OF ALL RESPONSES TO PHQ QUESTIONS 1-9: 0
SUM OF ALL RESPONSES TO PHQ QUESTIONS 1-9: 0

## 2024-08-28 NOTE — PROGRESS NOTES
[facility]  [address]  [address]  [place], [place] [address]  Tel: [phone]      2024   SUBJECTIVE/OBJECTIVE  HPI    Heide Lawrence (:  1950) is a 73 y.o. female, here for evaluation of the following medical concerns:  Chief Complaint   Patient presents with    Urinary Frequency     Patient is a 73 y.o. female  has a past medical history of Acid reflux, Atrial flutter (HCC), GERD (gastroesophageal reflux disease), Hyperlipidemia, and Hypertension. who presents with diarrhea and UTI symptoms..     Dysuria   This is a new problem. The current episode started 1 to 4 weeks ago. The problem occurs intermittently. The problem has been gradually worsening. The quality of the pain is described as burning. The pain is at a severity of 5/10. The pain is moderate. There has been no fever. She is Not sexually active. There is No history of pyelonephritis. Associated symptoms include flank pain (left), hesitancy, sweats (hot flahses) and urgency. Pertinent negatives include no chills, discharge, frequency, hematuria, nausea or vomiting. Associated symptoms comments: Cloudy . She has tried nothing for the symptoms. Her past medical history is significant for recurrent UTIs. There is no history of kidney stones.   History of chronic UTIs on Bactrim daily. Has appointment 2024 for UroGyn.   4 infections in the last years.  Has been on Bactrim , Keflex  . Macrobid caused her to feeel ill.     Has been on medrol for gout.   History of A-fib with RVRTaking  diltiazem, atenolol and amiodarone.    Review of Systems   Constitutional:  Negative for chills.   Gastrointestinal:  Negative for nausea and vomiting.   Genitourinary:  Positive for flank pain (left), hesitancy and urgency. Negative for frequency and hematuria.                  Physical exam  /60 (Site: Left Upper Arm, Position: Sitting)   Pulse 61   Temp 97.5 °F (36.4 °C) (Temporal)   Resp 16   Wt 78.9 kg (174 lb)

## 2024-08-28 NOTE — PATIENT INSTRUCTIONS
UTI, uncomplicated  -Urine culture pending, will call with results.    -Start empiric. Nitrofurantoin 100 mg bid X 5 days. TMP SMX BID for 3 days  -Consider cranberry juice.  -Reviewed the risks of the antibiotics and need for compliance. ,  AZO Dual Protection™ Urinary and Vaginal Support    NOT PYRIDIUM   -Call sooner if worse, medication reaction suspected or any new or worse symptoms including fever, blood in urine, GI symptoms, flank pain or constitutional symptoms

## 2024-08-29 LAB
CHOLEST SERPL-MCNC: 163 MG/DL (ref 0–199)
EST. AVERAGE GLUCOSE BLD GHB EST-MCNC: 99.7 MG/DL
HBA1C MFR BLD: 5.1 %
HDLC SERPL-MCNC: 47 MG/DL (ref 40–60)
LDL CHOLESTEROL: 91 MG/DL
TRIGL SERPL-MCNC: 123 MG/DL (ref 0–150)
VLDLC SERPL CALC-MCNC: 25 MG/DL

## 2024-08-31 LAB
BACTERIA UR CULT: ABNORMAL
ORGANISM: ABNORMAL

## 2024-09-03 ENCOUNTER — TELEPHONE (OUTPATIENT)
Dept: FAMILY MEDICINE CLINIC | Age: 74
End: 2024-09-03

## 2024-09-03 NOTE — TELEPHONE ENCOUNTER
----- Message from Dr. Heide Rogers MD sent at 8/31/2024  1:57 PM EDT -----  9/3- wellness visit, overdue.

## 2024-09-25 SDOH — HEALTH STABILITY: PHYSICAL HEALTH: ON AVERAGE, HOW MANY MINUTES DO YOU ENGAGE IN EXERCISE AT THIS LEVEL?: 20 MIN

## 2024-09-25 SDOH — HEALTH STABILITY: PHYSICAL HEALTH: ON AVERAGE, HOW MANY DAYS PER WEEK DO YOU ENGAGE IN MODERATE TO STRENUOUS EXERCISE (LIKE A BRISK WALK)?: 2 DAYS

## 2024-09-25 ASSESSMENT — PATIENT HEALTH QUESTIONNAIRE - PHQ9
SUM OF ALL RESPONSES TO PHQ QUESTIONS 1-9: 0
2. FEELING DOWN, DEPRESSED OR HOPELESS: NOT AT ALL
SUM OF ALL RESPONSES TO PHQ9 QUESTIONS 1 & 2: 0
SUM OF ALL RESPONSES TO PHQ QUESTIONS 1-9: 0
1. LITTLE INTEREST OR PLEASURE IN DOING THINGS: NOT AT ALL

## 2024-09-25 ASSESSMENT — LIFESTYLE VARIABLES
HOW OFTEN DO YOU HAVE A DRINK CONTAINING ALCOHOL: NEVER
HOW OFTEN DO YOU HAVE A DRINK CONTAINING ALCOHOL: 1
HOW OFTEN DO YOU HAVE SIX OR MORE DRINKS ON ONE OCCASION: 1
HOW MANY STANDARD DRINKS CONTAINING ALCOHOL DO YOU HAVE ON A TYPICAL DAY: 0
HOW MANY STANDARD DRINKS CONTAINING ALCOHOL DO YOU HAVE ON A TYPICAL DAY: PATIENT DOES NOT DRINK

## 2024-09-26 ENCOUNTER — OFFICE VISIT (OUTPATIENT)
Dept: FAMILY MEDICINE CLINIC | Age: 74
End: 2024-09-26

## 2024-09-26 VITALS
TEMPERATURE: 97 F | SYSTOLIC BLOOD PRESSURE: 128 MMHG | WEIGHT: 179.4 LBS | DIASTOLIC BLOOD PRESSURE: 72 MMHG | RESPIRATION RATE: 16 BRPM | HEART RATE: 73 BPM | BODY MASS INDEX: 29.89 KG/M2 | OXYGEN SATURATION: 98 % | HEIGHT: 65 IN

## 2024-09-26 DIAGNOSIS — I48.20 CHRONIC ATRIAL FIBRILLATION (HCC): ICD-10-CM

## 2024-09-26 DIAGNOSIS — Z00.00 MEDICARE ANNUAL WELLNESS VISIT, SUBSEQUENT: ICD-10-CM

## 2024-09-26 DIAGNOSIS — R73.01 IMPAIRED FASTING BLOOD SUGAR: ICD-10-CM

## 2024-09-26 DIAGNOSIS — Z23 NEED FOR SHINGLES VACCINE: ICD-10-CM

## 2024-09-26 DIAGNOSIS — E78.00 PURE HYPERCHOLESTEROLEMIA: ICD-10-CM

## 2024-09-26 DIAGNOSIS — I10 PRIMARY HYPERTENSION: ICD-10-CM

## 2024-09-26 DIAGNOSIS — Z00.00 ROUTINE GENERAL MEDICAL EXAMINATION AT A HEALTH CARE FACILITY: Primary | ICD-10-CM

## 2024-09-26 RX ORDER — AMLODIPINE BESYLATE 5 MG/1
5 TABLET ORAL DAILY
COMMUNITY
Start: 2024-09-17

## 2024-09-28 RX ORDER — ZOSTER VACCINE RECOMBINANT, ADJUVANTED 50 MCG/0.5
0.5 KIT INTRAMUSCULAR SEE ADMIN INSTRUCTIONS
Qty: 0.5 ML | Refills: 0 | Status: SHIPPED | OUTPATIENT
Start: 2024-09-28 | End: 2025-03-27

## 2024-10-07 ENCOUNTER — OFFICE VISIT (OUTPATIENT)
Dept: FAMILY MEDICINE CLINIC | Age: 74
End: 2024-10-07

## 2024-10-07 VITALS
DIASTOLIC BLOOD PRESSURE: 80 MMHG | OXYGEN SATURATION: 99 % | HEART RATE: 66 BPM | TEMPERATURE: 98.2 F | BODY MASS INDEX: 30.19 KG/M2 | RESPIRATION RATE: 16 BRPM | SYSTOLIC BLOOD PRESSURE: 128 MMHG | WEIGHT: 181.2 LBS | HEIGHT: 65 IN

## 2024-10-07 DIAGNOSIS — E78.00 PURE HYPERCHOLESTEROLEMIA: ICD-10-CM

## 2024-10-07 DIAGNOSIS — R73.01 IMPAIRED FASTING BLOOD SUGAR: Primary | ICD-10-CM

## 2024-10-07 DIAGNOSIS — I10 PRIMARY HYPERTENSION: ICD-10-CM

## 2024-10-07 DIAGNOSIS — E79.0 HYPERURICEMIA: ICD-10-CM

## 2024-10-07 DIAGNOSIS — N39.0 RECURRENT UTI: ICD-10-CM

## 2024-10-07 DIAGNOSIS — K21.9 GASTROESOPHAGEAL REFLUX DISEASE WITHOUT ESOPHAGITIS: ICD-10-CM

## 2024-10-07 RX ORDER — ESTRADIOL 0.1 MG/G
CREAM VAGINAL
COMMUNITY
Start: 2024-09-19

## 2024-10-07 RX ORDER — AMOXICILLIN 875 MG
875 TABLET ORAL 2 TIMES DAILY
COMMUNITY
Start: 2024-10-01 | End: 2024-10-08

## 2024-10-07 RX ORDER — METHENAMINE HIPPURATE 1000 MG/1
1 TABLET ORAL 2 TIMES DAILY
COMMUNITY
Start: 2024-10-01

## 2024-10-07 RX ORDER — CLOBETASOL PROPIONATE 0.5 MG/G
OINTMENT TOPICAL DAILY
COMMUNITY
Start: 2024-09-19

## 2024-10-07 ASSESSMENT — PATIENT HEALTH QUESTIONNAIRE - PHQ9
SUM OF ALL RESPONSES TO PHQ QUESTIONS 1-9: 0
SUM OF ALL RESPONSES TO PHQ QUESTIONS 1-9: 0
2. FEELING DOWN, DEPRESSED OR HOPELESS: NOT AT ALL
SUM OF ALL RESPONSES TO PHQ QUESTIONS 1-9: 0
SUM OF ALL RESPONSES TO PHQ QUESTIONS 1-9: 0
SUM OF ALL RESPONSES TO PHQ9 QUESTIONS 1 & 2: 0
1. LITTLE INTEREST OR PLEASURE IN DOING THINGS: NOT AT ALL

## 2024-10-16 NOTE — PROGRESS NOTES
[facility]  DIVISION OF OTOLARYNGOLOGY- HEAD & NECK SURGERY  CONSULT    Patient Name: Heide Lawrence  Medical Record Number:  [id number]  Primary Care Physician:  Heide Sadler MD  Date of Consultation: 10/17/2024    Chief Complaint:   Chief Complaint   Patient presents with    New Patient    Ear Problem     Ringing in left ear x 1 year       HISTORY OF PRESENT ILLNESS  Heide is a(n) 74 y.o. female who presents evaluation of ringing in her left ear.  She states that she had an ablation in July and it worsened at that point in time but was present before then.  She states that the ringing is very similar to a whooshing sound and sometimes correlates with her heart.  She does not note any significant change in her hearing.  She does note that when she is stressed or anxious the sound is louder.  Patient Active Problem List   Diagnosis    Hypertension    Hyperlipidemia    Impaired fasting blood sugar    Gastroesophageal reflux disease without esophagitis    Positive FIT (fecal immunochemical test)    Thyromegaly    Elevated lactic acid level    Multiple thyroid nodules    High serum thyroid stimulating hormone (TSH)    Subclinical hypothyroidism     Past Surgical History:   Procedure Laterality Date    CARDIAC ELECTROPHYSIOLOGY MAPPING AND ABLATION  07/2024    CHOLECYSTECTOMY  09/2001    HYSTERECTOMY, TOTAL ABDOMINAL (CERVIX REMOVED)  1999    OVARY REMOVAL      TONSILLECTOMY  1964    TUBAL LIGATION  1972     Family History   Problem Relation Age of Onset    Diabetes Mother     Heart Disease Mother     High Blood Pressure Mother     High Cholesterol Mother     Vision Loss Mother     Cancer Father     Heart Attack Sister     Heart Failure Brother     Heart Attack Maternal Grandmother     Asthma Maternal Grandmother      Social History     Socioeconomic History    Marital status:      Spouse name: Not on file    Number of children: Not on file    Years of education: Not on file    Highest education level: Not on

## 2024-10-17 ENCOUNTER — OFFICE VISIT (OUTPATIENT)
Dept: ENT CLINIC | Age: 74
End: 2024-10-17
Payer: COMMERCIAL

## 2024-10-17 ENCOUNTER — PROCEDURE VISIT (OUTPATIENT)
Dept: AUDIOLOGY | Age: 74
End: 2024-10-17
Payer: COMMERCIAL

## 2024-10-17 VITALS
DIASTOLIC BLOOD PRESSURE: 89 MMHG | BODY MASS INDEX: 29.99 KG/M2 | HEIGHT: 65 IN | SYSTOLIC BLOOD PRESSURE: 157 MMHG | HEART RATE: 69 BPM | WEIGHT: 180 LBS

## 2024-10-17 DIAGNOSIS — H90.3 ASYMMETRIC SNHL (SENSORINEURAL HEARING LOSS): Primary | ICD-10-CM

## 2024-10-17 DIAGNOSIS — H93.12 TINNITUS, LEFT EAR: ICD-10-CM

## 2024-10-17 DIAGNOSIS — H93.12 TINNITUS OF LEFT EAR: ICD-10-CM

## 2024-10-17 DIAGNOSIS — H90.3 SENSORINEURAL HEARING LOSS, BILATERAL: Primary | ICD-10-CM

## 2024-10-17 PROCEDURE — 3077F SYST BP >= 140 MM HG: CPT | Performed by: STUDENT IN AN ORGANIZED HEALTH CARE EDUCATION/TRAINING PROGRAM

## 2024-10-17 PROCEDURE — 92567 TYMPANOMETRY: CPT | Performed by: AUDIOLOGIST

## 2024-10-17 PROCEDURE — 92557 COMPREHENSIVE HEARING TEST: CPT | Performed by: AUDIOLOGIST

## 2024-10-17 PROCEDURE — 99204 OFFICE O/P NEW MOD 45 MIN: CPT | Performed by: STUDENT IN AN ORGANIZED HEALTH CARE EDUCATION/TRAINING PROGRAM

## 2024-10-17 PROCEDURE — 1123F ACP DISCUSS/DSCN MKR DOCD: CPT | Performed by: STUDENT IN AN ORGANIZED HEALTH CARE EDUCATION/TRAINING PROGRAM

## 2024-10-17 PROCEDURE — 3079F DIAST BP 80-89 MM HG: CPT | Performed by: STUDENT IN AN ORGANIZED HEALTH CARE EDUCATION/TRAINING PROGRAM

## 2024-10-17 ASSESSMENT — ENCOUNTER SYMPTOMS
RHINORRHEA: 0
VOMITING: 0
EYE PAIN: 0
NAUSEA: 0
SHORTNESS OF BREATH: 0
COUGH: 0

## 2024-10-17 NOTE — PROGRESS NOTES
Heide Lawrence   1950, 74 y.o. female   [id number]       Referring Provider: Spencer Willard DO  Referral Type: In an order in Epic    Reason for Visit: Evaluation of the cause of disorders of hearing, tinnitus, or balance.    ADULT AUDIOLOGIC EVALUATION      Heide Lawrence is a 74 y.o. female seen today, 10/17/2024 , for an initial audiologic evaluation.  Patient was seen by Spencer Willard DO following today's evaluation. Patient was accompanied by family member.    AUDIOLOGIC AND OTHER PERTINENT MEDICAL HISTORY:      Heide Lawrence noted tinnitus and family history of hearing loss.  Patient reports constant left ear pulsatile tinnitus worsening after cardiac ablation surgery.  She noted a family history of hearing loss, mother, occurring later in life.    Heide Lawrence denied otalgia, aural fullness, dizziness, history of occupational/recreational noise exposure, history of head trauma, and history of ear surgery.    Date: 10/17/2024     IMPRESSIONS:      Normal middle ear pressure and compliance, bilaterally.  Abnormal asymmetric hearing loss, left worse than right, which can affect every day listening needs.  Word understanding was good in the left ear and excellent in the right ear presented at elevated sensation levels, bilaterally.  Hearing aids are recommended once medical clearance is given.  Follow medical recommendations of Spencer Willard DO.     ASSESSMENT AND FINDINGS:     Otoscopy revealed: Clear ear canals bilaterally    RIGHT EAR:  Hearing Sensitivity: Normal hearing sensitivity to a moderately severe sensorineural hearing loss from 250-8000 Hz  Speech Recognition Threshold: 20 dB HL  Word Recognition:Excellent (100%), based on NU-6 25-word list at 65 dBHL with 35 dBHL of masking using recorded speech stimuli.    Tympanometry: Normal peak pressure and compliance, Type A tympanogram, consistent with normal middle ear function.      LEFT EAR:  Hearing Sensitivity: Normal hearing sensitivity to a moderately severe

## 2025-01-20 DIAGNOSIS — E78.00 PURE HYPERCHOLESTEROLEMIA: ICD-10-CM

## 2025-01-21 RX ORDER — LOVASTATIN 40 MG/1
TABLET ORAL
Qty: 90 TABLET | Refills: 2 | Status: SHIPPED | OUTPATIENT
Start: 2025-01-21

## 2025-01-21 NOTE — TELEPHONE ENCOUNTER
LOV 10/7/2024    Future Appointments   Date Time Provider Department Center   4/18/2025 10:30 AM Daphney Huffman, [facility]   4/18/2025 11:00 AM Spencer Willard DO [facility]     Please fill in the absence of Dr. Rogers

## 2025-01-29 DIAGNOSIS — R21 SKIN RASH: ICD-10-CM

## 2025-01-29 RX ORDER — CLOTRIMAZOLE AND BETAMETHASONE DIPROPIONATE 10; .64 MG/G; MG/G
CREAM TOPICAL
Qty: 45 G | Refills: 0 | Status: SHIPPED | OUTPATIENT
Start: 2025-01-29

## 2025-01-29 NOTE — TELEPHONE ENCOUNTER
Lov 10/7/2024    Future Appointments   Date Time Provider Department Center   4/18/2025 10:30 AM Daphney Huffman [facility]   4/18/2025 11:00 AM Spencer Willard DO [facility]

## 2025-02-25 ENCOUNTER — TELEPHONE (OUTPATIENT)
Dept: FAMILY MEDICINE CLINIC | Age: 75
End: 2025-02-25

## 2025-03-26 ENCOUNTER — TELEPHONE (OUTPATIENT)
Dept: FAMILY MEDICINE CLINIC | Age: 75
End: 2025-03-26

## 2025-03-26 NOTE — TELEPHONE ENCOUNTER
Pt called. She has and appt on 4/14 and is asking if her son can come with her to have his FMLA paperwork filled out.     Please call Heide back and advise.

## 2025-04-14 ENCOUNTER — OFFICE VISIT (OUTPATIENT)
Dept: FAMILY MEDICINE CLINIC | Age: 75
End: 2025-04-14
Payer: COMMERCIAL

## 2025-04-14 VITALS
TEMPERATURE: 97.3 F | DIASTOLIC BLOOD PRESSURE: 64 MMHG | SYSTOLIC BLOOD PRESSURE: 122 MMHG | WEIGHT: 180 LBS | RESPIRATION RATE: 14 BRPM | HEART RATE: 76 BPM | BODY MASS INDEX: 29.99 KG/M2 | HEIGHT: 65 IN

## 2025-04-14 DIAGNOSIS — E78.00 PURE HYPERCHOLESTEROLEMIA: ICD-10-CM

## 2025-04-14 DIAGNOSIS — I48.20 CHRONIC ATRIAL FIBRILLATION (HCC): ICD-10-CM

## 2025-04-14 DIAGNOSIS — K21.9 GASTROESOPHAGEAL REFLUX DISEASE WITHOUT ESOPHAGITIS: ICD-10-CM

## 2025-04-14 DIAGNOSIS — I10 PRIMARY HYPERTENSION: ICD-10-CM

## 2025-04-14 DIAGNOSIS — E79.0 HYPERURICEMIA: ICD-10-CM

## 2025-04-14 DIAGNOSIS — Z53.20 MAMMOGRAM DECLINED: ICD-10-CM

## 2025-04-14 DIAGNOSIS — R73.01 IMPAIRED FASTING BLOOD SUGAR: ICD-10-CM

## 2025-04-14 DIAGNOSIS — Z53.20 COLON CANCER SCREENING DECLINED: ICD-10-CM

## 2025-04-14 DIAGNOSIS — Z00.00 ROUTINE GENERAL MEDICAL EXAMINATION AT A HEALTH CARE FACILITY: ICD-10-CM

## 2025-04-14 DIAGNOSIS — Z00.00 MEDICARE ANNUAL WELLNESS VISIT, SUBSEQUENT: Primary | ICD-10-CM

## 2025-04-14 DIAGNOSIS — Z79.01 CHRONIC ANTICOAGULATION: ICD-10-CM

## 2025-04-14 PROCEDURE — 1159F MED LIST DOCD IN RCRD: CPT | Performed by: INTERNAL MEDICINE

## 2025-04-14 PROCEDURE — 3078F DIAST BP <80 MM HG: CPT | Performed by: INTERNAL MEDICINE

## 2025-04-14 PROCEDURE — 3074F SYST BP LT 130 MM HG: CPT | Performed by: INTERNAL MEDICINE

## 2025-04-14 PROCEDURE — 1123F ACP DISCUSS/DSCN MKR DOCD: CPT | Performed by: INTERNAL MEDICINE

## 2025-04-14 PROCEDURE — G0439 PPPS, SUBSEQ VISIT: HCPCS | Performed by: INTERNAL MEDICINE

## 2025-04-14 ASSESSMENT — PATIENT HEALTH QUESTIONNAIRE - PHQ9
SUM OF ALL RESPONSES TO PHQ QUESTIONS 1-9: 0
SUM OF ALL RESPONSES TO PHQ QUESTIONS 1-9: 0
1. LITTLE INTEREST OR PLEASURE IN DOING THINGS: NOT AT ALL
SUM OF ALL RESPONSES TO PHQ QUESTIONS 1-9: 0
2. FEELING DOWN, DEPRESSED OR HOPELESS: NOT AT ALL
SUM OF ALL RESPONSES TO PHQ QUESTIONS 1-9: 0

## 2025-04-14 ASSESSMENT — LIFESTYLE VARIABLES
HOW OFTEN DO YOU HAVE A DRINK CONTAINING ALCOHOL: NEVER
HOW MANY STANDARD DRINKS CONTAINING ALCOHOL DO YOU HAVE ON A TYPICAL DAY: PATIENT DOES NOT DRINK

## 2025-04-14 NOTE — PATIENT INSTRUCTIONS
benefits. Some of the tests and screenings are paid in full while other may be subject to a deductible, co-insurance, and/or copay.  Some of these benefits include a comprehensive review of your medical history including lifestyle, illnesses that may run in your family, and various assessments and screenings as appropriate.  After reviewing your medical record and screening and assessments performed today your provider may have ordered immunizations, labs, imaging, and/or referrals for you.  A list of these orders (if applicable) as well as your Preventive Care list are included within your After Visit Summary for your review.

## 2025-04-15 ENCOUNTER — RESULTS FOLLOW-UP (OUTPATIENT)
Dept: FAMILY MEDICINE CLINIC | Age: 75
End: 2025-04-15

## 2025-04-15 DIAGNOSIS — Z12.31 ENCOUNTER FOR SCREENING MAMMOGRAM FOR MALIGNANT NEOPLASM OF BREAST: Primary | ICD-10-CM

## 2025-04-15 LAB
ALBUMIN SERPL-MCNC: 4.4 G/DL (ref 3.4–5)
ALBUMIN/GLOB SERPL: 2.3 {RATIO} (ref 1.1–2.2)
ALP SERPL-CCNC: 68 U/L (ref 40–129)
ALT SERPL-CCNC: 22 U/L (ref 10–40)
ANION GAP SERPL CALCULATED.3IONS-SCNC: 11 MMOL/L (ref 3–16)
AST SERPL-CCNC: 20 U/L (ref 15–37)
BILIRUB SERPL-MCNC: 0.5 MG/DL (ref 0–1)
BUN SERPL-MCNC: 16 MG/DL (ref 7–20)
CALCIUM SERPL-MCNC: 9.6 MG/DL (ref 8.3–10.6)
CHLORIDE SERPL-SCNC: 100 MMOL/L (ref 99–110)
CHOLEST SERPL-MCNC: 188 MG/DL (ref 0–199)
CO2 SERPL-SCNC: 28 MMOL/L (ref 21–32)
CREAT SERPL-MCNC: 0.8 MG/DL (ref 0.6–1.2)
EST. AVERAGE GLUCOSE BLD GHB EST-MCNC: 108.3 MG/DL
GFR SERPLBLD CREATININE-BSD FMLA CKD-EPI: 76 ML/MIN/{1.73_M2}
GLUCOSE SERPL-MCNC: 102 MG/DL (ref 70–99)
HBA1C MFR BLD: 5.4 %
HDLC SERPL-MCNC: 53 MG/DL (ref 40–60)
LDLC SERPL CALC-MCNC: 104 MG/DL
POTASSIUM SERPL-SCNC: 4 MMOL/L (ref 3.5–5.1)
PROT SERPL-MCNC: 6.3 G/DL (ref 6.4–8.2)
SODIUM SERPL-SCNC: 139 MMOL/L (ref 136–145)
TRIGL SERPL-MCNC: 156 MG/DL (ref 0–150)
VLDLC SERPL CALC-MCNC: 31 MG/DL

## 2025-04-15 NOTE — PROGRESS NOTES
Medicare Annual Wellness Visit    Heide Lawrence is here for Medicare AWV    Assessment & Plan   Medicare annual wellness visit, subsequent  Chronic atrial fibrillation (HCC)  Hyperuricemia  Impaired fasting blood sugar  -     Comprehensive Metabolic Panel; Future  -     Hemoglobin A1C; Future  Routine general medical examination at a health care facility  Pure hypercholesterolemia  -     Lipid Panel; Future  Primary hypertension  Mammogram declined  Colon cancer screening declined  Chronic anticoagulation   Gerd  Hyperuricemia     Return in 25 weeks (on 10/6/2025).       Patient's complete Health Risk Assessment and screening values have been reviewed and are found in Flowsheets. The following problems were reviewed today and where indicated follow up appointments were made and/or referrals ordered.    Positive Risk Factor Screenings with Interventions:     Cognitive:   Clock Drawing Test (CDT): (!) Abnormal  Words recalled: 3 Words Recalled     Total Score Interpretation: Normal Mini-Cog    Interventions:  none            Inactivity:  On average, how many days per week do you engage in moderate to strenuous exercise (like a brisk walk)?: 0 days (!) Abnormal  On average, how many minutes do you engage in exercise at this level?: 0 min    Interventions:  Advised increased activit      Dentist Screen:  Have you seen the dentist within the past year?: (!) No    Intervention:  Advised to schedule with their dentist    Hearing Screen:  Do you or your family notice any trouble with your hearing that hasn't been managed with hearing aids?: (!) Yes (HEARING LOSS IN LEFT EAR)    Interventions:  Patient declines any further evaluation or treatment    Vision Screen:  Do you have difficulty driving, watching TV, or doing any of your daily activities because of your eyesight?: No  Have you had an eye exam within the past year?: (!) No    Interventions:   Patient encouraged to make appointment with their eye specialist

## 2025-05-20 ENCOUNTER — TELEPHONE (OUTPATIENT)
Dept: FAMILY MEDICINE CLINIC | Age: 75
End: 2025-05-20

## 2025-05-20 DIAGNOSIS — I10 ESSENTIAL HYPERTENSION: ICD-10-CM

## 2025-05-20 DIAGNOSIS — R21 SKIN RASH: ICD-10-CM

## 2025-05-20 RX ORDER — LISINOPRIL AND HYDROCHLOROTHIAZIDE 12.5; 2 MG/1; MG/1
TABLET ORAL
Qty: 180 TABLET | Refills: 3 | Status: SHIPPED | OUTPATIENT
Start: 2025-05-20

## 2025-06-03 RX ORDER — CLOTRIMAZOLE AND BETAMETHASONE DIPROPIONATE 10; .64 MG/G; MG/G
CREAM TOPICAL
Qty: 45 G | Refills: 1 | Status: SHIPPED | OUTPATIENT
Start: 2025-06-03

## 2025-06-04 DIAGNOSIS — E79.0 HYPERURICEMIA: ICD-10-CM

## 2025-06-04 RX ORDER — ALLOPURINOL 100 MG/1
100 TABLET ORAL DAILY
Qty: 90 TABLET | Refills: 0 | Status: SHIPPED | OUTPATIENT
Start: 2025-06-04

## 2025-06-17 DIAGNOSIS — I10 ESSENTIAL (PRIMARY) HYPERTENSION: ICD-10-CM

## 2025-06-17 RX ORDER — AMLODIPINE BESYLATE 5 MG/1
5 TABLET ORAL DAILY
Qty: 90 TABLET | Refills: 3 | Status: SHIPPED | OUTPATIENT
Start: 2025-06-17

## 2025-07-30 DIAGNOSIS — K21.9 GASTROESOPHAGEAL REFLUX DISEASE WITHOUT ESOPHAGITIS: ICD-10-CM

## 2025-07-30 RX ORDER — FAMOTIDINE 40 MG/1
40 TABLET, FILM COATED ORAL NIGHTLY
Qty: 90 TABLET | Refills: 2 | Status: SHIPPED | OUTPATIENT
Start: 2025-07-30

## 2025-07-30 NOTE — TELEPHONE ENCOUNTER
Patient needs a refill on Pepcid. They need a 90 day supply.     Mail order or local pharmacy: local    Ph[facility]acy: [facility]    Last OV: 4/14/2025    No future appointments.

## 2025-07-31 RX ORDER — FAMOTIDINE 40 MG/1
40 TABLET, FILM COATED ORAL NIGHTLY
Qty: 90 TABLET | Refills: 3 | OUTPATIENT
Start: 2025-07-31